# Patient Record
Sex: MALE | Race: WHITE | NOT HISPANIC OR LATINO | Employment: FULL TIME | URBAN - METROPOLITAN AREA
[De-identification: names, ages, dates, MRNs, and addresses within clinical notes are randomized per-mention and may not be internally consistent; named-entity substitution may affect disease eponyms.]

---

## 2017-05-10 DIAGNOSIS — I25.10 CAD (CORONARY ARTERY DISEASE): ICD-10-CM

## 2017-05-10 RX ORDER — ROSUVASTATIN CALCIUM 40 MG/1
40 TABLET, COATED ORAL DAILY
Qty: 90 TABLET | Refills: 0 | Status: SHIPPED | OUTPATIENT
Start: 2017-05-10 | End: 2017-12-14

## 2017-06-16 DIAGNOSIS — Z95.820 S/P ANGIOPLASTY WITH STENT: ICD-10-CM

## 2017-06-16 DIAGNOSIS — I25.10 CAD (CORONARY ARTERY DISEASE): ICD-10-CM

## 2017-06-16 RX ORDER — LISINOPRIL 10 MG/1
10 TABLET ORAL DAILY
Qty: 90 TABLET | Refills: 1 | Status: SHIPPED | OUTPATIENT
Start: 2017-06-16 | End: 2017-12-26

## 2017-06-16 NOTE — TELEPHONE ENCOUNTER
Lisinopril 10 mg tab     Take one tab by mouth daily    [facility] [address]

## 2017-10-31 DIAGNOSIS — I25.10 CAD (CORONARY ARTERY DISEASE): ICD-10-CM

## 2017-10-31 DIAGNOSIS — Z95.820 S/P ANGIOPLASTY WITH STENT: ICD-10-CM

## 2017-11-01 RX ORDER — METOPROLOL TARTRATE 25 MG/1
12.5 TABLET, FILM COATED ORAL 2 TIMES DAILY
Qty: 90 TABLET | Refills: 0 | Status: SHIPPED | OUTPATIENT
Start: 2017-11-01 | End: 2018-02-15

## 2017-12-14 DIAGNOSIS — I25.10 CAD (CORONARY ARTERY DISEASE): ICD-10-CM

## 2017-12-15 RX ORDER — ROSUVASTATIN CALCIUM 40 MG/1
40 TABLET, COATED ORAL DAILY
Qty: 90 TABLET | Refills: 0 | Status: SHIPPED | OUTPATIENT
Start: 2017-12-15 | End: 2018-02-07

## 2017-12-15 NOTE — TELEPHONE ENCOUNTER
Travon was called, and reminded that he needs to schedule a follow up appointment in the cardiology clinic to be able to get more refills.  Refill for 3 months was given, and the telephone number for the scheduling line in the cards clinic.

## 2017-12-26 DIAGNOSIS — Z95.820 S/P ANGIOPLASTY WITH STENT: ICD-10-CM

## 2017-12-26 DIAGNOSIS — I25.10 CAD (CORONARY ARTERY DISEASE): ICD-10-CM

## 2017-12-28 RX ORDER — LISINOPRIL 10 MG/1
10 TABLET ORAL DAILY
Qty: 90 TABLET | Refills: 1 | Status: SHIPPED | OUTPATIENT
Start: 2017-12-28 | End: 2018-02-15

## 2018-02-07 ENCOUNTER — MYC REFILL (OUTPATIENT)
Dept: CARDIOLOGY | Facility: CLINIC | Age: 43
End: 2018-02-07

## 2018-02-07 DIAGNOSIS — I25.10 CAD (CORONARY ARTERY DISEASE): ICD-10-CM

## 2018-02-07 RX ORDER — ROSUVASTATIN CALCIUM 40 MG/1
40 TABLET, COATED ORAL DAILY
Qty: 30 TABLET | Refills: 0 | Status: SHIPPED | OUTPATIENT
Start: 2018-02-07 | End: 2018-02-15

## 2018-02-15 ENCOUNTER — OFFICE VISIT (OUTPATIENT)
Dept: FAMILY MEDICINE | Facility: CLINIC | Age: 43
End: 2018-02-15
Payer: COMMERCIAL

## 2018-02-15 VITALS
HEIGHT: 75 IN | BODY MASS INDEX: 30.46 KG/M2 | WEIGHT: 245 LBS | DIASTOLIC BLOOD PRESSURE: 72 MMHG | SYSTOLIC BLOOD PRESSURE: 118 MMHG | HEART RATE: 68 BPM | TEMPERATURE: 98.3 F

## 2018-02-15 DIAGNOSIS — Z95.820 S/P ANGIOPLASTY WITH STENT: ICD-10-CM

## 2018-02-15 DIAGNOSIS — Z00.00 ROUTINE GENERAL MEDICAL EXAMINATION AT A HEALTH CARE FACILITY: Primary | ICD-10-CM

## 2018-02-15 DIAGNOSIS — I25.10 CORONARY ARTERY DISEASE WITHOUT ANGINA PECTORIS, UNSPECIFIED VESSEL OR LESION TYPE, UNSPECIFIED WHETHER NATIVE OR TRANSPLANTED HEART: ICD-10-CM

## 2018-02-15 PROCEDURE — 36415 COLL VENOUS BLD VENIPUNCTURE: CPT | Performed by: PHYSICIAN ASSISTANT

## 2018-02-15 PROCEDURE — 80061 LIPID PANEL: CPT | Performed by: PHYSICIAN ASSISTANT

## 2018-02-15 PROCEDURE — 99396 PREV VISIT EST AGE 40-64: CPT | Performed by: PHYSICIAN ASSISTANT

## 2018-02-15 PROCEDURE — 80048 BASIC METABOLIC PNL TOTAL CA: CPT | Performed by: PHYSICIAN ASSISTANT

## 2018-02-15 RX ORDER — NITROGLYCERIN 0.4 MG/1
0.4 TABLET SUBLINGUAL EVERY 5 MIN PRN
Qty: 25 TABLET | Refills: 3 | Status: SHIPPED | OUTPATIENT
Start: 2018-02-15 | End: 2019-05-07

## 2018-02-15 RX ORDER — ROSUVASTATIN CALCIUM 40 MG/1
40 TABLET, COATED ORAL DAILY
Qty: 90 TABLET | Refills: 3 | Status: SHIPPED | OUTPATIENT
Start: 2018-02-15 | End: 2019-04-10

## 2018-02-15 RX ORDER — METOPROLOL TARTRATE 25 MG/1
12.5 TABLET, FILM COATED ORAL 2 TIMES DAILY
Qty: 135 TABLET | Refills: 3 | Status: SHIPPED | OUTPATIENT
Start: 2018-02-15 | End: 2019-05-07

## 2018-02-15 RX ORDER — LISINOPRIL 10 MG/1
10 TABLET ORAL DAILY
Qty: 90 TABLET | Refills: 3 | Status: SHIPPED | OUTPATIENT
Start: 2018-02-15 | End: 2019-02-17

## 2018-02-15 NOTE — MR AVS SNAPSHOT
After Visit Summary   2/15/2018    Travon Livingston    MRN: [id number]           Patient Information     Date Of Birth          1975        Visit Information        Provider Department      2/15/2018 12:40 PM Huseyin Noland PA-C [facility]        Today's Diagnoses     Routine general medical examination at a health care facility    -  1    S/P angioplasty with stent        Coronary artery disease without angina pectoris, unspecified vessel or lesion type, unspecified whether native or transplanted heart           Follow-ups after your visit        Your next 10 appointments already scheduled     Feb 21, 2018  7:45 AM CST   (Arrive by 7:30 AM)   Return Visit with MIRI Reyna [facility] ([facility])    [address] [place] [address]  [address] [place] [id number] [phone]              Who to contact     If you have questions or need follow up information about today's clinic visit or your schedule please contact [facility] directly at [phone].  Normal or non-critical lab and imaging results will be communicated to you by op5hart, letter or phone within 4 business days after the clinic has received the results. If you do not hear from us within 7 days, please contact the clinic through [facility] or phone. If you have a critical or abnormal lab result, we will notify you by phone as soon as possible.  Submit refill requests through [facility] or call your pharmacy and they will forward the refill request to us. Please allow 3 business days for your refill to be completed.          Additional Information About Your Visit        op5hart Information     [facility] gives you secure access to your electronic health record. If you see a primary care provider, you can also send messages to your care team and make appointments. If you have questions, please call your primary care clinic.  If you do not have a primary care  "provider, please call [phone] and they will assist you.        Care EveryWhere ID     This is your Care EveryWhere ID. This could be used by other organizations to access your [place] medical records  [id number]        Your Vitals Were     Pulse Temperature Height BMI (Body Mass Index)          68 98.3  F (36.8  C) (Oral) 6' 3.25\" (1.911 m) 30.42 kg/m2         Blood Pressure from Last 3 Encounters:   02/15/18 118/72   07/20/15 116/68   02/13/15 108/71    Weight from Last 3 Encounters:   02/15/18 245 lb (111.1 kg)   07/20/15 261 lb 6 oz (118.6 kg)   02/13/15 258 lb 4.8 oz (117.2 kg)              We Performed the Following     Basic metabolic panel  (Ca, Cl, CO2, Creat, Gluc, K, Na, BUN)     Lipid panel reflex to direct LDL Fasting          Today's Medication Changes          These changes are accurate as of 2/15/18  1:09 PM.  If you have any questions, ask your nurse or doctor.               These medicines have changed or have updated prescriptions.        Dose/Directions    lisinopril 10 MG tablet   Commonly known as:  PRINIVIL/ZESTRIL   This may have changed:  additional instructions   Used for:  Coronary artery disease without angina pectoris, unspecified vessel or lesion type, unspecified whether native or transplanted heart, S/P angioplasty with stent   Changed by:  Huseyin Noland PA-C        Dose:  10 mg   Take 1 tablet (10 mg) by mouth daily   Quantity:  90 tablet   Refills:  3       metoprolol tartrate 25 MG tablet   Commonly known as:  LOPRESSOR   This may have changed:  additional instructions   Used for:  S/P angioplasty with stent, Coronary artery disease without angina pectoris, unspecified vessel or lesion type, unspecified whether native or transplanted heart   Changed by:  Huseyin Noland PA-C        Dose:  12.5 mg   Take 0.5 tablets (12.5 mg) by mouth 2 times daily   Quantity:  135 tablet   Refills:  3            Where to get your medicines      These medications were sent to [facility] [id number] [facility] [place] - [address]  [address], [place] [address]     Phone:  [phone]     lisinopril 10 MG tablet    metoprolol tartrate 25 MG tablet    nitroGLYcerin 0.4 MG sublingual tablet    rosuvastatin 40 MG tablet                Primary Care Provider Office Phone # Fax #    Huseyin Diallo Noland PA-C [phone]       [address] [place] [address]        Equal Access to Services     [facility]: Hadii aad ku hadasho Soomaali, waaxda luqadaha, qaybta kaalmada adeegyada, waxay idiin hayaan adeeg kharash beatriz . So [facility] [phone].    ATENCIÓN: Si habla español, tiene a krishnamurthy disposición servicios gratuitos de asistencia lingüística. [facility] [phone].    We comply with applicable federal civil rights laws and [place] laws. We do not discriminate on the basis of race, color, national origin, age, disability, sex, sexual orientation, or gender identity.            Thank you!     Thank you for choosing [facility]  for your care. Our goal is always to provide you with excellent care. Hearing back from our patients is one way we can continue to improve our services. Please take a few minutes to complete the written survey that you may receive in the mail after your visit with us. Thank you!             Your Updated Medication List - Protect others around you: Learn how to safely use, store and throw away your medicines at www.disposemymeds.org.          This list is accurate as of 2/15/18  1:09 PM.  Always use your most recent med list.                   Brand Name Dispense Instructions for use Diagnosis    aspirin 81 MG EC tablet      Take 1 tablet by mouth daily.    CAD (coronary artery disease)       lisinopril 10 MG tablet    PRINIVIL/ZESTRIL    90 tablet    Take 1 tablet (10 mg) by mouth daily    Coronary artery disease without angina pectoris, unspecified vessel or lesion type, unspecified whether native or transplanted heart, S/P  angioplasty with stent       metoprolol tartrate 25 MG tablet    LOPRESSOR    135 tablet    Take 0.5 tablets (12.5 mg) by mouth 2 times daily    S/P angioplasty with stent, Coronary artery disease without angina pectoris, unspecified vessel or lesion type, unspecified whether native or transplanted heart       nitroGLYcerin 0.4 MG sublingual tablet    NITROSTAT    25 tablet    Place 1 tablet (0.4 mg) under the tongue every 5 minutes as needed for chest pain Can repeat x3, then call EMS    Coronary artery disease without angina pectoris, unspecified vessel or lesion type, unspecified whether native or transplanted heart, S/P angioplasty with stent       rosuvastatin 40 MG tablet    CRESTOR    90 tablet    Take 1 tablet (40 mg) by mouth daily    S/P angioplasty with stent, Coronary artery disease without angina pectoris, unspecified vessel or lesion type, unspecified whether native or transplanted heart

## 2018-02-15 NOTE — PROGRESS NOTES
SUBJECTIVE:   CC: Travon Livingston is an 42 year old male who presents for preventative health visit.     Physical   Annual:     Getting at least 3 servings of Calcium per day::  Yes    Bi-annual eye exam::  Yes    Dental care twice a year::  NO    Sleep apnea or symptoms of sleep apnea::  Excessive snoring    Diet::  Low fat/cholesterol    Frequency of exercise::  2-3 days/week    Duration of exercise::  30-45 minutes    Taking medications regularly::  No    Barriers to taking medications::  Problems remembering to take them    Additional concerns today::  No                Today's PHQ-2 Score:   PHQ-2 ( 1999 [employer]) 2/14/2018   Q1: Little interest or pleasure in doing things 0   Q2: Feeling down, depressed or hopeless 1   PHQ-2 Score 1   Q1: Little interest or pleasure in doing things Not at all   Q2: Feeling down, depressed or hopeless Several days   PHQ-2 Score 1       Abuse: Current or Past(Physical, Sexual or Emotional)- No  Do you feel safe in your environment - Yes    Social History   Substance Use Topics     Smoking status: Former Smoker     Packs/day: 1.00     Years: 15.00     Types: Cigarettes     Quit date: 8/23/2011     Smokeless tobacco: Never Used     Alcohol use Yes      Comment: daily, 1 to 2 beers      Alcohol Use 2/14/2018   If you drink alcohol, do you typically have greater than 3 drinks per day OR greater than 7 drinks per week?   No   No flowsheet data found.    Last PSA: No results found for: PSA    Reviewed orders with patient. Reviewed health maintenance and updated orders accordingly - Yes  Labs reviewed in EPIC    Reviewed and updated as needed this visit by clinical staff         Reviewed and updated as needed this visit by Provider            Review of Systems  C: NEGATIVE for fever, chills, change in weight  I: NEGATIVE for worrisome rashes, moles or lesions  E: NEGATIVE for vision changes or irritation  ENT: NEGATIVE for ear, mouth and throat problems  R: NEGATIVE for significant cough  or SOB  CV: NEGATIVE for chest pain, palpitations or peripheral edema  GI: NEGATIVE for nausea, abdominal pain, heartburn, or change in bowel habits   male: negative for dysuria, hematuria, decreased urinary stream, erectile dysfunction, urethral discharge  M: NEGATIVE for significant arthralgias or myalgia  N: NEGATIVE for weakness, dizziness or paresthesias  P: NEGATIVE for changes in mood or affect    OBJECTIVE:   There were no vitals taken for this visit.    Physical Exam  GENERAL: healthy, alert and no distress  EYES: Eyes grossly normal to inspection, PERRL and conjunctivae and sclerae normal  HENT: ear canals and TM's normal, nose and mouth without ulcers or lesions  NECK: no adenopathy, no asymmetry, masses, or scars and thyroid normal to palpation  RESP: lungs clear to auscultation - no rales, rhonchi or wheezes  CV: regular rate and rhythm, normal S1 S2, no S3 or S4, no murmur, click or rub, no peripheral edema and peripheral pulses strong  ABDOMEN: soft, nontender, no hepatosplenomegaly, no masses and bowel sounds normal  MS: no gross musculoskeletal defects noted, no edema  SKIN: no suspicious lesions or rashes  NEURO: Normal strength and tone, mentation intact and speech normal  PSYCH: mentation appears normal, affect normal/bright  LYMPH: no cervical, supraclavicular, axillary, or inguinal adenopathy    ASSESSMENT/PLAN:   (Z00.00) Routine general medical examination at a health care facility  (primary encounter diagnosis)  Comment: Well person   Plan: Basic metabolic panel  (Ca, Cl, CO2, Creat,         Gluc, K, Na, BUN), Lipid panel reflex to direct        LDL Fasting        Diet, exercise, wellness and other preventive recommendations related to health maintenance were discussed.  Follow up as needed for acute issues.  Physical exam in 1 year.     (Z95.9) S/P angioplasty with stent  Comment:   Plan: rosuvastatin (CRESTOR) 40 MG tablet, metoprolol        tartrate (LOPRESSOR) 25 MG tablet, lisinopril  "        (PRINIVIL/ZESTRIL) 10 MG tablet, nitroGLYcerin         (NITROSTAT) 0.4 MG sublingual tablet        Asymptomatic. No concerns. Continue meds.    (I25.10) Coronary artery disease without angina pectoris, unspecified vessel or lesion type, unspecified whether native or transplanted heart  Comment:   Plan: rosuvastatin (CRESTOR) 40 MG tablet, metoprolol        tartrate (LOPRESSOR) 25 MG tablet, lisinopril         (PRINIVIL/ZESTRIL) 10 MG tablet, nitroGLYcerin         (NITROSTAT) 0.4 MG sublingual tablet        Asymptomatic. No concerns. Continue meds, exercise, general diet/lifestyle modifications      COUNSELING:   Reviewed preventive health counseling, as reflected in patient instructions         reports that he quit smoking about 6 years ago. His smoking use included Cigarettes. He has a 15.00 pack-year smoking history. He has never used smokeless tobacco.    Estimated body mass index is 32.24 kg/(m^2) as calculated from the following:    Height as of 7/20/15: 6' 3.5\" (1.918 m).    Weight as of 7/20/15: 261 lb 6 oz (118.6 kg).       Counseling Resources:  ATP IV Guidelines  Pooled Cohorts Equation Calculator  FRAX Risk Assessment  ICSI Preventive Guidelines  Dietary Guidelines for Americans, 2010  USDA's MyPlate  ASA Prophylaxis  Lung CA Screening    ONOFRE MORALES PA-C  [facility]  Answers for HPI/ROS submitted by the patient on 2/14/2018   PHQ-2 Score: 1    "

## 2018-02-16 LAB
ANION GAP SERPL CALCULATED.3IONS-SCNC: 9 MMOL/L (ref 3–14)
BUN SERPL-MCNC: 12 MG/DL (ref 7–30)
CALCIUM SERPL-MCNC: 9.4 MG/DL (ref 8.5–10.1)
CHLORIDE SERPL-SCNC: 105 MMOL/L (ref 94–109)
CHOLEST SERPL-MCNC: 211 MG/DL
CO2 SERPL-SCNC: 24 MMOL/L (ref 20–32)
CREAT SERPL-MCNC: 0.85 MG/DL (ref 0.66–1.25)
GFR SERPL CREATININE-BSD FRML MDRD: >90 ML/MIN/1.7M2
GLUCOSE SERPL-MCNC: 86 MG/DL (ref 70–99)
HDLC SERPL-MCNC: 51 MG/DL
LDLC SERPL CALC-MCNC: 134 MG/DL
NONHDLC SERPL-MCNC: 160 MG/DL
POTASSIUM SERPL-SCNC: 4.4 MMOL/L (ref 3.4–5.3)
SODIUM SERPL-SCNC: 138 MMOL/L (ref 133–144)
TRIGL SERPL-MCNC: 130 MG/DL

## 2018-07-16 ENCOUNTER — APPOINTMENT (OUTPATIENT)
Dept: CARDIOLOGY | Facility: CLINIC | Age: 43
End: 2018-07-16
Attending: PHYSICIAN ASSISTANT
Payer: COMMERCIAL

## 2018-07-16 ENCOUNTER — APPOINTMENT (OUTPATIENT)
Dept: GENERAL RADIOLOGY | Facility: CLINIC | Age: 43
End: 2018-07-16
Attending: EMERGENCY MEDICINE
Payer: COMMERCIAL

## 2018-07-16 ENCOUNTER — HOSPITAL ENCOUNTER (OUTPATIENT)
Facility: CLINIC | Age: 43
Setting detail: OBSERVATION
Discharge: HOME | End: 2018-07-17
Attending: EMERGENCY MEDICINE | Admitting: INTERNAL MEDICINE
Payer: COMMERCIAL

## 2018-07-16 DIAGNOSIS — I25.10 CAD S/P PERCUTANEOUS CORONARY ANGIOPLASTY: ICD-10-CM

## 2018-07-16 DIAGNOSIS — R07.9 CHEST PAIN: ICD-10-CM

## 2018-07-16 DIAGNOSIS — Z95.820 S/P ANGIOPLASTY WITH STENT: Primary | ICD-10-CM

## 2018-07-16 DIAGNOSIS — G47.33 OSA (OBSTRUCTIVE SLEEP APNEA): ICD-10-CM

## 2018-07-16 DIAGNOSIS — Z98.61 CAD S/P PERCUTANEOUS CORONARY ANGIOPLASTY: ICD-10-CM

## 2018-07-16 LAB
ALBUMIN SERPL-MCNC: 4.7 G/DL (ref 3.4–5)
ALP SERPL-CCNC: 86 U/L (ref 40–150)
ALT SERPL W P-5'-P-CCNC: 36 U/L (ref 0–70)
ANION GAP SERPL CALCULATED.3IONS-SCNC: 7 MMOL/L (ref 3–14)
AST SERPL W P-5'-P-CCNC: 19 U/L (ref 0–45)
BASOPHILS # BLD AUTO: 0 10E9/L (ref 0–0.2)
BASOPHILS NFR BLD AUTO: 0.6 %
BILIRUB SERPL-MCNC: 0.6 MG/DL (ref 0.2–1.3)
BUN SERPL-MCNC: 10 MG/DL (ref 7–30)
CALCIUM SERPL-MCNC: 8.9 MG/DL (ref 8.5–10.1)
CHLORIDE SERPL-SCNC: 105 MMOL/L (ref 94–109)
CO2 SERPL-SCNC: 26 MMOL/L (ref 20–32)
CREAT SERPL-MCNC: 0.87 MG/DL (ref 0.66–1.25)
DIFFERENTIAL METHOD BLD: NORMAL
EOSINOPHIL # BLD AUTO: 0.1 10E9/L (ref 0–0.7)
EOSINOPHIL NFR BLD AUTO: 1.6 %
ERYTHROCYTE [DISTWIDTH] IN BLOOD BY AUTOMATED COUNT: 12.3 % (ref 10–15)
GFR SERPL CREATININE-BSD FRML MDRD: >90 ML/MIN/1.7M2
GLUCOSE SERPL-MCNC: 96 MG/DL (ref 70–99)
HCT VFR BLD AUTO: 46.6 % (ref 40–53)
HGB BLD-MCNC: 16.2 G/DL (ref 13.3–17.7)
IMM GRANULOCYTES # BLD: 0 10E9/L (ref 0–0.4)
IMM GRANULOCYTES NFR BLD: 0.1 %
INTERPRETATION ECG - MUSE: NORMAL
KCT BLD-ACNC: 205 SEC (ref 75–150)
LYMPHOCYTES # BLD AUTO: 2 10E9/L (ref 0.8–5.3)
LYMPHOCYTES NFR BLD AUTO: 29 %
MCH RBC QN AUTO: 31.8 PG (ref 26.5–33)
MCHC RBC AUTO-ENTMCNC: 34.8 G/DL (ref 31.5–36.5)
MCV RBC AUTO: 92 FL (ref 78–100)
MONOCYTES # BLD AUTO: 0.5 10E9/L (ref 0–1.3)
MONOCYTES NFR BLD AUTO: 6.6 %
NEUTROPHILS # BLD AUTO: 4.3 10E9/L (ref 1.6–8.3)
NEUTROPHILS NFR BLD AUTO: 62.1 %
NRBC # BLD AUTO: 0 10*3/UL
NRBC BLD AUTO-RTO: 0 /100
PLATELET # BLD AUTO: 261 10E9/L (ref 150–450)
POTASSIUM SERPL-SCNC: 4.1 MMOL/L (ref 3.4–5.3)
PROT SERPL-MCNC: 8 G/DL (ref 6.8–8.8)
RBC # BLD AUTO: 5.09 10E12/L (ref 4.4–5.9)
SODIUM SERPL-SCNC: 138 MMOL/L (ref 133–144)
TROPONIN I BLD-MCNC: 0 UG/L (ref 0–0.1)
TROPONIN I SERPL-MCNC: <0.015 UG/L (ref 0–0.04)
TROPONIN I SERPL-MCNC: <0.015 UG/L (ref 0–0.04)
WBC # BLD AUTO: 7 10E9/L (ref 4–11)

## 2018-07-16 PROCEDURE — 80061 LIPID PANEL: CPT | Performed by: PHYSICIAN ASSISTANT

## 2018-07-16 PROCEDURE — 25000132 ZZH RX MED GY IP 250 OP 250 PS 637: Performed by: INTERNAL MEDICINE

## 2018-07-16 PROCEDURE — C1894 INTRO/SHEATH, NON-LASER: HCPCS

## 2018-07-16 PROCEDURE — 27210742 ZZH CATH CR1

## 2018-07-16 PROCEDURE — 93454 CORONARY ARTERY ANGIO S&I: CPT

## 2018-07-16 PROCEDURE — C1760 CLOSURE DEV, VASC: HCPCS

## 2018-07-16 PROCEDURE — 85025 COMPLETE CBC W/AUTO DIFF WBC: CPT | Performed by: EMERGENCY MEDICINE

## 2018-07-16 PROCEDURE — C1874 STENT, COATED/COV W/DEL SYS: HCPCS

## 2018-07-16 PROCEDURE — C1887 CATHETER, GUIDING: HCPCS

## 2018-07-16 PROCEDURE — 71045 X-RAY EXAM CHEST 1 VIEW: CPT

## 2018-07-16 PROCEDURE — 93005 ELECTROCARDIOGRAM TRACING: CPT | Mod: 59 | Performed by: EMERGENCY MEDICINE

## 2018-07-16 PROCEDURE — 99284 EMERGENCY DEPT VISIT MOD MDM: CPT | Mod: 25 | Performed by: EMERGENCY MEDICINE

## 2018-07-16 PROCEDURE — C1725 CATH, TRANSLUMIN NON-LASER: HCPCS

## 2018-07-16 PROCEDURE — C9600 PERC DRUG-EL COR STENT SING: HCPCS

## 2018-07-16 PROCEDURE — 99152 MOD SED SAME PHYS/QHP 5/>YRS: CPT | Performed by: INTERNAL MEDICINE

## 2018-07-16 PROCEDURE — 27210787 ZZH MANIFOLD CR2

## 2018-07-16 PROCEDURE — 25000128 H RX IP 250 OP 636: Performed by: EMERGENCY MEDICINE

## 2018-07-16 PROCEDURE — 25000132 ZZH RX MED GY IP 250 OP 250 PS 637: Performed by: EMERGENCY MEDICINE

## 2018-07-16 PROCEDURE — 96374 THER/PROPH/DIAG INJ IV PUSH: CPT | Performed by: EMERGENCY MEDICINE

## 2018-07-16 PROCEDURE — 80053 COMPREHEN METABOLIC PANEL: CPT | Performed by: EMERGENCY MEDICINE

## 2018-07-16 PROCEDURE — 99204 OFFICE O/P NEW MOD 45 MIN: CPT | Mod: 25 | Performed by: PHYSICIAN ASSISTANT

## 2018-07-16 PROCEDURE — 25000128 H RX IP 250 OP 636: Performed by: STUDENT IN AN ORGANIZED HEALTH CARE EDUCATION/TRAINING PROGRAM

## 2018-07-16 PROCEDURE — 85347 COAGULATION TIME ACTIVATED: CPT

## 2018-07-16 PROCEDURE — 92928 PRQ TCAT PLMT NTRAC ST 1 LES: CPT | Mod: RC | Performed by: INTERNAL MEDICINE

## 2018-07-16 PROCEDURE — 93005 ELECTROCARDIOGRAM TRACING: CPT

## 2018-07-16 PROCEDURE — 25000125 ZZHC RX 250: Performed by: HOSPITALIST

## 2018-07-16 PROCEDURE — G0378 HOSPITAL OBSERVATION PER HR: HCPCS

## 2018-07-16 PROCEDURE — 27211089 ZZH KIT ACIST INJECTOR CR3

## 2018-07-16 PROCEDURE — 40000065 ZZH STATISTIC EKG NON-CHARGEABLE

## 2018-07-16 PROCEDURE — 93010 ELECTROCARDIOGRAM REPORT: CPT | Performed by: INTERNAL MEDICINE

## 2018-07-16 PROCEDURE — 36415 COLL VENOUS BLD VENIPUNCTURE: CPT | Performed by: PHYSICIAN ASSISTANT

## 2018-07-16 PROCEDURE — 93010 ELECTROCARDIOGRAM REPORT: CPT | Mod: Z6 | Performed by: EMERGENCY MEDICINE

## 2018-07-16 PROCEDURE — 99219 ZZC INITIAL OBSERVATION CARE,LEVL II: CPT | Mod: 25 | Performed by: INTERNAL MEDICINE

## 2018-07-16 PROCEDURE — 99153 MOD SED SAME PHYS/QHP EA: CPT

## 2018-07-16 PROCEDURE — 27210998 ZZH ACCESS HEART CATH CR6

## 2018-07-16 PROCEDURE — C1769 GUIDE WIRE: HCPCS

## 2018-07-16 PROCEDURE — 84484 ASSAY OF TROPONIN QUANT: CPT | Mod: 91

## 2018-07-16 PROCEDURE — 99285 EMERGENCY DEPT VISIT HI MDM: CPT | Mod: 25 | Performed by: EMERGENCY MEDICINE

## 2018-07-16 PROCEDURE — 99152 MOD SED SAME PHYS/QHP 5/>YRS: CPT

## 2018-07-16 PROCEDURE — 84484 ASSAY OF TROPONIN QUANT: CPT | Mod: 91 | Performed by: PHYSICIAN ASSISTANT

## 2018-07-16 PROCEDURE — 93454 CORONARY ARTERY ANGIO S&I: CPT | Mod: 26 | Performed by: INTERNAL MEDICINE

## 2018-07-16 PROCEDURE — 84484 ASSAY OF TROPONIN QUANT: CPT | Performed by: EMERGENCY MEDICINE

## 2018-07-16 PROCEDURE — 27210760 ZZH DEVICE INFLATION CR7

## 2018-07-16 PROCEDURE — 27210946 ZZH KIT HC TOTES DISP CR8

## 2018-07-16 PROCEDURE — 25000128 H RX IP 250 OP 636: Performed by: HOSPITALIST

## 2018-07-16 RX ORDER — ONDANSETRON 2 MG/ML
4 INJECTION INTRAMUSCULAR; INTRAVENOUS EVERY 6 HOURS PRN
Status: DISCONTINUED | OUTPATIENT
Start: 2018-07-16 | End: 2018-07-16

## 2018-07-16 RX ORDER — ARGATROBAN 1 MG/ML
350 INJECTION, SOLUTION INTRAVENOUS
Status: DISCONTINUED | OUTPATIENT
Start: 2018-07-16 | End: 2018-07-16 | Stop reason: HOSPADM

## 2018-07-16 RX ORDER — FLUMAZENIL 0.1 MG/ML
0.2 INJECTION, SOLUTION INTRAVENOUS
Status: ACTIVE | OUTPATIENT
Start: 2018-07-16 | End: 2018-07-17

## 2018-07-16 RX ORDER — ONDANSETRON 2 MG/ML
4 INJECTION INTRAMUSCULAR; INTRAVENOUS EVERY 6 HOURS PRN
Status: DISCONTINUED | OUTPATIENT
Start: 2018-07-16 | End: 2018-07-17 | Stop reason: HOSPADM

## 2018-07-16 RX ORDER — PROTAMINE SULFATE 10 MG/ML
25-100 INJECTION, SOLUTION INTRAVENOUS EVERY 5 MIN PRN
Status: DISCONTINUED | OUTPATIENT
Start: 2018-07-16 | End: 2018-07-16 | Stop reason: HOSPADM

## 2018-07-16 RX ORDER — DEXTROSE MONOHYDRATE 25 G/50ML
12.5-5 INJECTION, SOLUTION INTRAVENOUS EVERY 30 MIN PRN
Status: DISCONTINUED | OUTPATIENT
Start: 2018-07-16 | End: 2018-07-16 | Stop reason: HOSPADM

## 2018-07-16 RX ORDER — NITROGLYCERIN 0.4 MG/1
0.4 TABLET SUBLINGUAL EVERY 5 MIN PRN
Status: DISCONTINUED | OUTPATIENT
Start: 2018-07-16 | End: 2018-07-16

## 2018-07-16 RX ORDER — ASPIRIN 81 MG/1
324 TABLET, CHEWABLE ORAL ONCE
Status: COMPLETED | OUTPATIENT
Start: 2018-07-16 | End: 2018-07-16

## 2018-07-16 RX ORDER — ROSUVASTATIN CALCIUM 40 MG/1
40 TABLET, COATED ORAL DAILY
Status: DISCONTINUED | OUTPATIENT
Start: 2018-07-17 | End: 2018-07-17 | Stop reason: HOSPADM

## 2018-07-16 RX ORDER — NITROGLYCERIN 5 MG/ML
100-200 VIAL (ML) INTRAVENOUS
Status: DISCONTINUED | OUTPATIENT
Start: 2018-07-16 | End: 2018-07-16 | Stop reason: HOSPADM

## 2018-07-16 RX ORDER — ATROPINE SULFATE 0.1 MG/ML
0.5 INJECTION INTRAVENOUS EVERY 5 MIN PRN
Status: ACTIVE | OUTPATIENT
Start: 2018-07-16 | End: 2018-07-17

## 2018-07-16 RX ORDER — DIPHENHYDRAMINE HYDROCHLORIDE 50 MG/ML
25-50 INJECTION INTRAMUSCULAR; INTRAVENOUS
Status: DISCONTINUED | OUTPATIENT
Start: 2018-07-16 | End: 2018-07-16 | Stop reason: HOSPADM

## 2018-07-16 RX ORDER — IOPAMIDOL 755 MG/ML
60 INJECTION, SOLUTION INTRAVASCULAR ONCE
Status: COMPLETED | OUTPATIENT
Start: 2018-07-16 | End: 2018-07-16

## 2018-07-16 RX ORDER — CLOPIDOGREL BISULFATE 75 MG/1
300-600 TABLET ORAL
Status: DISCONTINUED | OUTPATIENT
Start: 2018-07-16 | End: 2018-07-16 | Stop reason: HOSPADM

## 2018-07-16 RX ORDER — POTASSIUM CHLORIDE 7.45 MG/ML
10 INJECTION INTRAVENOUS
Status: DISCONTINUED | OUTPATIENT
Start: 2018-07-16 | End: 2018-07-16 | Stop reason: HOSPADM

## 2018-07-16 RX ORDER — NICARDIPINE HYDROCHLORIDE 2.5 MG/ML
100 INJECTION INTRAVENOUS
Status: DISCONTINUED | OUTPATIENT
Start: 2018-07-16 | End: 2018-07-16 | Stop reason: HOSPADM

## 2018-07-16 RX ORDER — ALUMINA, MAGNESIA, AND SIMETHICONE 2400; 2400; 240 MG/30ML; MG/30ML; MG/30ML
30 SUSPENSION ORAL EVERY 4 HOURS PRN
Status: DISCONTINUED | OUTPATIENT
Start: 2018-07-16 | End: 2018-07-17 | Stop reason: HOSPADM

## 2018-07-16 RX ORDER — ACETAMINOPHEN 325 MG/1
650 TABLET ORAL EVERY 4 HOURS PRN
Status: DISCONTINUED | OUTPATIENT
Start: 2018-07-16 | End: 2018-07-17 | Stop reason: HOSPADM

## 2018-07-16 RX ORDER — HYDRALAZINE HYDROCHLORIDE 20 MG/ML
10 INJECTION INTRAMUSCULAR; INTRAVENOUS
Status: DISCONTINUED | OUTPATIENT
Start: 2018-07-16 | End: 2018-07-17 | Stop reason: HOSPADM

## 2018-07-16 RX ORDER — NALOXONE HYDROCHLORIDE 0.4 MG/ML
.2-.4 INJECTION, SOLUTION INTRAMUSCULAR; INTRAVENOUS; SUBCUTANEOUS
Status: ACTIVE | OUTPATIENT
Start: 2018-07-16 | End: 2018-07-17

## 2018-07-16 RX ORDER — NALOXONE HYDROCHLORIDE 0.4 MG/ML
.1-.4 INJECTION, SOLUTION INTRAMUSCULAR; INTRAVENOUS; SUBCUTANEOUS
Status: DISCONTINUED | OUTPATIENT
Start: 2018-07-16 | End: 2018-07-17 | Stop reason: HOSPADM

## 2018-07-16 RX ORDER — NALOXONE HYDROCHLORIDE 0.4 MG/ML
0.4 INJECTION, SOLUTION INTRAMUSCULAR; INTRAVENOUS; SUBCUTANEOUS EVERY 5 MIN PRN
Status: DISCONTINUED | OUTPATIENT
Start: 2018-07-16 | End: 2018-07-16 | Stop reason: HOSPADM

## 2018-07-16 RX ORDER — SODIUM CHLORIDE 9 MG/ML
INJECTION, SOLUTION INTRAVENOUS CONTINUOUS
Status: ACTIVE | OUTPATIENT
Start: 2018-07-16 | End: 2018-07-16

## 2018-07-16 RX ORDER — METOPROLOL TARTRATE 25 MG/1
25 TABLET, FILM COATED ORAL 2 TIMES DAILY
Status: DISCONTINUED | OUTPATIENT
Start: 2018-07-16 | End: 2018-07-16

## 2018-07-16 RX ORDER — PROTAMINE SULFATE 10 MG/ML
1-5 INJECTION, SOLUTION INTRAVENOUS
Status: DISCONTINUED | OUTPATIENT
Start: 2018-07-16 | End: 2018-07-16 | Stop reason: HOSPADM

## 2018-07-16 RX ORDER — ONDANSETRON 2 MG/ML
4 INJECTION INTRAMUSCULAR; INTRAVENOUS EVERY 4 HOURS PRN
Status: DISCONTINUED | OUTPATIENT
Start: 2018-07-16 | End: 2018-07-16 | Stop reason: HOSPADM

## 2018-07-16 RX ORDER — FUROSEMIDE 10 MG/ML
20-100 INJECTION INTRAMUSCULAR; INTRAVENOUS
Status: DISCONTINUED | OUTPATIENT
Start: 2018-07-16 | End: 2018-07-16 | Stop reason: HOSPADM

## 2018-07-16 RX ORDER — ARGATROBAN 1 MG/ML
150 INJECTION, SOLUTION INTRAVENOUS
Status: DISCONTINUED | OUTPATIENT
Start: 2018-07-16 | End: 2018-07-16 | Stop reason: HOSPADM

## 2018-07-16 RX ORDER — LORAZEPAM 2 MG/ML
.5-2 INJECTION INTRAMUSCULAR EVERY 4 HOURS PRN
Status: DISCONTINUED | OUTPATIENT
Start: 2018-07-16 | End: 2018-07-16 | Stop reason: HOSPADM

## 2018-07-16 RX ORDER — ONDANSETRON 4 MG/1
4 TABLET, ORALLY DISINTEGRATING ORAL EVERY 6 HOURS PRN
Status: DISCONTINUED | OUTPATIENT
Start: 2018-07-16 | End: 2018-07-17 | Stop reason: HOSPADM

## 2018-07-16 RX ORDER — PRASUGREL 10 MG/1
10-60 TABLET, FILM COATED ORAL
Status: DISCONTINUED | OUTPATIENT
Start: 2018-07-16 | End: 2018-07-16 | Stop reason: HOSPADM

## 2018-07-16 RX ORDER — FLUMAZENIL 0.1 MG/ML
0.2 INJECTION, SOLUTION INTRAVENOUS
Status: DISCONTINUED | OUTPATIENT
Start: 2018-07-16 | End: 2018-07-16 | Stop reason: HOSPADM

## 2018-07-16 RX ORDER — ASPIRIN 81 MG/1
81-324 TABLET, CHEWABLE ORAL
Status: DISCONTINUED | OUTPATIENT
Start: 2018-07-16 | End: 2018-07-16 | Stop reason: HOSPADM

## 2018-07-16 RX ORDER — ONDANSETRON 2 MG/ML
4 INJECTION INTRAMUSCULAR; INTRAVENOUS EVERY 30 MIN PRN
Status: DISCONTINUED | OUTPATIENT
Start: 2018-07-16 | End: 2018-07-16

## 2018-07-16 RX ORDER — NITROGLYCERIN 0.4 MG/1
0.4 TABLET SUBLINGUAL EVERY 5 MIN PRN
Status: DISCONTINUED | OUTPATIENT
Start: 2018-07-16 | End: 2018-07-16 | Stop reason: HOSPADM

## 2018-07-16 RX ORDER — LIDOCAINE HYDROCHLORIDE 10 MG/ML
30 INJECTION, SOLUTION EPIDURAL; INFILTRATION; INTRACAUDAL; PERINEURAL
Status: DISCONTINUED | OUTPATIENT
Start: 2018-07-16 | End: 2018-07-16 | Stop reason: HOSPADM

## 2018-07-16 RX ORDER — HYDRALAZINE HYDROCHLORIDE 20 MG/ML
10-20 INJECTION INTRAMUSCULAR; INTRAVENOUS
Status: DISCONTINUED | OUTPATIENT
Start: 2018-07-16 | End: 2018-07-16 | Stop reason: HOSPADM

## 2018-07-16 RX ORDER — ACETAMINOPHEN 650 MG/1
650 SUPPOSITORY RECTAL EVERY 4 HOURS PRN
Status: DISCONTINUED | OUTPATIENT
Start: 2018-07-16 | End: 2018-07-17 | Stop reason: HOSPADM

## 2018-07-16 RX ORDER — METHYLPREDNISOLONE SODIUM SUCCINATE 125 MG/2ML
125 INJECTION, POWDER, LYOPHILIZED, FOR SOLUTION INTRAMUSCULAR; INTRAVENOUS
Status: DISCONTINUED | OUTPATIENT
Start: 2018-07-16 | End: 2018-07-16 | Stop reason: HOSPADM

## 2018-07-16 RX ORDER — NITROGLYCERIN 5 MG/ML
100-500 VIAL (ML) INTRAVENOUS
Status: DISCONTINUED | OUTPATIENT
Start: 2018-07-16 | End: 2018-07-16 | Stop reason: HOSPADM

## 2018-07-16 RX ORDER — POTASSIUM CHLORIDE 29.8 MG/ML
20 INJECTION INTRAVENOUS
Status: DISCONTINUED | OUTPATIENT
Start: 2018-07-16 | End: 2018-07-16 | Stop reason: HOSPADM

## 2018-07-16 RX ORDER — HEPARIN SODIUM 1000 [USP'U]/ML
1000-10000 INJECTION, SOLUTION INTRAVENOUS; SUBCUTANEOUS EVERY 5 MIN PRN
Status: DISCONTINUED | OUTPATIENT
Start: 2018-07-16 | End: 2018-07-16 | Stop reason: HOSPADM

## 2018-07-16 RX ORDER — CLOPIDOGREL BISULFATE 75 MG/1
75 TABLET ORAL
Status: DISCONTINUED | OUTPATIENT
Start: 2018-07-16 | End: 2018-07-16 | Stop reason: HOSPADM

## 2018-07-16 RX ORDER — NITROGLYCERIN 20 MG/100ML
.07-1.74 INJECTION INTRAVENOUS CONTINUOUS PRN
Status: DISCONTINUED | OUTPATIENT
Start: 2018-07-16 | End: 2018-07-16 | Stop reason: HOSPADM

## 2018-07-16 RX ORDER — ONDANSETRON 4 MG/1
4 TABLET, ORALLY DISINTEGRATING ORAL EVERY 6 HOURS PRN
Status: DISCONTINUED | OUTPATIENT
Start: 2018-07-16 | End: 2018-07-16

## 2018-07-16 RX ORDER — EPTIFIBATIDE 2 MG/ML
180 INJECTION, SOLUTION INTRAVENOUS EVERY 10 MIN PRN
Status: DISCONTINUED | OUTPATIENT
Start: 2018-07-16 | End: 2018-07-16 | Stop reason: HOSPADM

## 2018-07-16 RX ORDER — ACETAMINOPHEN 325 MG/1
650 TABLET ORAL EVERY 4 HOURS PRN
Status: DISCONTINUED | OUTPATIENT
Start: 2018-07-16 | End: 2018-07-16

## 2018-07-16 RX ORDER — LISINOPRIL 10 MG/1
10 TABLET ORAL DAILY
Status: DISCONTINUED | OUTPATIENT
Start: 2018-07-17 | End: 2018-07-17 | Stop reason: HOSPADM

## 2018-07-16 RX ORDER — PHENYLEPHRINE HCL IN 0.9% NACL 1 MG/10 ML
20-100 SYRINGE (ML) INTRAVENOUS
Status: DISCONTINUED | OUTPATIENT
Start: 2018-07-16 | End: 2018-07-16 | Stop reason: HOSPADM

## 2018-07-16 RX ORDER — ASPIRIN 81 MG/1
81 TABLET ORAL DAILY
Status: DISCONTINUED | OUTPATIENT
Start: 2018-07-17 | End: 2018-07-17 | Stop reason: HOSPADM

## 2018-07-16 RX ORDER — ADENOSINE 3 MG/ML
12-12000 INJECTION, SOLUTION INTRAVENOUS
Status: DISCONTINUED | OUTPATIENT
Start: 2018-07-16 | End: 2018-07-16 | Stop reason: HOSPADM

## 2018-07-16 RX ORDER — ASPIRIN 325 MG
325 TABLET ORAL
Status: DISCONTINUED | OUTPATIENT
Start: 2018-07-16 | End: 2018-07-16 | Stop reason: HOSPADM

## 2018-07-16 RX ORDER — VERAPAMIL HYDROCHLORIDE 2.5 MG/ML
1-5 INJECTION, SOLUTION INTRAVENOUS
Status: DISCONTINUED | OUTPATIENT
Start: 2018-07-16 | End: 2018-07-16 | Stop reason: HOSPADM

## 2018-07-16 RX ORDER — NIFEDIPINE 10 MG/1
10 CAPSULE ORAL
Status: DISCONTINUED | OUTPATIENT
Start: 2018-07-16 | End: 2018-07-16 | Stop reason: HOSPADM

## 2018-07-16 RX ORDER — FENTANYL CITRATE 50 UG/ML
25-50 INJECTION, SOLUTION INTRAMUSCULAR; INTRAVENOUS
Status: DISCONTINUED | OUTPATIENT
Start: 2018-07-16 | End: 2018-07-16 | Stop reason: HOSPADM

## 2018-07-16 RX ORDER — MAGNESIUM HYDROXIDE 1200 MG/15ML
1000 LIQUID ORAL CONTINUOUS PRN
Status: DISCONTINUED | OUTPATIENT
Start: 2018-07-16 | End: 2018-07-16 | Stop reason: HOSPADM

## 2018-07-16 RX ORDER — DOPAMINE HYDROCHLORIDE 160 MG/100ML
2-20 INJECTION, SOLUTION INTRAVENOUS CONTINUOUS PRN
Status: DISCONTINUED | OUTPATIENT
Start: 2018-07-16 | End: 2018-07-16 | Stop reason: HOSPADM

## 2018-07-16 RX ORDER — EPTIFIBATIDE 2 MG/ML
2 INJECTION, SOLUTION INTRAVENOUS CONTINUOUS PRN
Status: DISCONTINUED | OUTPATIENT
Start: 2018-07-16 | End: 2018-07-16 | Stop reason: HOSPADM

## 2018-07-16 RX ORDER — DOBUTAMINE HYDROCHLORIDE 200 MG/100ML
2-20 INJECTION INTRAVENOUS CONTINUOUS PRN
Status: DISCONTINUED | OUTPATIENT
Start: 2018-07-16 | End: 2018-07-16 | Stop reason: HOSPADM

## 2018-07-16 RX ORDER — EPINEPHRINE 1 MG/ML
0.3 INJECTION, SOLUTION, CONCENTRATE INTRAVENOUS
Status: DISCONTINUED | OUTPATIENT
Start: 2018-07-16 | End: 2018-07-16 | Stop reason: HOSPADM

## 2018-07-16 RX ORDER — NITROGLYCERIN 0.4 MG/1
0.4 TABLET SUBLINGUAL EVERY 5 MIN PRN
Status: DISCONTINUED | OUTPATIENT
Start: 2018-07-16 | End: 2018-07-17 | Stop reason: HOSPADM

## 2018-07-16 RX ORDER — METOPROLOL TARTRATE 1 MG/ML
5 INJECTION, SOLUTION INTRAVENOUS EVERY 5 MIN PRN
Status: DISCONTINUED | OUTPATIENT
Start: 2018-07-16 | End: 2018-07-16 | Stop reason: HOSPADM

## 2018-07-16 RX ORDER — LIDOCAINE 40 MG/G
CREAM TOPICAL
Status: DISCONTINUED | OUTPATIENT
Start: 2018-07-16 | End: 2018-07-17 | Stop reason: HOSPADM

## 2018-07-16 RX ORDER — ENALAPRILAT 1.25 MG/ML
1.25-2.5 INJECTION INTRAVENOUS
Status: DISCONTINUED | OUTPATIENT
Start: 2018-07-16 | End: 2018-07-16 | Stop reason: HOSPADM

## 2018-07-16 RX ORDER — SODIUM NITROPRUSSIDE 25 MG/ML
100-200 INJECTION INTRAVENOUS
Status: DISCONTINUED | OUTPATIENT
Start: 2018-07-16 | End: 2018-07-16 | Stop reason: HOSPADM

## 2018-07-16 RX ORDER — ATROPINE SULFATE 0.1 MG/ML
.5-1 INJECTION INTRAVENOUS
Status: DISCONTINUED | OUTPATIENT
Start: 2018-07-16 | End: 2018-07-16 | Stop reason: HOSPADM

## 2018-07-16 RX ADMIN — FENTANYL CITRATE 25 MCG: 50 INJECTION, SOLUTION INTRAMUSCULAR; INTRAVENOUS at 16:27

## 2018-07-16 RX ADMIN — FENTANYL CITRATE 25 MCG: 50 INJECTION, SOLUTION INTRAMUSCULAR; INTRAVENOUS at 16:38

## 2018-07-16 RX ADMIN — HEPARIN SODIUM 11000 UNITS: 1000 INJECTION, SOLUTION INTRAVENOUS; SUBCUTANEOUS at 16:37

## 2018-07-16 RX ADMIN — NITROGLYCERIN 0.4 MG: 0.4 TABLET SUBLINGUAL at 12:59

## 2018-07-16 RX ADMIN — MIDAZOLAM 1 MG: 1 INJECTION INTRAMUSCULAR; INTRAVENOUS at 16:20

## 2018-07-16 RX ADMIN — HEPARIN SODIUM 500 UNITS: 1000 INJECTION, SOLUTION INTRAVENOUS; SUBCUTANEOUS at 16:18

## 2018-07-16 RX ADMIN — FENTANYL CITRATE 50 MCG: 50 INJECTION, SOLUTION INTRAMUSCULAR; INTRAVENOUS at 16:17

## 2018-07-16 RX ADMIN — MIDAZOLAM 1 MG: 1 INJECTION INTRAMUSCULAR; INTRAVENOUS at 16:38

## 2018-07-16 RX ADMIN — TICAGRELOR 90 MG: 90 TABLET ORAL at 21:30

## 2018-07-16 RX ADMIN — MIDAZOLAM 0.5 MG: 1 INJECTION INTRAMUSCULAR; INTRAVENOUS at 16:29

## 2018-07-16 RX ADMIN — MIDAZOLAM 0.5 MG: 1 INJECTION INTRAMUSCULAR; INTRAVENOUS at 16:37

## 2018-07-16 RX ADMIN — FENTANYL CITRATE 50 MCG: 50 INJECTION, SOLUTION INTRAMUSCULAR; INTRAVENOUS at 16:52

## 2018-07-16 RX ADMIN — FENTANYL CITRATE 50 MCG: 50 INJECTION, SOLUTION INTRAMUSCULAR; INTRAVENOUS at 16:20

## 2018-07-16 RX ADMIN — SODIUM CHLORIDE 500 ML: 9 INJECTION, SOLUTION INTRAVENOUS at 18:51

## 2018-07-16 RX ADMIN — MIDAZOLAM 1 MG: 1 INJECTION INTRAMUSCULAR; INTRAVENOUS at 16:18

## 2018-07-16 RX ADMIN — ASPIRIN 81 MG CHEWABLE TABLET 324 MG: 81 TABLET CHEWABLE at 12:19

## 2018-07-16 RX ADMIN — ONDANSETRON 4 MG: 2 INJECTION INTRAMUSCULAR; INTRAVENOUS at 12:19

## 2018-07-16 RX ADMIN — IOPAMIDOL 60 ML: 755 INJECTION, SOLUTION INTRAVASCULAR at 17:00

## 2018-07-16 ASSESSMENT — ENCOUNTER SYMPTOMS
NAUSEA: 1
SHORTNESS OF BREATH: 1
LIGHT-HEADEDNESS: 1

## 2018-07-16 NOTE — DISCHARGE SUMMARY
[address] [address]  p: [phone]    Discharge Summary: Cardiology Service    Travon Livingston MRN# [id number]   YOB: 1975 Age: 43 year old       Admission Date: 7/16/2018  Discharge Date: 07/17/18      Discharge Diagnoses:  1. Unstable angina  2. CAD   3. HLD       Pertinent Procedures:  1. Coronary angiogram    Consults:  NA    Imaging with results:  Coronary Angiogram 7/16/2018:  1. Both coronary arteries arise from their respective cusps.  2. Dominance: Left  3. The LM is short without angiographic evidence of disease.   4. LAD: Type 3 [LAD supplies the entire apex]. The LAD is notable for two stents in the proximal and mid vessel.  There is mild diffuse irregularities throughout the vessel and minimal in-stent restenosis throughout the stents.  Proximal to the first LAD stent is a stenosis of 25%.  There are two smaller diagonals before a third medium sized diagonal branch that has mild diffuse disease and 20% ostial stenosis.  5.  The LCx is a large vessel with large OM1 and OM2.  The LCx and OM branches are angiographically without disease.  6.  The RCA demonstrates previously placed proximal stent.  There is 20% stenosis at the ostium.  The stent appears patent.  The mid-RCA has a 95% area of focal stenosis with less than 10% stenosis in the distal vessel.    TTE 7/16/2018  Interpretation Summary  Global and regional left ventricular function is normal with an EF of 55-60%.  Right ventricular function, chamber size, wall motion, and thickness are normal.  Both atria appear normal.  All valves are grossly normal.  Dilation of the inferior vena cava is present with normal respiratory variation in diameter.  No pericardial effusion is present.  Compared to echocardiogram 5/24/18, there are no significant changes.    Other imaging studies:  CXR - NAD    Brief HPI:  Travon Livingston is a 43 year old male with a history of  CAD s/p  "JORGE (Promus Element) to mLAD and dLAD as well as POBA to D2 in January 2013 followed by staged JORGE to pRCA; HLD; former tobacco use (quit 2011), and strong family history of early CAD with father MI at age 40 years. He presents for evaluation of chest pain and shortness of breath that he states is similar in nature to his prior ACS episode.    Hospital Course by Diagnosis:    Unstable Angina   CAD, history of PCI to LAD and RCA, POBA to D2 in 2013  Patient with symptoms of progressively worsening angina that crescendoed in the last 1-2 days where he feels restricted walking 1 city block 2/2 symptoms of chest pain, \"icy,\" \"pressure,\" that is relieved with rest/nitro as well as SOB. Initial EKG and labs were unremarkable, no ST segment elevation/depression or TWI. Taken to the cath lab given his history and the similarities between this episode and in 2013 when he last had PCI.  He was found to have culprit lesion to the RCA now status post PCI. Patient became nauseous, bradycardic and hypotensive following procedure, was given a fluid bolus, and monitored overnight without further issue.     -DAPT uninterrupted for 1 year: ASA 81mg qday + Ticagrelor 90mg bid   -Crestor at max dose, LDL: 77  -Continue Metoprolol 12.5 mg BID  -Cardiac rehab   -Follow up in 3-4 weeks or as arranged with cardiology    HLD   Last LDL was 134 (goal less than 70) on Crestor 40 mg. Per review, patient was intolerant to other statins. Recent recheck demonstrates LDL of 77.   -Continue max dose Crestor 40mg qday   -Follow up with labs/cardiology  -Encourage cardiac diet      Condition on discharge  Temp:  [97.8  F (36.6  C)-98.8  F (37.1  C)] 98.6  F (37  C)  Pulse:  [60-65] 60  Heart Rate:  [44-79] 66  Resp:  [9-16] 12  BP: ()/(41-82) 107/66  SpO2:  [78 %-100 %] 97 %  General: Alert, interactive, NAD   Eyes: sclera anicteric, EOMI  Neck: No JVD, carotid 2+ bilaterally  Cardiovascular: regular rate and rhythm, normal S1 and S2, no " murmurs, gallops, or rubs  Resp: clear to auscultation bilaterally, no rales, wheezes, or rhonchi  GI: Soft, nontender, nondistended. +BS.  No HSM or masses, no rebound or guarding.  Extremities: No edema, no cyanosis or clubbing, dorsalis pedis and posterior tibialis pulses 2+ bilaterally  Skin: Warm and dry, no jaundice or rash, groin incision clean/dry/intact without obvious hematoma  Neuro: CN 2-12 intact, moves all extremities equally  Psych: Alert & oriented x 3      Medication Changes:  START taking Brilinta (Ticagrelor) 90 mg twice daily       Discharge medications:   Current Discharge Medication List      CONTINUE these medications which have NOT CHANGED    Details   aspirin EC 81 MG EC tablet Take 1 tablet by mouth daily.    Associated Diagnoses: CAD (coronary artery disease)      lisinopril (PRINIVIL/ZESTRIL) 10 MG tablet Take 1 tablet (10 mg) by mouth daily  Qty: 90 tablet, Refills: 3    Associated Diagnoses: Coronary artery disease without angina pectoris, unspecified vessel or lesion type, unspecified whether native or transplanted heart; S/P angioplasty with stent      metoprolol tartrate (LOPRESSOR) 25 MG tablet Take 0.5 tablets (12.5 mg) by mouth 2 times daily  Qty: 135 tablet, Refills: 3    Associated Diagnoses: S/P angioplasty with stent; Coronary artery disease without angina pectoris, unspecified vessel or lesion type, unspecified whether native or transplanted heart      nitroGLYcerin (NITROSTAT) 0.4 MG sublingual tablet Place 1 tablet (0.4 mg) under the tongue every 5 minutes as needed for chest pain Can repeat x3, then call EMS  Qty: 25 tablet, Refills: 3    Associated Diagnoses: Coronary artery disease without angina pectoris, unspecified vessel or lesion type, unspecified whether native or transplanted heart; S/P angioplasty with stent      rosuvastatin (CRESTOR) 40 MG tablet Take 1 tablet (40 mg) by mouth daily  Qty: 90 tablet, Refills: 3    Associated Diagnoses: S/P angioplasty with  stent; Coronary artery disease without angina pectoris, unspecified vessel or lesion type, unspecified whether native or transplanted heart             Labs or imaging requiring follow-up after discharge:        Follow-up:  3-4 weeks with cardiology or as arranged   Complete a sleep study. They will call to schedule.    Code status:  Full    Pt was seen by, and discharge plan discussed with Dr. Kelley Fagan PA-C  [facility] Cardiology Team  [phone]      Patient Care Team:  Huseyin Noland PA-C as PCP - General (Physician Assistant)  Mary Ware as Clinic Care Coordinator (Cardiology)  Mehdi Gonzales MD as MD (Cardiology)  Irene Mckeon APRN CNP as Nurse Practitioner (Nurse Practitioner - [facility])        I have seen and examined the patient with the [facility] team. I agree with the assessment and plan of the discharge summary note above.I have reviewed pertinent labs. More than 30 minutes were spent organizing the patient's discharge.    Sammy Benson MD  Interventional Cardiology  Pager: [phone]

## 2018-07-16 NOTE — IP AVS SNAPSHOT
MRN:[id number]                      After Visit Summary   7/16/2018    Travon Livingston    MRN: [id number]           Thank you!     Thank you for choosing [facility] for your care. Our goal is always to provide you with excellent care. Hearing back from our patients is one way we can continue to improve our services. Please take a few minutes to complete the written survey that you may receive in the mail after you visit with us. Thank you!        Patient Information     Date Of Birth          1975        Designated Caregiver       Most Recent Value    Caregiver    Will someone help with your care after discharge? no      About your hospital stay     You were admitted on:  July 16, 2018 You last received care in the:  Unit 6D Observation [facility]    You were discharged on:  July 17, 2018        Reason for your hospital stay       You were admitted to the hospital following your coronary angiogram and stent placement.                  Who to Call     For medical emergencies, please call 911.  For non-urgent questions about your medical care, please call your primary care provider or clinic, [phone]          Attending Provider     Provider Specialty    Dez De Paz DO Emergency Medicine    Mehdi Gonzales MD Cardiology       Primary Care Provider Office Phone # Fax #    Huseyin Noland PA-C [phone]      After Care Instructions     Activity       Your activity upon discharge: ambulate as tolerated and no driving for today. Do not lift more than 10 pounds for the next 7 days.            Diet       Follow this diet upon discharge: Orders Placed This Encounter      Low Fat Diet                  Follow-up Appointments     Follow Up and recommended labs and tests       Please see a provider in the cardiology clinic within one month of discharge from the hospital.   Please schedule a sleep study.  Please complete cardiac rehab.                  Additional  "Services     CARDIAC REHAB REFERRAL       Patient may choose their preference of the site for Cardiac Rehab.            Cardiac Rehab Referral       *This therapy referral will be filtered to a centralized scheduling office at [facility] and the patient will receive a call to schedule an appointment at a [place] location most convenient for them.*     If you have not heard from the scheduling office within 2 business days, please call [phone] for all locations, with the exception of [place], please call [phone].    Please be aware that coverage of these services is subject to the terms and limitations of your health insurance plan.  Call member services at your health plan with any benefit or coverage questions.      **Note to Provider:  If you are referring outside of [place] for the therapy appointment, please list the name of the location in the \"special instructions\" above, print the referral and give to the patient to schedule the appointment.                   SLEEP EVALUATION & MANAGEMENT REFERRAL - ADULT -Other (Respond in commments) (Lives near [facility])       Please be aware that coverage of these services is subject to the terms and limitations of your health insurance plan.  Call member services at your health plan with any benefit or coverage questions.      Please bring the following to your appointment:    >>   List of current medications   >>   This referral request   >>   Any documents/labs given to you for this referral                      Further instructions from your care team       Care of groin site:         Remove the Band-Aid after 24 hours. If there is minor oozing, apply another Band-aid and remove it after 12 hours.          Do NOT take a bath, or use a hot tub or pool for at least 3 days. You may shower.          It is normal to have a small bruise or lump at the site.         Do not scrub the site.         Do not use lotion or powder near the " "puncture site for 3 days.         For the first 2 days: Do not stoop or squat. When you cough, sneeze or move your bowels, hold your hand over the puncture site and press gently.         Do not lift more than 10 pounds for at least 3 to 5 days.         For 2 days, do NOT have sex or do any heavy exercise.     If you start bleeding from the site in your groin:  Lie down flat and press firmly on the site.  Call your physician immediately, or, come to the emergency room.      Pending Results     Date and Time Order Name Status Description    7/16/2018 2300 EKG 12-lead, tracing only  Preliminary     7/16/2018 1858 Echocardiogram Complete In process     7/16/2018 1835 EKG 12-lead, tracing only Preliminary     7/16/2018 1714 EKG 12-lead, tracing only  Preliminary             Statement of Approval     Ordered          07/17/18 1112  I have reviewed and agree with all the recommendations and orders detailed in this document.  EFFECTIVE NOW     Approved and electronically signed by:  Eren Fagan, MARIA GUADALUPE             Admission Information     Date & Time Provider Department Dept. Phone    7/16/2018 Mehdi Gonzales MD Unit 6D Observation [facility] [phone]      Your Vitals Were     Blood Pressure Pulse Temperature Respirations Height Weight    125/81 60 98.6  F (37  C) (Oral) 12 1.905 m (6' 3\") 115 kg (253 lb 9.6 oz)    Pulse Oximetry BMI (Body Mass Index)                97% 31.7 kg/m2          MyChart Information     [employer] gives you secure access to your electronic health record. If you see a primary care provider, you can also send messages to your care team and make appointments. If you have questions, please call your primary care clinic.  If you do not have a primary care provider, please call [phone] and they will assist you.        Care EveryWhere ID     This is your Care EveryWhere ID. This could be used by other organizations to access your [facility] medical records  [id number]        Equal " Access to Services     [facility]: Hadii aad ku hadmoniqueallen Soruchi, waaxda luqadaha, qaybta kaalmales davis, alejandro mcbride. So [facility] [phone].    ATENCIÓN: Si habla español, tiene a krishnamurthy disposición servicios gratuitos de asistencia lingüística. Llame al [phone].    We comply with applicable federal civil rights laws and [place] laws. We do not discriminate on the basis of race, color, national origin, age, disability, sex, sexual orientation, or gender identity.               Review of your medicines      START taking        Dose / Directions    ticagrelor 90 MG tablet   Commonly known as:  BRILINTA   Used for:  CAD S/P percutaneous coronary angioplasty        Dose:  90 mg   Take 1 tablet (90 mg) by mouth 2 times daily   Quantity:  60 tablet   Refills:  1         CONTINUE these medicines which have NOT CHANGED        Dose / Directions    aspirin 81 MG EC tablet   Used for:  CAD (coronary artery disease)        Dose:  81 mg   Take 1 tablet by mouth daily.   Refills:  0       lisinopril 10 MG tablet   Commonly known as:  PRINIVIL/ZESTRIL   Used for:  Coronary artery disease without angina pectoris, unspecified vessel or lesion type, unspecified whether native or transplanted heart, S/P angioplasty with stent        Dose:  10 mg   Take 1 tablet (10 mg) by mouth daily   Quantity:  90 tablet   Refills:  3       metoprolol tartrate 25 MG tablet   Commonly known as:  LOPRESSOR   Used for:  S/P angioplasty with stent, Coronary artery disease without angina pectoris, unspecified vessel or lesion type, unspecified whether native or transplanted heart        Dose:  12.5 mg   Take 0.5 tablets (12.5 mg) by mouth 2 times daily   Quantity:  135 tablet   Refills:  3       nitroGLYcerin 0.4 MG sublingual tablet   Commonly known as:  NITROSTAT   Used for:  Coronary artery disease without angina pectoris, unspecified vessel or lesion type, unspecified whether native or transplanted heart, S/P  angioplasty with stent        Dose:  0.4 mg   Place 1 tablet (0.4 mg) under the tongue every 5 minutes as needed for chest pain Can repeat x3, then call EMS   Quantity:  25 tablet   Refills:  3       rosuvastatin 40 MG tablet   Commonly known as:  CRESTOR   Used for:  S/P angioplasty with stent, Coronary artery disease without angina pectoris, unspecified vessel or lesion type, unspecified whether native or transplanted heart        Dose:  40 mg   Take 1 tablet (40 mg) by mouth daily   Quantity:  90 tablet   Refills:  3            Where to get your medicines      These medications were sent to [place] [address] [place] [facility] [place] - [address]  [address] [place] [address]     Phone:  [phone]     ticagrelor 90 MG tablet                Protect others around you: Learn how to safely use, store and throw away your medicines at www.disposemymeds.org.             Medication List: This is a list of all your medications and when to take them. Check marks below indicate your daily home schedule. Keep this list as a reference.      Medications           Morning Afternoon Evening Bedtime As Needed    aspirin 81 MG EC tablet   Take 1 tablet by mouth daily.   Last time this was given:  81 mg on 7/17/2018  8:08 AM                                lisinopril 10 MG tablet   Commonly known as:  PRINIVIL/ZESTRIL   Take 1 tablet (10 mg) by mouth daily   Last time this was given:  10 mg on 7/17/2018  9:06 AM                                metoprolol tartrate 25 MG tablet   Commonly known as:  LOPRESSOR   Take 0.5 tablets (12.5 mg) by mouth 2 times daily   Last time this was given:  12.5 mg on 7/17/2018  9:06 AM                                nitroGLYcerin 0.4 MG sublingual tablet   Commonly known as:  NITROSTAT   Place 1 tablet (0.4 mg) under the tongue every 5 minutes as needed for chest pain Can repeat x3, then call EMS   Last time this was given:  0.4 mg on 7/16/2018 12:59 PM                                rosuvastatin 40  MG tablet   Commonly known as:  CRESTOR   Take 1 tablet (40 mg) by mouth daily   Last time this was given:  40 mg on 7/17/2018  8:08 AM                                ticagrelor 90 MG tablet   Commonly known as:  BRILINTA   Take 1 tablet (90 mg) by mouth 2 times daily   Last time this was given:  90 mg on 7/17/2018  8:08 AM                                          More Information      Cardiac Rehabilitation  Living your best life  If you have had a heart attack, valve or bypass surgery, stent placement or other heart problem, cardiac rehabilitation can help you return safely and more quickly to your normal life.  Our team includes doctors, exercise specialists, dietitians, pharmacists, chaplains/psychologists and social workers. We will help you create your plan for a heart-healthy lifestyle. It may include:    Quitting smoking    Regular exercise    Losing weight    Eating a healthy diet    Other lifestyle changes to improve health.  Team members will work with you to help you reach your goals.  Three phases of rehab  There are three phases: in hospital, therapy after the hospital and an exercise program (Wellness and Exercise for Life).  In the hospital  We will work with you to make you stronger and slowly increase your activity. You'll learn about risk factors for your heart, and we will monitor your heart to be sure you are ready to leave the hospital.  Exercise therapy  This phase starts soon after you leave the hospital. Your heart rhythm, blood pressure and heart rate are closely tracked in this exercise program. This will make sure you are safe while you are active.  The program is tailored to meet your personal goals. We will help you improve your heart and lung function, strengthen your muscles, and succeed in making lifestyle changes.  Exercise sessions will also help you return safely to your daily activities and work. You will have a chance to meet one-on-one with an exercise specialist to review the  changes you are making and to measure your progress. You and your family may also attend classes on exercise, nutrition, medicines, blood pressure, artery disease and stress management.  WEL (Wellness and Exercise for Life)  This ongoing exercise program provides a fun, inspiring setting for you to exercise with others who have similar goals. There is a fee. Our specialists help you increase your exercise workloads safely. They can help you learn how to have an active lifestyle and gain confidence to exercise on your own. This program includes supervised aerobic activity and exercises to make you stronger and more limber.  Getting started  You will need a referral from your doctor. We will work with you and your doctor to design a program that is right for you. Most health insurance plans will cover rehab programs, but it is best to confirm coverage with your insurer.  Locations  To make your first appointment, call [phone] or [phone]. In [place] and [place], call [phone].  [facility]  [address], [address]  [place] [address]  [facility]  [address]  [place] [address]  [facility]  [address]  [place] [address]  [address] [address]  [facility]  [address] [place] [address]  [facility]  [address] [place] [address]  Visit [email or web address] for more information.  For informational purposes only. Not to replace the advice of your health care provider.  Copyright   2015 [facility]. All rights reserved. [employer]  398595 - REV 02/16.            Eating Heart-Healthy Foods  Eating has a big impact on your heart health. In fact, eating healthier can improve several of your heart risks at once. For instance, it helps you manage weight, cholesterol, and blood pressure. Here are ideas to help you make heart-healthy changes without giving up all the foods and flavors you love.  Getting started    Talk with your healthcare provider about eating plans, such as the DASH or Mediterranean diet. You may also be referred to a dietitian.    Change a few things at a time. Give yourself time to get used to a few eating changes before adding more.    Work to create a tasty, healthy eating plan that you can stick to for the rest of your life.    Goals for healthy eating  Below are some tips to improve your eating habits:    Limit saturated fats and trans fats. Saturated fats raise your levels of cholesterol, so keep these fats to a minimum. They are found in foods such as fatty meats, whole milk, cheese, and palm and coconut oils. Avoid trans fats because they lower good cholesterol as well as raise bad cholesterol. Trans fats are most often found in processed foods.    Reduce sodium (salt) intake. Eating too much salt may increase your blood pressure. Limit your sodium intake to 2,300 milligrams (mg) per day (the amount in 1 teaspoon of salt), or less if your healthcare provider recommends it. Dining out less often and eating fewer processed foods are two great ways to decrease the amount of salt you consume.    Managing calories. A calorie is a unit of energy. Your body burns calories for fuel, but if you eat more calories than your body burns, the extras are stored as fat. Your healthcare provider can help you create a diet plan to manage your calories. This will likely include eating healthier foods as well as exercising regularly. To help you track your progress, keep a diary to record what you eat and how often you exercise.  Choose the  right foods  Aim to make these foods staples of your diet. If you have diabetes, you may have different recommendations than what is listed here:    Fruits and vegetables provide plenty of nutrients without a lot of calories. At meals, fill half your plate with these foods. Split the other half of your plate between whole grains and lean protein.    Whole grains are high in fiber and rich in vitamins and nutrients. Good choices include whole-wheat bread, pasta, and brown rice.    Lean proteins give you nutrition with less fat. Good choices include fish, skinless chicken, and beans.    Low-fat or nonfat dairy provides nutrients without a lot of fat. Try low-fat or nonfat milk, cheese, or yogurt.    Healthy fats can be good for you in small amounts. These are unsaturated fats, such as olive oil, nuts, and fish. Try to have at least 2 servings per week of fatty fish, such as salmon, sardines, mackerel, rainbow trout, and albacore tuna. These contain omega-3 fatty acids, which are good for your heart. Flaxseed is another source of a heart-healthy fat.  More on heart-healthy eating  Read food labels  Healthy eating starts at the grocery store. Be sure to pay attention to food labels on packaged foods. Look for products that are high in fiber and protein, and low in saturated fat, cholesterol, and sodium. Avoid products that contain trans fat. And pay close attention to serving size. For instance, if you plan to eat two servings, double all the numbers on the label.  Prepare food right  A key part of healthy cooking is cutting down on added fat and salt. Look on the internet for lower-fat, lower-sodium recipes. Also, try these tips:    Remove fat from meat and skin from poultry before cooking.    Skim fat from the surface of soups and sauces.    Broil, boil, bake, steam, grill, and microwave food without added fats.    Choose ingredients that spice up your food without adding calories, fat, or sodium. Try these items:  horseradish, hot sauce, lemon, mustard, nonfat salad dressings, and vinegar. For salt-free herbs and spices, try basil, cilantro, cinnamon, pepper, and rosemary.  Date Last Reviewed: 10/1/2017    0293-7133 [employer]. [address] [place] [address]. All rights reserved. This information is not intended as a substitute for professional medical care. Always follow your healthcare professional's instructions.                Discharge Instructions for Cardiac Catheterization  Cardiac catheterization is a procedure to look for blocked areas in the blood vessels that send blood to the heart. A thin, flexible tube (catheter) is put in a blood vessel in your groin or arm. The healthcare provider injects contrast fluid into your blood, which then flows to your heart. X-rays pictures are taken of your heart. Your provider will review the results with you. Be sure to ask any questions you have before you leave. This sheet will help you take care of yourself at home.  Home care    Don't drive or make any important decisions for at least 24 hours after receiving any type of sedation or anesthesia.     Arrange to have a responsible adult drive you home after your procedure.    Only do light and easy activities for the next 2 to 3 days. Ask for help with chores and errands while you recover. Have someone drive you to your appointments.    Don't lift anything heavy for a while. Your healthcare team will tell you when it's safe to lift again.    Ask your healthcare team when you can expect to return to work. Unless your job involves lifting, you may be able to return to your normal activities within a couple of days.    Take your medicines as directed. Don't skip doses.    Drink 6 to 8 glasses of water a day. This is to help flush the contrast dye out of your body. Call your healthcare team if your urine has any change in color.    Take your temperature each day for 7 days. If you feel cold and clammy or  start sweating, take your temperature right away and call your healthcare team.    Check your incisions every day for signs of infection. These include redness, swelling, and drainage. It is normal to have a small bruise or bump where the catheter was inserted. A bruise that is getting larger is not normal and should be reported to your healthcare team. If you see blood forming in the incision, call your healthcare team. Go to the emergency department if you have uncontrolled bleeding from the artery site. This is especially true if you take medicines that make it hard for your blood to clot. Examples are aspirin, clopidogrel, and warfarin.    Eat a healthy diet. Make sure it is low in fat, salt, and cholesterol. Ask your healthcare team for diet information.    Stop smoking. Enroll in a stop-smoking program or ask your healthcare team for help. Stop-smoking programs can be life saving.    Exercise as your healthcare team tells you to. Your healthcare team may recommend you start a cardiac rehabilitation program. Cardiac rehab is an exercise program in which trained healthcare staff watch your progress and stress on your heart while you exercise. Ask your team how to enroll.    Don't swim or take baths until your healthcare team says it s OK. You can shower the day after the procedure. Keep the site clean and dry. This keeps the incision from getting wet and infected until the skin and artery can heal.    Be sure to follow all after-care instructions.   Follow-up care    Make a follow-up appointment as advised by our staff. It's common to have a follow-up appointment 2 to 4 weeks after an angioplasty or coronary stent procedure.    Make a yearly appointment, too. This is to make sure you are still doing well and not having any new symptoms.    Don't wait for a follow-up appointment if your medicines aren't working or you are having heart-related symptoms.  When to seek medical care  Call your healthcare provider  right away if you have any of the following:    Chest pain    Constant or increasing pain or numbness in your leg    Fever of 100.4 F (38.0 C) or higher, or as directed by your healthcare provider    Symptoms of infection. These include redness, swelling, drainage, or warmth at the incision site.    Shortness of breath    A leg that feels cold or appears blue    Bleeding, bruising, or a lot of swelling where the catheter was inserted    Blood in your urine    Black or tarry stools    Any unusual bleeding   Date Last Reviewed: 10/1/2016    3822-3836 [employer] [address] [place] [address]. All rights reserved. This information is not intended as a substitute for professional medical care. Always follow your healthcare professional's instructions.

## 2018-07-16 NOTE — ED PROVIDER NOTES
"  History     Chief Complaint   Patient presents with     Chest Pain     Shortness of Breath     HPI  Travon Livingston is a 43 year old male with a history of high cholesterol, MI s/p stent placement (x3) who presents for evaluation of chest pain and shortness of breath. Patient complains over the past two weeks he has had chest pain and shortness of breath worse when exercising. Currently, he describes his chest pain as  like \"I've got an ice cube on my chest\" with associated sensation of chest heaviness. He also complains of nausea and lightheadedness. His current symptoms do feel similar to symptoms he had with his prior MI. He did not take any medications for his symptoms prior to arrival. He not anticoagulated.     I have reviewed the Medications, Allergies, Past Medical and Surgical History, and Social History in the [employer] system.  Past Medical History:   Diagnosis Date     High cholesterol      Myocardial infarction        Past Surgical History:   Procedure Laterality Date     coronary stints[  1/24/2013       Family History   Problem Relation Age of Onset     C.A.D. Father        Social History   Substance Use Topics     Smoking status: Former Smoker     Packs/day: 1.00     Years: 15.00     Types: Cigarettes     Quit date: 8/23/2011     Smokeless tobacco: Never Used     Alcohol use Yes      Comment: daily, 1 to 2 beers        Current Facility-Administered Medications   Medication     nitroGLYcerin (NITROSTAT) sublingual tablet 0.4 mg     ondansetron (ZOFRAN) injection 4 mg     Current Outpatient Prescriptions   Medication     aspirin EC 81 MG EC tablet     lisinopril (PRINIVIL/ZESTRIL) 10 MG tablet     metoprolol tartrate (LOPRESSOR) 25 MG tablet     nitroGLYcerin (NITROSTAT) 0.4 MG sublingual tablet     rosuvastatin (CRESTOR) 40 MG tablet      No Known Allergies    Review of Systems   Respiratory: Positive for shortness of breath.    Cardiovascular: Positive for chest pain.   Gastrointestinal: Positive for " "nausea.   Neurological: Positive for light-headedness.       Physical Exam   BP: 137/81  Pulse: 65  Heart Rate: 65  Temp: 98.2  F (36.8  C)  Resp: 16  Height: 190.5 cm (6' 3\")  Weight: 115 kg (253 lb 9.6 oz)  SpO2: 98 %    Results for orders placed or performed during the hospital encounter of 07/16/18   Chest  XR, 1 view portable    Narrative    XR CHEST PORT 1 VW 7/16/2018 1:12 PM    History: chest pain     Comparison: Chest x-ray 12/22/2014    Findings: Heart and thoracic vasculature within normal limits. No  pneumothorax or large pleural effusion. No focal lung consolidation.       Impression    Impression: No acute airspace disease.     DAHLIA YEUNG MD   CBC with platelets differential   Result Value Ref Range    WBC 7.0 4.0 - 11.0 10e9/L    RBC Count 5.09 4.4 - 5.9 10e12/L    Hemoglobin 16.2 13.3 - 17.7 g/dL    Hematocrit 46.6 40.0 - 53.0 %    MCV 92 78 - 100 fl    MCH 31.8 26.5 - 33.0 pg    MCHC 34.8 31.5 - 36.5 g/dL    RDW 12.3 10.0 - 15.0 %    Platelet Count 261 150 - 450 10e9/L    Diff Method Automated Method     % Neutrophils 62.1 %    % Lymphocytes 29.0 %    % Monocytes 6.6 %    % Eosinophils 1.6 %    % Basophils 0.6 %    % Immature Granulocytes 0.1 %    Nucleated RBCs 0 0 /100    Absolute Neutrophil 4.3 1.6 - 8.3 10e9/L    Absolute Lymphocytes 2.0 0.8 - 5.3 10e9/L    Absolute Monocytes 0.5 0.0 - 1.3 10e9/L    Absolute Eosinophils 0.1 0.0 - 0.7 10e9/L    Absolute Basophils 0.0 0.0 - 0.2 10e9/L    Abs Immature Granulocytes 0.0 0 - 0.4 10e9/L    Absolute Nucleated RBC 0.0    Comprehensive metabolic panel   Result Value Ref Range    Sodium 138 133 - 144 mmol/L    Potassium 4.1 3.4 - 5.3 mmol/L    Chloride 105 94 - 109 mmol/L    Carbon Dioxide 26 20 - 32 mmol/L    Anion Gap 7 3 - 14 mmol/L    Glucose 96 70 - 99 mg/dL    Urea Nitrogen 10 7 - 30 mg/dL    Creatinine 0.87 0.66 - 1.25 mg/dL    GFR Estimate >90 >60 mL/min/1.7m2    GFR Estimate If Black >90 >60 mL/min/1.7m2    Calcium 8.9 8.5 - 10.1 mg/dL    " Bilirubin Total 0.6 0.2 - 1.3 mg/dL    Albumin 4.7 3.4 - 5.0 g/dL    Protein Total 8.0 6.8 - 8.8 g/dL    Alkaline Phosphatase 86 40 - 150 U/L    ALT 36 0 - 70 U/L    AST 19 0 - 45 U/L   Troponin I   Result Value Ref Range    Troponin I ES <0.015 0.000 - 0.045 ug/L   Troponin POCT   Result Value Ref Range    Troponin I 0.00 0.00 - 0.10 ug/L       Physical Exam   Constitutional: He is oriented to person, place, and time. He appears well-developed and well-nourished.   HENT:   Head: Normocephalic and atraumatic.   Eyes: EOM are normal. Pupils are equal, round, and reactive to light.   Neck: Normal range of motion. Neck supple.   Cardiovascular: Normal rate, regular rhythm, normal heart sounds and intact distal pulses.    No murmur heard.  Pulmonary/Chest: Effort normal and breath sounds normal. No respiratory distress. He has no wheezes. He has no rales.   Abdominal: He exhibits no distension. There is no tenderness.   Musculoskeletal: Normal range of motion.   Neurological: He is alert and oriented to person, place, and time.   Skin: No rash noted. No erythema. No pallor.          ED Course having a little bit of chest pain in the ED. EKG is normal.  Troponin is normal.  Pain in the ED by cardiology where she going to take him to the cath lab from the ER.     ED Course     Procedures       11:58 AM  The patient was seen and examined by Dr. Nicholas in Room 17.          EKG Interpretation:      Interpreted by Dez Nicholas DO  Time reviewed: 12:03  Symptoms at time of EKG: chest pain   Rhythm: normal sinus   Rate: 73 bpm  Axis: normal  Ectopy: none  Conduction: normal  ST Segments/ T Waves: No ST-T wave changes  Q Waves: none  Comparison to prior: Unchanged from 12/22/14    Clinical Impression: normal EKG             Labs Ordered and Resulted from Time of ED Arrival Up to the Time of Departure from the ED   CBC WITH PLATELETS DIFFERENTIAL   COMPREHENSIVE METABOLIC PANEL   TROPONIN I   PERIPHERAL IV CATHETER   ISTAT  TROPONIN NURSING POCT   TROPONIN POCT            Assessments & Plan (with Medical Decision Making): Chest pain.  Patient is going to the cath lab for catheterization and then he will be admitted under cardiology.       I have reviewed the nursing notes.    I have reviewed the findings, diagnosis, plan and need for follow up with the patient.    New Prescriptions    No medications on file       Final diagnoses:   Chest pain   IGlo, am serving as a trained medical scribe to document services personally performed by Dez De Paz DO, based on the provider's statements to me.      I, Dez De Paz DO, was physically present and have reviewed and verified the accuracy of this note documented by [employer].      7/16/2018   [facility], [place], EMERGENCY DEPARTMENT     Dez De Paz DO  07/16/18 [id number]

## 2018-07-16 NOTE — IP AVS SNAPSHOT
Unit 6D Observation [address] [address]    Phone:  [phone]    Fax:  [phone]                                       After Visit Summary   7/16/2018    Travon Livingston    MRN: [id number]           After Visit Summary Signature Page     I have received my discharge instructions, and my questions have been answered. I have discussed any challenges I see with this plan with the nurse or doctor.    ..........................................................................................................................................  Patient/Patient Representative Signature      ..........................................................................................................................................  Patient Representative Print Name and Relationship to Patient    ..................................................               ................................................  Date                                            Time    ..........................................................................................................................................  Reviewed by Signature/Title    ...................................................              ..............................................  Date                                                            Time

## 2018-07-16 NOTE — H&P
"  [facility]  Travon Livingston MRN: [id number]  1975  Date of Admission:7/16/2018  Primary care provider: Huseyin Noland         Chief Complaint:   Exertional chest pain, shortness of breath         History of Present Illness:   Travon Livingston is a 43 year old male with a history of  CAD s/p JORGE (Promus Element) to mLAD and dLAD as well as POBA to D2 in January 2013 followed by staged JORGE to pRCA; HLD; former tobacco use (quit 2011), and strong family history of early CAD with father MI at age 40 years. He presents for evaluation of chest pain and shortness of breath that he states is similar in nature to his prior ACS episode. Patient complains over the past four weeks he has had chest pain and shortness of breath that is worse when exercising and walking the dog, rises to a crescendo, and improves with rest. Prior to 4 weeks ago, he relates that he was active and without chest discomfort. Discomfort is described as \"having an ice cube\" on his chest with associated pressure localized on the left chest. He endorses intermittent radiation to back and jaw seemingly associated with activity, nausea, lightheadedness, and orthopnea. He does endorse heartburn symptoms, but states it does not occur with this chest pain. Denies leg pain, recent illness, fever, or cough. He did not take any medications for his symptoms prior to arrival. He is not anticoagulated.            Review of Systems:    10 point review of systems negative except for stated above in HPI.          Past Medical History:   Medical History reviewed.   Past Medical History:   Diagnosis Date     High cholesterol      Myocardial infarction              Past Surgical History:   Surgical History reviewed.   Past Surgical History:   Procedure Laterality Date     coronary stints[  1/24/2013             Social History:   Social History reviewed.  Social History   Substance Use Topics     Smoking status: Former " "Smoker     Packs/day: 1.00     Years: 15.00     Types: Cigarettes     Quit date: 8/23/2011     Smokeless tobacco: Never Used     Alcohol use Yes      Comment: daily, 1 to 2 beers              Family History:   Family History reviewed.   Family History   Problem Relation Age of Onset     C.A.D. Father              Allergies:   No Known Allergies          Medications:   Medications Reviewed.   Current Facility-Administered Medications   Medication     nitroGLYcerin (NITROSTAT) sublingual tablet 0.4 mg     ondansetron (ZOFRAN) injection 4 mg     Current Outpatient Prescriptions   Medication Sig     aspirin EC 81 MG EC tablet Take 1 tablet by mouth daily.     lisinopril (PRINIVIL/ZESTRIL) 10 MG tablet Take 1 tablet (10 mg) by mouth daily     metoprolol tartrate (LOPRESSOR) 25 MG tablet Take 0.5 tablets (12.5 mg) by mouth 2 times daily     nitroGLYcerin (NITROSTAT) 0.4 MG sublingual tablet Place 1 tablet (0.4 mg) under the tongue every 5 minutes as needed for chest pain Can repeat x3, then call EMS     rosuvastatin (CRESTOR) 40 MG tablet Take 1 tablet (40 mg) by mouth daily             Physical Exam:   Vitals were reviewed.  Blood pressure 112/69, pulse 65, temperature 98.2  F (36.8  C), temperature source Oral, resp. rate 10, height 1.905 m (6' 3\"), weight 115 kg (253 lb 9.6 oz), SpO2 98 %.    General: AAOx3, NAD  Skin: Not jaundiced, no rash, no ecchymoses  Neck: No JVD visible, trachea midline  HEENT: MMM, EOM intact  CV: RRR, normal S1S2, no murmur, clicks, rubs  Resp: Clear to auscultation bilaterally, no wheezes, rhonchi  Abd: Soft, non-tender, BS+, no masses appreciated  Extremities: warm and well perfused, palpable pulses, no edema  Neuro: No lateralizing symptoms or focal neurologic deficits        Labs:   Routine Labs:  Lab Results   Component Value Date    TROPI <0.015 07/16/2018     CMP    Recent Labs  Lab 07/16/18  1213      POTASSIUM 4.1   CHLORIDE 105   CO2 26   ANIONGAP 7   GLC 96   BUN 10   CR 0.87 "   GFRESTIMATED >90   GFRESTBLACK >90   TIERA 8.9   PROTTOTAL 8.0   ALBUMIN 4.7   BILITOTAL 0.6   ALKPHOS 86   AST 19   ALT 36     CBC  Recent Labs  Lab 07/16/18  1213   WBC 7.0   RBC 5.09   HGB 16.2   HCT 46.6   MCV 92   MCH 31.8   MCHC 34.8   RDW 12.3        INRNo lab results found in last 7 days.        Diagnostics:      Interpreted by Eren Fagan  Time reviewed:1400  Sinus rhythm,  Similar to previous      Imaging:   CXR 7/16/2018:  Findings: Heart and thoracic vasculature within normal limits. No  pneumothorax or large pleural effusion. No focal lung consolidation.          Impression: No acute airspace disease.     Assessment and Plan:     Travon Livingston is a 43 year old male with a history of  CAD s/p JORGE (Promus Element) to mLAD and dLAD as well as POBA to D2 in January 2013 followed by staged JORGE to pRCA; HLD; former tobacco use (quit 2011), and strong family history of early CAD with father MI at age 40 years. No acute findings on CXR. No ST-T changes, no change compared to previous ECG. Trop negative x1. VSS, no white count. His history is very typical for angina and similar to previous episodes of angina, has been progressively worsening in the last week. Given family history and personal history of early CAD, decision was made to send patient for coronary angiogram this afternoon. If the decision to stent is made, patient will require DAPT therapy x1 year.      1. Typical Chest pain - Patient with worsening angina that is exertional, usually with light exertion whereas he used to be able to perform exercise without significant limitation. Pain is substernal, occasionally radiates to neck, similar to pain in 2013 when patient had PCI to his LAD/RCA. No chest pain at rest. EKG without significant change. First troponin is negative. Risk factors: HLD (last LDL reportedly ~130 on Crestor 40 mg), history of tobacco use, strong family history (father with MI at age 40).  - Coronary angiogram this  afternoon, patient NPO since this AM, received 324 mg ASA   - Continue ASA 81 mg PO qd tomorrow, DAPT if PCI  - Continue Crestor 40 mg, per review intolerance to 80 mg qd   - Continue Lopressor 12.5 mg BID  - Patient admits diet is rich in salt/fat, encourage lifestyle modifications   - Monitor patient on tele, EKG stat if chest pain  - Patient should have cardiology follow up     2. Hypertension  - Continue PTA lisinopril 10mg qday    3. Hyperlipidemia  - Continue PTA crestor 40mg  - LDL on 2/15/2018: 134  - Consider increasing crestor - previously intolerant to higher doses (dyspepsia). Similar effects on pravastatin.          FEN: Cardiac diet  Prophylaxis:  DVT: Ambulation  Lines: Per cath lab  Disposition: Obs admission to [facility].   Code Status: FULL CODE      MARIA GUADALUPE Morfin  [facility] Cardiology Team  [phone]      I have seen and examined the patient and agree with the finding and plan.       Mehdi Gonzales MD  Cardiology-[facility]  [phone]

## 2018-07-16 NOTE — ED TRIAGE NOTES
Pt presents to ED with complaints of chest pain that started last night. Pt states it feels like a cold burning to the center of his chest. Pt has also felt SOB and like he was going to pass out at times. Pt has hx of cardiac stent placement in the past.

## 2018-07-16 NOTE — PHARMACY-ADMISSION MEDICATION HISTORY
Admission medication history interview status for the 7/16/2018 admission is complete. See Epic admission navigator for allergy information, pharmacy, prior to admission medications and immunization status.     Medication history interview sources:  Patient interview, .[phone]    Changes made to PTA medication list (reason)  Added: None  Deleted: None  Changed: None    Additional medication history information (including reliability of information, actions taken by pharmacist):  - Patient is a good historian  - lisinopril and metoprolol refilled for 90 days on 5/20  - rosuvastatin refilled last week  - has not taken any NTG tabs for his chest pain but has them at home  - all medications except for NTG tabs taken this AM PTA  - no allergies reported      Prior to Admission medications    Medication Sig Last Dose Taking? Auth Provider   aspirin EC 81 MG EC tablet Take 1 tablet by mouth daily. 7/16/2018 at Unknown time Yes Suzan Chaidez PA-C   lisinopril (PRINIVIL/ZESTRIL) 10 MG tablet Take 1 tablet (10 mg) by mouth daily 7/16/2018 at Unknown time Yes Huseyin Noland PA-C   metoprolol tartrate (LOPRESSOR) 25 MG tablet Take 0.5 tablets (12.5 mg) by mouth 2 times daily 7/16/2018 at Unknown time Yes Huseyin Noland PA-C   nitroGLYcerin (NITROSTAT) 0.4 MG sublingual tablet Place 1 tablet (0.4 mg) under the tongue every 5 minutes as needed for chest pain Can repeat x3, then call EMS  Yes Huseyin Noland PA-C   rosuvastatin (CRESTOR) 40 MG tablet Take 1 tablet (40 mg) by mouth daily 7/16/2018 at Unknown time Yes Huseyin Noland PA-C         Medication history completed by: Remington Vidal, PD4 Pharmacy Student

## 2018-07-16 NOTE — PROCEDURES
PRELIMINARY CARDIAC CATH REPORT:     PROCEDURES PERFORMED:   Coronary Angiography  Percutaneous Coronary Intervention    PHYSICIANS:  Attending Physician: Sammy Benson MD  Interventional Cardiology Fellow: Giovanni Sears MD  Cardiology Fellow: Arben Lizarraga MD    INDICATION:  Travon Livingston is a 43 year old male with progressive exertional chest pain, known coronary artery disease with prior stents to the RCA and LAD presenting for coronary angiogram.    DESCRIPTION:  1. Consent obtained with discussion of risks.  All questions were answered.  2. Sterile prep and procedure.  3. Location with Sheaths:   Rt Femoral Arterial  4 and 6 Fr sheaths  4. Access: Local anesthetic with lidocaine.  A standard 18 guage needle with ultrasound guidance was used to establish vascular access using a modified Seldinger technique.  5. Diagnostic Catheters:   4 Fr  JL5 and 3DRC  6 Fr JR4  6. Estimated blood loss: < 25 ml    MEDICATIONS:  The procedure was performed under conscious sedation for 32 minutes from 1617 to 1649.  The patient was assessed immediately before the first sedation medication was administered.  Midazolam 4 mg and Fentanyl 200 mcg were administered.  Heart rate, BP, respiration, oxygen saturation and patient responses were monitored throughout the procedure with the assistance of the RN under my supervision.  >> IV UFH [id number] U was administered to achieve anticoagulation.  >> Antiplatelet Therapy:  mg, Brilinta 180 mg given in cath lab    Procedures:    CORONARY ANGIOGRAM:   1. Both coronary arteries arise from their respective cusps.  2. Dominance: Left  3. The LM is short without angiographic evidence of disease.   4. LAD: Type 3 [LAD supplies the entire apex]. The LAD is notable for two stents in the proximal and mid vessel.  There is mild diffuse irregularities throughout the vessel and minimal in-stent restenosis throughout the stents.  Proximal to the first LAD stent is a stenosis of 25%.  There  are two smaller diagonals before a third medium sized diagonal branch that has mild diffuse disease and 20% ostial stenosis.  5.  The LCx is a large vessel with large OM1 and OM2.  The LCx and OM branches are angiographically without disease.  6.  The RCA demonstrates previously placed proximal stent.  There is 20% stenosis at the ostium.  The stent appears patent.  The mid-RCA has a 95% area of focal stenosis with less than 10% stenosis in the distal vessel.    PERCUTANEOUS CORONARY INTERVENTION:  PERCUTANEOUS CORONARY INTERVENTION:   Lesion #1: mid RCA    A 6 Fr JR 4 was positioned in the RCA. A  50 wire was advanced across the lesion and positioned in the distal RCA. A 2.0x12mm Emerge was used to pre dilate the mid RCA lesion. A 2.5x16mm Synergy stent was delivered and deployed. Final angiography showed no evidence of perforation or dissection with residual stenosis of 0% and RONNI 3 flow.  No complications.    Sheath Removal:  The right femoral artery sheath was manually removed in the cardiac catheterization laboratory.  Angioseal was placed.    Contrast: Isovue, 60 ml     Fluoroscopy Time: 9.1 min    COMPLICATIONS:  1. None    SUMMARY:   Successful deployment of a drug eluting stent to the mid RCA.                                                        PLAN:   >> ASA 81 mg qd and Ticagrelor 90 mg BID  >> Bedrest per protocol.  >> Continued medical management and lifestyle modification for cardiovascular risk factor optimization.   >> Admit for observation    The attending interventional cardiologist was present and supervised all critical aspects the procedure.    Findings discussed with patient/family at end of case.    See CVIS report for final draft.    Arben Lizarraga MD & Giovanni Sears MD  Cardiology [place]    Kwaku Benson MD   Cardiology Staff

## 2018-07-17 ENCOUNTER — APPOINTMENT (OUTPATIENT)
Dept: CARDIOLOGY | Facility: CLINIC | Age: 43
End: 2018-07-17
Attending: STUDENT IN AN ORGANIZED HEALTH CARE EDUCATION/TRAINING PROGRAM
Payer: COMMERCIAL

## 2018-07-17 VITALS
HEIGHT: 75 IN | RESPIRATION RATE: 12 BRPM | HEART RATE: 60 BPM | OXYGEN SATURATION: 97 % | DIASTOLIC BLOOD PRESSURE: 81 MMHG | SYSTOLIC BLOOD PRESSURE: 125 MMHG | BODY MASS INDEX: 31.53 KG/M2 | TEMPERATURE: 98.6 F | WEIGHT: 253.6 LBS

## 2018-07-17 LAB
ANION GAP SERPL CALCULATED.3IONS-SCNC: 10 MMOL/L (ref 3–14)
BUN SERPL-MCNC: 11 MG/DL (ref 7–30)
CALCIUM SERPL-MCNC: 8.6 MG/DL (ref 8.5–10.1)
CHLORIDE SERPL-SCNC: 109 MMOL/L (ref 94–109)
CHOLEST SERPL-MCNC: 145 MG/DL
CO2 SERPL-SCNC: 22 MMOL/L (ref 20–32)
CREAT SERPL-MCNC: 0.91 MG/DL (ref 0.66–1.25)
ERYTHROCYTE [DISTWIDTH] IN BLOOD BY AUTOMATED COUNT: 12.3 % (ref 10–15)
GFR SERPL CREATININE-BSD FRML MDRD: >90 ML/MIN/1.7M2
GLUCOSE SERPL-MCNC: 89 MG/DL (ref 70–99)
HCT VFR BLD AUTO: 42.5 % (ref 40–53)
HDLC SERPL-MCNC: 42 MG/DL
HGB BLD-MCNC: 14.7 G/DL (ref 13.3–17.7)
INTERPRETATION ECG - MUSE: NORMAL
LDLC SERPL CALC-MCNC: 77 MG/DL
MCH RBC QN AUTO: 31.7 PG (ref 26.5–33)
MCHC RBC AUTO-ENTMCNC: 34.6 G/DL (ref 31.5–36.5)
MCV RBC AUTO: 92 FL (ref 78–100)
NONHDLC SERPL-MCNC: 103 MG/DL
PLATELET # BLD AUTO: 240 10E9/L (ref 150–450)
POTASSIUM SERPL-SCNC: 4 MMOL/L (ref 3.4–5.3)
RBC # BLD AUTO: 4.63 10E12/L (ref 4.4–5.9)
SODIUM SERPL-SCNC: 142 MMOL/L (ref 133–144)
TRIGL SERPL-MCNC: 127 MG/DL
WBC # BLD AUTO: 8.6 10E9/L (ref 4–11)

## 2018-07-17 PROCEDURE — 25000132 ZZH RX MED GY IP 250 OP 250 PS 637: Performed by: PHYSICIAN ASSISTANT

## 2018-07-17 PROCEDURE — 25000132 ZZH RX MED GY IP 250 OP 250 PS 637: Performed by: INTERNAL MEDICINE

## 2018-07-17 PROCEDURE — 93306 TTE W/DOPPLER COMPLETE: CPT

## 2018-07-17 PROCEDURE — 25000125 ZZHC RX 250: Performed by: PHYSICIAN ASSISTANT

## 2018-07-17 PROCEDURE — 36415 COLL VENOUS BLD VENIPUNCTURE: CPT | Performed by: INTERNAL MEDICINE

## 2018-07-17 PROCEDURE — 80048 BASIC METABOLIC PNL TOTAL CA: CPT | Performed by: INTERNAL MEDICINE

## 2018-07-17 PROCEDURE — G0378 HOSPITAL OBSERVATION PER HR: HCPCS

## 2018-07-17 PROCEDURE — 85027 COMPLETE CBC AUTOMATED: CPT | Performed by: INTERNAL MEDICINE

## 2018-07-17 PROCEDURE — 25000132 ZZH RX MED GY IP 250 OP 250 PS 637: Performed by: STUDENT IN AN ORGANIZED HEALTH CARE EDUCATION/TRAINING PROGRAM

## 2018-07-17 PROCEDURE — 93306 TTE W/DOPPLER COMPLETE: CPT | Mod: 26 | Performed by: INTERNAL MEDICINE

## 2018-07-17 RX ADMIN — ROSUVASTATIN CALCIUM 40 MG: 40 TABLET, FILM COATED ORAL at 08:08

## 2018-07-17 RX ADMIN — TICAGRELOR 90 MG: 90 TABLET ORAL at 08:08

## 2018-07-17 RX ADMIN — ASPIRIN 81 MG: 81 TABLET, COATED ORAL at 08:08

## 2018-07-17 RX ADMIN — LISINOPRIL 10 MG: 10 TABLET ORAL at 09:06

## 2018-07-17 RX ADMIN — METOPROLOL TARTRATE 12.5 MG: 25 TABLET ORAL at 09:06

## 2018-07-17 NOTE — PROGRESS NOTES
Pt c/o of nausea. Pt diaphoretic. Pt HR in low 40's. BP 78/41. EKG ordered. Provider notified.  NS @ 125 ml/hr . HR increased to 60. BP 89/55. Will continue to monitor pt.

## 2018-07-17 NOTE — PLAN OF CARE
Problem: Patient Care Overview  Goal: Plan of Care/Patient Progress Review  Outpatient/Observation goals to be met before discharge home:    PRIMARY DIAGNOSIS: s/p PCI  OUTPATIENT/OBSERVATION GOALS TO BE MET BEFORE DISCHARGE:  Hemodynamically stable: YES-bolus completed and continuous fluid of 75ml/hr of the rest of the liter infusing; BP's WNL at this time and HR is WNL as well; will continue to monitor.  No chest pain: NO-pt not having any CP at this time.  *Nurse to notify MD when observation goals have been met and patient is ready for discharge.

## 2018-07-17 NOTE — PROGRESS NOTES
"/81  Pulse 60  Temp 98.6  F (37  C) (Oral)  Resp 12  Ht 1.905 m (6' 3\")  Wt 115 kg (253 lb 9.6 oz)  SpO2 97%  BMI 31.7 kg/m2  Condition is stable. Vital Signs are stable/WNL. Right groin site clean, dry and intact, cms intact.  Discharge instructions reviewed with patient and questions answered. Patient verbalizes understanding. IV removed. Pain under control. Patient is ambulating and voiding spontaneously. Patient is tolerating low fat/low sodium diet and denies any N/V. Patient to be discharged to home via wife. Patient has all belongings.    "

## 2018-07-17 NOTE — PROGRESS NOTES
CLINICAL NUTRITION SERVICES    Reason for Assessment:  Heart-healthy nutrition education, received consult.    Diet History:  Pt reports having received heart healthy diet education in the past (2013) per records.  He notes that he knows what to do, but he just has to do it.  His wife indicates that she tries, but patient doesn't listen.     Nutrition Diagnosis:  Limited adherence to diet recommendations r/t lifestyle habits, food preferences AEB pt report of not following heart healthy diet.     Nutrition Prescription/Recs:  Begin heart-healthy diet.      Interventions:  Nutrition Education  1.  Provided verbal instruction on meal preparation to avoid situations where he has to eat out or grab food on the go.   2.  Provided handouts:  Heart Health Guidelines (includes Heart Healthy Food Choices), Your Heart-Healthy Diet (Words You Will Hear), 10 Tips for Heart-Healthy Cooking, Dining Out With Your Heart In Mind, Recipe Changes for Heart-Healthy Cooking,  How to Read Nutrition Labels, Low-Sodium Foods and Drinks, Tips for a Low-Sodium Diet, and Heart Healthy Recipes.      Goals:    1.  Pt will verbalize at least four foods high in saturated or trans-fats and five foods high in sodium.    2.  Pt will list at least two interventions to make current meal plan more heart-healthy.     Follow-up:   Patient to ask any further nutrition-related questions before discharge.  In addition, pt may request outpatient RD appointment.    Lucila Thrasher MS, RD, LD  Pager [phone]

## 2018-07-17 NOTE — PLAN OF CARE
Problem: Patient Care Overview  Goal: Plan of Care/Patient Progress Review  Outpatient/Observation goals to be met before discharge home:    PRIMARY DIAGNOSIS: s/p PCI  OUTPATIENT/OBSERVATION GOALS TO BE MET BEFORE DISCHARGE:  Hemodynamically stable: NO-pt still having low BPs; pt has bolus infusing at this time; HR is WNL.  No chest pain: NO-pt not having any CP at this time.  *Nurse to notify MD when observation goals have been met and patient is ready for discharge.

## 2018-07-17 NOTE — PLAN OF CARE
"Problem: Patient Care Overview  Goal: Plan of Care/Patient Progress Review  Outcome: Improving  Outpatient/Observation goals to be met before discharge home:  /81  Pulse 60  Temp 98.6  F (37  C) (Oral)  Resp 12  Ht 1.905 m (6' 3\")  Wt 115 kg (253 lb 9.6 oz)  SpO2 97%  BMI 31.7 kg/m2    -Hemodynamically stable-YES   -No chest pain-YES, denies any chest pain    L groin site, CDI-CMS intact. Echo this am and d/c later today        "

## 2018-07-20 ENCOUNTER — TELEPHONE (OUTPATIENT)
Dept: FAMILY MEDICINE | Facility: CLINIC | Age: 43
End: 2018-07-20

## 2018-07-20 NOTE — TELEPHONE ENCOUNTER
This patient was discharged [facility] from on 7-17/18.    Discharge Diagnosis: S/P Angioplasty With Stent, Chest Pain     Follow-up instructions: Please see a provider in the cardiology clinic within one month of discharge from the hospital.   Please schedule a sleep study.  Please complete cardiac rehab.      A follow-up visit has not been scheduled.      Number of ED/ER visits in the last 12 months:  1     Please follow-up with patient.    Janice Waller,

## 2018-07-24 NOTE — TELEPHONE ENCOUNTER
Patient/family was instructed to return call to [facility] RN directly on the RN call back line at [phone]     Scar Issa RN

## 2018-07-25 NOTE — TELEPHONE ENCOUNTER
"Hospital/TCU/ED for chronic condition Discharge Protocol    \"Hi, my name is Scar Issa, a registered nurse, and I am calling from [facility].  I am calling to follow up and see how things are going for you after your recent emergency visit/hospital/TCU stay.\"    Tell me how you are doing now that you are home?\" great      Discharge Instructions    \"Let's review your discharge instructions.  What is/are the follow-up recommendations?  Pt. Response: Meet with cardiologist in 30 days, cardiac rehab    \"Has an appointment with your primary care provider been scheduled?\"   No (schedule appointment)-to follow up cardiology    \"When you see the provider, I would recommend that you bring your medications with you.\"    Medications    \"Tell me what changed about your medicines when you discharged?\"    Changes to chronic meds?    0-1    \"What questions do you have about your medications?\"    None     New diagnoses of heart failure, COPD, diabetes, or MI?    No              Medication reconciliation completed? Yes  Was MTM referral placed (*Make sure to put transitions as reason for referral)?   No    Call Summary    \"What questions or concerns do you have about your recent visit and your follow-up care?\"     none    \"If you have questions or things don't continue to improve, we encourage you contact us through the main clinic number (give number).  Even if the clinic is not open, triage nurses are available 24/7 to help you.     We would like you to know that our clinic has extended hours (provide information).  We also have urgent care (provide details on closest location and hours/contact info)\"      \"Thank you for your time and take care!\"      Scar Issa RN               "

## 2018-07-31 ENCOUNTER — HOSPITAL ENCOUNTER (OUTPATIENT)
Dept: CARDIAC REHAB | Facility: CLINIC | Age: 43
End: 2018-07-31
Attending: INTERNAL MEDICINE
Payer: COMMERCIAL

## 2018-07-31 VITALS — HEIGHT: 75 IN | WEIGHT: 251 LBS | BODY MASS INDEX: 31.21 KG/M2

## 2018-07-31 DIAGNOSIS — Z95.820 S/P ANGIOPLASTY WITH STENT: ICD-10-CM

## 2018-07-31 PROCEDURE — 40000575 ZZH STATISTIC OP CARDIAC VISIT #2

## 2018-07-31 PROCEDURE — 40000116 ZZH STATISTIC OP CR VISIT

## 2018-07-31 PROCEDURE — 93798 PHYS/QHP OP CAR RHAB W/ECG: CPT

## 2018-07-31 PROCEDURE — 93797 PHYS/QHP OP CAR RHAB WO ECG: CPT

## 2018-07-31 ASSESSMENT — 6 MINUTE WALK TEST (6MWT)
FEMALE CALC: 1871.92
MALE CALC: 2382.81
GENDER SELECTION: MALE
TOTAL DISTANCE WALKED (FT): 1932
PREDICTED: 2397.33

## 2018-07-31 NOTE — PROGRESS NOTES
Physician co signature/electronic signature indicates approval of this ITP document. I have established, reviewed and made necessary changes to the individualized treatment plan and exercise prescription for this patient.     07/31/18 1100   Session   Session Initial Evaluation and Exercise Prescription   Certified through this date 08/29/18   Cardiac Rehab Assessment   Cardiac Rehab Assessment Pt is 43-year-old male with a history of CAD s/p JORGE to mLAD and dLAD as well as POBA to D2 in January 2013 followed by stages JORGE to pRCA, HLD, former tobacco use, and family history of early CAD with father MI at 40-years-old. Pt would benefit greatly from attended cardiac rehab sessions and educations classes; all about your heart, exercise principles, blood pressure, nutrition 1 and 2, medication, relaxation response, and healing process. The patient's history and clinical status including hemodynamics and ECG were evaluated. The patient was assessed to be stable and appropriate to begin exercise.   The patient's functional capacity and exercise prescription were determined by the completion of the 6 minute walk test. See results below. The patient was oriented to the program.  Risk factor profile was completed. Goals and objectives were discussed. CV response was WNL. No symptoms, complaints or pain were reported. Good prognosis for reaching goals below. Skilled therapy is necessary in order to monitor CV response to exercise, to provide education on risk factors, and assist pt in establishing home exercise routine per cardiac rehab intensity.   Plan to progress to 30-40 minutes of exercise prior to discharge from cardiac rehab.  Initial THR of 20-30 beats above RHR; Effort rating of 4-6. Initiate muscle conditioning as appropriate. Provide risk factor education and behavior change counseling.    General Information   Treatment Diagnosis Stent   Date of Treatment Diagnosis 07/16/18   Secondary Treatment Diagnosis Stable  Angina   Significant Past CV History Previous PCI   Co morbidities None   Other Medical History Hyperlipidemia   Lead up symptoms SOB, chest pain tightness, radiate to jaw and neck; occurred while walking his dog    Hospital Location [facility]   Hospital Discharge Date 07/17/18   Signs and Symptoms Post Hospital Discharge None   Outpatient Cardiac Rehab Start Date 07/31/18   Primary Physician Huseyin Noland   Primary Physician Follow Up Completed   Cardiologist Dr. CHRISTY Pan   Cardiologist Follow Up Advised to schedule appointment   Ejection Fraction 55-60%   Risk Stratification Low   Summary of Cath Report   Summary of Cath Report Available   Date Performed 07/16/18   LAD Two stents in the proximal and mid vessel, there is mild irregularities throughout the vessel and minimal in-stent restenosis throughout the stents. Proximal to the first LAD stent is a stenosis of 25%. two smaller diagonals before a third medium sized diagonal branch that has mild diffuse disease and 20% ostial stenosis   RCA Previously placed proximal stent. 20% stenosis at the ostium. The mid-RCA 95% area of focal stenosis with less than 10% stenosis in the distal vessel   Living and Work Status    Living Arrangements and Social Status house   Support System Live with an adult   Return to Employment Yes   Occupation Supervisor/ Engineering   Preventative Medications   CMS recommended medications Antiplatelets;Ace inhibitors;Beta Blocker;Lipid Lowering   Fall Risk Screen   Fall screen completed by Cardiac Rehab   Have you fallen 2 or more times in the past year? No   Have you fallen and had an injury in the past year? No   Is patient a fall risk? No   Pain   Patient Currently in Pain Unable to assess   Physical Assessments   Incisions WNL   Edema Not assessed   Right Lung Sounds not assessed   Left Lung Sounds not assessed   Limitations No limitations   Individualized Treatment Plan   Monitored Sessions Scheduled 18   Monitored Sessions Attended 1  "  Oxygen   Supplemental Oxygen needed No   Nutrition Management - Weight Management   Assessment Initial Assessment   Age 43   Weight 113.9 kg (251 lb)   Height 1.905 m (6' 3\")   BMI (Calculated) 31.44   Goal Weight 90.7 kg (200 lb)   Initial Rate Your Plate Score. Dietary tool to assess eating patterns. Scores range from 24 to 72. The higher the score the healthier the eating pattern. 62   Weight Management Comments Pts goal weight is a long term goal. Goal weight for cardiac rehab is to lose 1-2 lbs per week.   Nutrition Management - Lipids   Lipids Labs Available   Date 07/16/18   Total Cholesterol 145   Triglycerides 127   HDL 42   LDL 77   Prescribed Lipid Medication Yes   Statin Intensity High Intensity   Nutrition Management - Diabetes   Diabetes No   Nutrition Management Summary   Dietary Recommendations Low Sodium;Low Fat;Low Cholesterol   Stages of Change for Diet Compliance Action   Interventions Planned Attend Nutrition Education Class(es);Instruct on Label Reading;Educate on Weight Management Principles;Educate on Benefits of Exercise   Patient Goals Goal #1   Goal #1 Description Attend both nutrition classes; 1 and 2   Goal #1 Target Date 10/11/18   Psychosocial Management   Psychosocial Assessment Initial   Is there history of clinical depression or increased risk of depression? No previous history   Current Level of Stress per Patient Report Moderate    Current Coping Skills Has Positive Support System;Patient Unable to Identify Personal Coping Skills   Initial Patient Health Questionnaire -9 Score (PHQ-9) for depression. 5-9 Minimal symptoms, 10-14 Minor depression, 15-19 Major depression, moderately severe, > 20 Major depression, severe  1   Initial [facility] Survey score.  Quality of Life:   If total score > 25 review individual areas where patient rated a 4 or 5.  Consider patients current medical condition and what role that plays on the score.   Adjust treatment protocol to improve areas " of concern.  Consider the following:  PHQ9 score, DASI, and re-assessment within the next 30 days to assist with developing treatments.  14   Stages of Change Maintenance   Interventions Planned Patient to verbalize understanding of behavioral assessment results;Patient to verbalize understanding of negative impact of stress to personal health;Patient will recognize signs and symptoms of depression;Patient to attend stress management class(es);Provide resources for stress relaxation   Other Core Components - Hypertension   History of or Diagnosis of Hypertension No   Currently taking Anti-Hypertensives Yes;Beta blocker;Ace Inhibitor   Other Core Components - Tobacco   History of Tobacco Use Yes   Quit Date or Planned Quit Date 02/07/11   Tobacco Use Status Former (Quit > 6 mo ago)   Tobacco Habit Cigarettes   Tobacco Use per Day (average) 15 yrs 1 PPD   Years of Tobacco Use 15   Stages of Change Maintenance   Other Core Components Summary   Interventions Planned Provide information on home blood pressure monitoring;List benefits of weight management;Educate patient regarding action steps for risk factor modification (lifestyle change/medications);Attend education class(es) on Nutrition   Patient Goals No   Activity/Exercise History   Activity/Exercise Assessment Initial   Activity/Exercise Status prior to event? Was Physically Active   Number of Days Currently participating in Moderate Physical Activity? 0   Number of Days Currently performing  Aerobic Exercise (including rehab)? 0   Number of Minutes per Session Currently of Aerobic Exercise (average)? 0   Current Stage of Change (Physical Activity) Preparation   Current Stage of Change (Aerobic Exercise) Preparation   Patient Goals Goal #1;Goal #2   Goal #1 Description Tolerate 45 minutes of jogging with his dog, continuous, 3-5 days per week, 4-6 on effort scale.   Goal #1 Target Date 10/11/18   Goal #2 Description Establish home exercise prior to discharge    Goal #2 Target Date 10/11/18   Activity/Exercise Target Outcome An Accumulation of 150  Minutes of Aerobic Activity per Week   Exercise Assessment   6 Minute Walk Predicted - Gender Selection Male   6 Minute Walk Predicted (Male) 2382.81   6 Minute Walk Predicted (Female) 1871.92   Initial 6 Minute Walk Distance (Feet) 1932 ft   Resting HR 66 bpm   Exercise  bpm   Post Exercise HR 80 bpm   Resting /64   Exercise /62   Post Exercise BP 92/64   Effort Rating 5   Current MET Level 3.8   MET Level Goal 6   ECG Rhythm Sinus rhythm   Ectopy None   Current Symptoms Denies symptoms   Limitations/Restrictions None   Exercise Prescription   Mode Treadmill;Nustep;Recumbant bike;Weights;LE strengthening;Elliptical   Duration/Time 30-45 min   Frequency 3 daysweek   THR (85% of age predicted max HR) 152.15   OMNI Effort Rating (0-10 Scale) 4-6/10   Progression Continuous bouts;Intermittent bouts;Total exercise time of 30-45 minutes;Aerobic exercise to OMNI rating of 5-7, and heart rate at or below target;Progress peak intensity by 1/4 MET per week;Progress peak intensity by 1/2 MET per week;Progress per high intensity interval training (HIIT) program   Recommended Home Exercise   Type of Exercise Walking   Frequency (days per week) 2-3   Duration (minutes per session) 30-45 min;Intermittent   Effort Rating Recommended 4-6/10   Current Home Exercise   Type of Exercise None   Frequency (days per week) 0   Duration (minutes per session) 0   Follow-up/On-going Support   Provider follow-up needed on the following No follow-up needed   Learning Assessment   Learner Patient   Primary Language English   Preferred Learning Style Listening;Reading;Demonstration;Pictures/Video   Barriers to Learning No barriers noted   Patient Education   Education recommended Anatomy and Physiology of the Heart;Blood Pressure;Exercise Principles;Muscle Conditioning;Risk Factors;Stress Management;Weight Loss;Nutrition   Pain Screening    Patient Currently in Pain Denies

## 2018-08-02 ENCOUNTER — HOSPITAL ENCOUNTER (OUTPATIENT)
Dept: CARDIAC REHAB | Facility: CLINIC | Age: 43
End: 2018-08-02
Attending: INTERNAL MEDICINE
Payer: COMMERCIAL

## 2018-08-02 PROCEDURE — 93798 PHYS/QHP OP CAR RHAB W/ECG: CPT | Performed by: REHABILITATION PRACTITIONER

## 2018-08-02 PROCEDURE — 40000116 ZZH STATISTIC OP CR VISIT: Performed by: REHABILITATION PRACTITIONER

## 2018-08-07 ENCOUNTER — HOSPITAL ENCOUNTER (OUTPATIENT)
Dept: CARDIAC REHAB | Facility: CLINIC | Age: 43
End: 2018-08-07
Attending: INTERNAL MEDICINE
Payer: COMMERCIAL

## 2018-08-07 PROCEDURE — 40000116 ZZH STATISTIC OP CR VISIT: Performed by: OCCUPATIONAL THERAPIST

## 2018-08-07 PROCEDURE — 93798 PHYS/QHP OP CAR RHAB W/ECG: CPT | Performed by: OCCUPATIONAL THERAPIST

## 2018-08-07 ASSESSMENT — 6 MINUTE WALK TEST (6MWT)
MALE CALC: 2388.05
TOTAL DISTANCE WALKED (FT): 1932
FEMALE CALC: 1878.75
GENDER SELECTION: MALE
PREDICTED: 2402.61

## 2018-08-15 VITALS — WEIGHT: 249 LBS | BODY MASS INDEX: 30.96 KG/M2 | HEIGHT: 75 IN

## 2018-08-15 NOTE — PROGRESS NOTES
Physician cosignature/electronic signature indicates approval of this ITP document. I have established, reviewed and made necessary changes to the individualized treatment plan and exercise prescription for this patient.     08/07/18 1700   Session   Session 30 Day Individualized Treatment Plan   Certified through this date 09/19/18   Cardiac Rehab Assessment   Cardiac Rehab Assessment Pt is 43-year-old male with a history of CAD s/p JORGE to mLAD and dLAD as well as stent to D2 in January 2013 followed by stages JORGE to pRCA, HLD, former tobacco use, and family history of early CAD with father MI at 40-years-old. Pt would benefit greatly from attended cardiac rehab sessions and educations classes; all about your heart, exercise principles, blood pressure, nutrition 1 and 2, medication, relaxation response, and healing process. 8/7 Pt has attended 3 sessions. Denies any pain or discomfort. continues to progess exercise as tolerated. Skilled therapy beneficial to monitor cv response to exercise, progress home exercise per cardiac rehab intensity, and educate on risk factor management.    General Information   Treatment Diagnosis Stent   Date of Treatment Diagnosis 07/16/18   Secondary Treatment Diagnosis Stable Angina   Significant Past CV History Previous PCI   Comorbidities None   Other Medical History Hyperlipidemia   Lead up symptoms SOB, chest pain tightness, radiate to jaw and neck; occured while walking his dog    Hospital [facility]   Hospital Discharge Date 07/17/18   Signs and Symptoms Post Hospital Discharge None   Outpatient Cardiac Rehab Start Date 07/31/18   Primary Physician Huseyin Noland   Primary Physician Follow Up Completed   Cardiologist Dr. CHRISTY Pan   Cardiologist Follow Up Advised to schedule appointment   Ejection Fraction 55-60%   Risk Stratification Low   Summary of Cath Report   Summary of Cath Report Available   Date Performed 07/16/18   LAD Two stents in the proximal and mid vessel,  "there is miild irregularities throughout the vessel and minimal in-stent restenosis throughout the stents. Proximal to the first LAD stent is a stenosis of 25%. two smaller diagonals before a thrid medium sized diagonal branch that has mild diffuse disease and 20% ostial stenosis   RCA Previously placed proximal stent. 20% stenosis at the ostium. The mid-RCA 95% area of focal stenosis with less than 10% stenosis in the distal vessel   Living and Work Status    Living Arrangements and Social Status house   Support System Live with an adult   Return to Employment Yes   Occupation Supervisor/ Engineering   Preventative Medications   CMS recommended medications Antiplatelets;Ace inhibitors;Beta Blocker;Lipid Lowering   Fall Risk Screen   Fall screen completed by Cardiac Rehab   Have you fallen 2 or more times in the past year? No   Have you fallen and had an injury in the past year? No   Is patient a fall risk? No   Pain   Patient Currently in Pain Unable to assess   Physical Assessments   Incisions WNL   Edema Not assessed   Right Lung Sounds not assessed   Left Lung Sounds not assessed   Limitations No limitations   Individualized Treatment Plan   Monitored Sessions Scheduled 18   Monitored Sessions Attended 3   Oxygen   Supplemental Oxygen needed No   Nutrition Management - Weight Management   Assessment Re-assessment   Age 43   Weight 112.9 kg (249 lb)   Height 1.905 m (6' 3\")   BMI (Calculated) 31.19   Goal Weight 90.7 kg (200 lb)   Initial Rate Your Plate Score. Dietary tool to assess eating patterns. Scores range from 24 to 72. The higher the score the healthier the eating pattern. 62   Weight Management Comments Pts goal weight is a long term goal. Goal weight for cardiac rehab is to lose 1-2 lbs per week.   Nutrition Management - Lipids   Lipids Labs Available   Date 07/16/18   Total Cholesterol 145   Triglycerides 127   HDL 42   LDL 77   Prescribed Lipid Medication Yes   Statin Intensity High Intensity "   Nutrition Management - Diabetes   Diabetes No   Nutrition Management Summary   Dietary Recommendations Low Sodium;Low Fat;Low Cholesterol   Stages of Change for Diet Compliance Action   Interventions Planned Attend Nutrition Education Class(es);Instruct on Label Reading;Educate on Weight Management Principles;Educate on Benefits of Exercise   Patient Goals Goal #1   Goal #1 Description Attend both nutrition classes; 1 and 2   Goal #1 Target Date 10/11/18   Goal #1 Progress Towards Goal 8/7/18 Pt planning to attend nutrition class   Psychosocial Management   Psychosocial Assessment Re-assessment   Is there history of clinical depression or increased risk of depression? No previous history   Current Level of Stress per Patient Report Moderate    Current Coping Skills Has Positive Support System;Patient Unable to Identify Personal Coping Skills   Initial Patient Health Questionnaire -9 Score (PHQ-9) for depression. 5-9 Minimal symptoms, 10-14 Minor depression, 15-19 Major depression, moderately severe, > 20 Major depression, severe  1   Initial [facility] Survey score.  Quality of Life:   If total score > 25 review individual areas where patient rated a 4 or 5.  Consider patients current medical condition and what role that plays on the score.   Adjust treatment protocol to improve areas of concern.  Consider the following:  PHQ9 score, DASI, and re-assessment within the next 30 days to assist with developing treatments.  14   Stages of Change Maintenance   Interventions Planned Patient to verbalize understanding of behavioral assessment results;Patient to verbalize understanding of negative impact of stress to personal health;Patient will recognize signs and symptoms of depression;Patient to attend stress management class(es);Provide resources for stress relaxation   Other Core Components - Hypertension   History of or Diagnosis of Hypertension No   Currently taking Anti-Hypertensives Yes;Beta blocker;Ace  Inhibitor   Other Core Components - Tobacco   History of Tobacco Use Yes   Quit Date or Planned Quit Date 02/07/11   Tobacco Use Status Former (Quit > 6 mo ago)   Tobacco Habit Cigarettes   Tobacco Use per Day (average) 15 yrs 1 PPD   Years of Tobacco Use 15   Stages of Change Maintenance   Other Core Components Summary   Interventions Planned Provide information on home blood pressure monitoring;List benefits of weight management;Educate patient regarding action steps for risk factor modification (lifestyle change/medications);Attend education class(es) on Nutrition   Patient Goals No   Activity/Exercise History   Activity/Exercise Assessment Re-assessment   Activity/Exercise Status prior to event? Was Physically Active   Number of Days Currently participating in Moderate Physical Activity? 0   Number of Days Currently performing  Aerobic Exercise (including rehab)? 2   Number of Minutes per Session Currently of Aerobic Exercise (average)? 30   Current Stage of Change (Physical Activity) Preparation   Current Stage of Change (Aerobic Exercise) Action   Patient Goals Goal #1;Goal #2   Goal #1 Description Tolerate 45 minutes of jogging with his dog, continous, 3-5 days per week, 4-6 on effort scale.   Goal #1 Target Date 10/11/18   Goal #1 Progress Towards Goal 8/7 Pt has attended 3 rehab sessions and is progessing MET level as tolerated   Goal #2 Description Establish home exercise prior to discharge   Goal #2 Target Date 10/11/18   Goal #2 Progress Towards Goal 8/7 Pt has not started regular home exercise routine   Activity/Exercise Target Outcome An Accumulation of 150  Minutes of Aerobic Activity per Week   Exercise Assessment   6 Minute Walk Predicted - Gender Selection Male   6 Minute Walk Predicted (Male) 2388.05   6 Minute Walk Predicted (Female) 1878.75   Initial 6 Minute Walk Distance (Feet) 1932 ft   Resting HR 85 bpm   Exercise  bpm   Post Exercise HR 80 bpm   Resting /60   Exercise /60    Post Exercise BP 92/64   Effort Rating 4   Current MET Level 3.9   MET Level Goal 6   ECG Rhythm Sinus rhythm   Ectopy None   Current Symptoms Denies symptoms   Limitations/Restrictions None   Exercise Prescription   Mode Treadmill;Nustep;Recumbant bike;Weights;LE strengthening;Elliptical   Duration/Time 30-45 min   Frequency 3 daysweek   THR (85% of age predicted max HR) 152.15   OMNI Effort Rating (0-10 Scale) 4-6/10   Progression Continuous bouts;Intermittent bouts;Total exercise time of 30-45 minutes;Aerobic exercise to OMNI rating of 5-7, and heart rate at or below target;Progress peak intensity by 1/4 MET per week;Progress peak intensity by 1/2 MET per week;Progress per high intensity interval training (HIIT) program   Recommended Home Exercise   Type of Exercise Walking   Frequency (days per week) 2-3   Duration (minutes per session) 30-45 min;Intermittent   Effort Rating Recommended 4-6/10   Current Home Exercise   Type of Exercise None   Frequency (days per week) 0   Duration (minutes per session) 0   Follow-up/On-going Support   Provider follow-up needed on the following No follow-up needed   Learning Assessment   Learner Patient   Primary Language English   Preferred Learning Style Listening;Reading;Demonstration;Pictures/Video   Barriers to Learning No barriers noted   Patient Education   Education recommended Anatomy and Physiology of the Heart;Blood Pressure;Exercise Principles;Muscle Conditioning;Risk Factors;Stress Management;Weight Loss;Nutrition   Pain Screening   Patient Currently in Pain Denies

## 2018-08-15 NOTE — ADDENDUM NOTE
Encounter addended by: Walt Cotton on: 8/15/2018  9:34 AM<BR>     Actions taken: Flowsheet data copied forward, Sign clinical note, Flowsheet accepted

## 2018-08-21 ENCOUNTER — HOSPITAL ENCOUNTER (OUTPATIENT)
Dept: CARDIAC REHAB | Facility: CLINIC | Age: 43
End: 2018-08-21
Attending: INTERNAL MEDICINE
Payer: COMMERCIAL

## 2018-08-21 PROCEDURE — 40000116 ZZH STATISTIC OP CR VISIT: Performed by: REHABILITATION PRACTITIONER

## 2018-08-21 PROCEDURE — 93798 PHYS/QHP OP CAR RHAB W/ECG: CPT | Performed by: REHABILITATION PRACTITIONER

## 2018-08-22 ENCOUNTER — HOSPITAL ENCOUNTER (OUTPATIENT)
Dept: CARDIAC REHAB | Facility: CLINIC | Age: 43
End: 2018-08-22
Attending: INTERNAL MEDICINE
Payer: COMMERCIAL

## 2018-08-22 PROCEDURE — 93798 PHYS/QHP OP CAR RHAB W/ECG: CPT

## 2018-08-22 PROCEDURE — 40000116 ZZH STATISTIC OP CR VISIT

## 2018-08-23 ENCOUNTER — HOSPITAL ENCOUNTER (OUTPATIENT)
Dept: CARDIAC REHAB | Facility: CLINIC | Age: 43
End: 2018-08-23
Attending: INTERNAL MEDICINE
Payer: COMMERCIAL

## 2018-08-23 PROCEDURE — 40000116 ZZH STATISTIC OP CR VISIT

## 2018-08-23 PROCEDURE — 93798 PHYS/QHP OP CAR RHAB W/ECG: CPT

## 2018-08-29 ENCOUNTER — HOSPITAL ENCOUNTER (OUTPATIENT)
Dept: CARDIAC REHAB | Facility: CLINIC | Age: 43
End: 2018-08-29
Attending: INTERNAL MEDICINE
Payer: COMMERCIAL

## 2018-08-29 PROCEDURE — 93797 PHYS/QHP OP CAR RHAB WO ECG: CPT | Performed by: REHABILITATION PRACTITIONER

## 2018-08-29 PROCEDURE — 40000575 ZZH STATISTIC OP CARDIAC VISIT #2: Performed by: REHABILITATION PRACTITIONER

## 2018-08-29 PROCEDURE — 40000116 ZZH STATISTIC OP CR VISIT: Performed by: REHABILITATION PRACTITIONER

## 2018-08-29 PROCEDURE — 93798 PHYS/QHP OP CAR RHAB W/ECG: CPT | Performed by: REHABILITATION PRACTITIONER

## 2018-09-04 ENCOUNTER — HOSPITAL ENCOUNTER (OUTPATIENT)
Dept: CARDIAC REHAB | Facility: CLINIC | Age: 43
End: 2018-09-04
Attending: INTERNAL MEDICINE
Payer: COMMERCIAL

## 2018-09-04 PROCEDURE — 40000116 ZZH STATISTIC OP CR VISIT

## 2018-09-04 PROCEDURE — 93798 PHYS/QHP OP CAR RHAB W/ECG: CPT

## 2018-09-05 ENCOUNTER — HOSPITAL ENCOUNTER (OUTPATIENT)
Dept: CARDIAC REHAB | Facility: CLINIC | Age: 43
End: 2018-09-05
Attending: INTERNAL MEDICINE
Payer: COMMERCIAL

## 2018-09-05 PROCEDURE — 93798 PHYS/QHP OP CAR RHAB W/ECG: CPT | Performed by: REHABILITATION PRACTITIONER

## 2018-09-05 PROCEDURE — 40000116 ZZH STATISTIC OP CR VISIT: Performed by: REHABILITATION PRACTITIONER

## 2018-09-06 ENCOUNTER — HOSPITAL ENCOUNTER (OUTPATIENT)
Dept: CARDIAC REHAB | Facility: CLINIC | Age: 43
End: 2018-09-06
Attending: INTERNAL MEDICINE
Payer: COMMERCIAL

## 2018-09-06 PROCEDURE — 40000116 ZZH STATISTIC OP CR VISIT

## 2018-09-06 PROCEDURE — 40000575 ZZH STATISTIC OP CARDIAC VISIT #2

## 2018-09-06 PROCEDURE — 93797 PHYS/QHP OP CAR RHAB WO ECG: CPT

## 2018-09-06 PROCEDURE — 93798 PHYS/QHP OP CAR RHAB W/ECG: CPT

## 2018-09-06 ASSESSMENT — 6 MINUTE WALK TEST (6MWT)
GENDER SELECTION: MALE
TOTAL DISTANCE WALKED (FT): 1932

## 2018-09-11 DIAGNOSIS — Z98.61 CAD S/P PERCUTANEOUS CORONARY ANGIOPLASTY: ICD-10-CM

## 2018-09-11 DIAGNOSIS — I25.10 CAD S/P PERCUTANEOUS CORONARY ANGIOPLASTY: ICD-10-CM

## 2018-09-13 ENCOUNTER — HOSPITAL ENCOUNTER (OUTPATIENT)
Dept: CARDIAC REHAB | Facility: CLINIC | Age: 43
End: 2018-09-13
Attending: INTERNAL MEDICINE
Payer: COMMERCIAL

## 2018-09-13 PROCEDURE — 93798 PHYS/QHP OP CAR RHAB W/ECG: CPT

## 2018-09-13 PROCEDURE — 40000116 ZZH STATISTIC OP CR VISIT

## 2018-09-14 VITALS — WEIGHT: 247 LBS | HEIGHT: 75 IN | BODY MASS INDEX: 30.71 KG/M2

## 2018-09-14 ASSESSMENT — 6 MINUTE WALK TEST (6MWT)
FEMALE CALC: 1885.58
TOTAL DISTANCE WALKED (FT): 1932
PREDICTED: 2407.89
GENDER SELECTION: MALE
MALE CALC: 2393.3

## 2018-09-14 NOTE — PROGRESS NOTES
Physician cosignature/electronic signature indicates approval of this ITP document. I have established, reviewed and made necessary changes to the individualized treatment plan and exercise prescription for this patient.   09/14/18 1400   Session   Session 60 Day Individualized Treatment Plan   Certified through this date 10/17/18   Cardiac Rehab Assessment   Cardiac Rehab Assessment Pt is 43-year-old male with a history of CAD s/p JORGE to mLAD and dLAD as well as stent to D2 in January 2013 followed by stages JORGE to pRCA, HLD, former tobacco use, and family history of early CAD with father MI at 40-years-old. Pt would benefit greatly from attended cardiac rehab sessions and educations classes; all about your heart, exercise principles, blood pressure, nutrition 1 and 2, medication, relaxation response, and healing process. 8/7 Pt has attended 3 sessions. Denies any pain or discomfort. continues to progess exercise as tolerated. 9/13 Pt has made excellent progress toward ex goals, he has lost some wt. but it fluctuates. He appreciates the ECG monitoring while he resumes higher intensity workload. Skilled services necessary to cont to monitor as he incr. duration of HIIT.    General Information   Treatment Diagnosis Stent   Date of Treatment Diagnosis 07/16/18   Secondary Treatment Diagnosis Stable Angina   Significant Past CV History Previous PCI   Comorbidities None   Other Medical History Hyperlipidemia   Lead up symptoms SOB, chest pain tightness, radiate to jaw and neck; occured while walking his dog    Hospital Location [facility]   Hospital Discharge Date 07/17/18   Signs and Symptoms Post Hospital Discharge None   Outpatient Cardiac Rehab Start Date 07/31/18   Primary Physician Huseyin Noland   Primary Physician Follow Up Completed   Cardiologist Dr. CHRISTY Pan   Cardiologist Follow Up Advised to schedule appointment   Ejection Fraction 55-60%   Risk Stratification Low   Summary of Cath Report   Summary of Cath  "Report Available   Date Performed 07/16/18   LAD Two stents in the proximal and mid vessel, there is miild irregularities throughout the vessel and minimal in-stent restenosis throughout the stents. Proximal to the first LAD stent is a stenosis of 25%. two smaller diagonals before a thrid medium sized diagonal branch that has mild diffuse disease and 20% ostial stenosis   RCA Previously placed proximal stent. 20% stenosis at the ostium. The mid-RCA 95% area of focal stenosis with less than 10% stenosis in the distal vessel   Living and Work Status    Living Arrangements and Social Status house   Support System Live with an adult   Return to Employment Yes   Occupation Supervisor/ Engineering   Preventative Medications   CMS recommended medications Antiplatelets;Ace inhibitors;Beta Blocker;Lipid Lowering   Fall Risk Screen   Fall screen completed by Cardiac Rehab   Have you fallen 2 or more times in the past year? No   Have you fallen and had an injury in the past year? No   Is patient a fall risk? No   Pain   Patient Currently in Pain Unable to assess   Physical Assessments   Incisions WNL   Edema Not assessed   Right Lung Sounds not assessed   Left Lung Sounds not assessed   Limitations No limitations   Individualized Treatment Plan   Monitored Sessions Scheduled 18   Monitored Sessions Attended 10   Oxygen   Supplemental Oxygen needed No   Nutrition Management - Weight Management   Assessment Re-assessment   Age 43   Weight 112 kg (247 lb)   Height 1.905 m (6' 3\")   BMI (Calculated) 30.94   Goal Weight 90.7 kg (200 lb)   Initial Rate Your Plate Score. Dietary tool to assess eating patterns. Scores range from 24 to 72. The higher the score the healthier the eating pattern. 62   Weight Management Comments Pts goal weight is a long term goal. Goal weight for cardiac rehab is to lose 1-2 lbs per week.   Nutrition Management - Lipids   Lipids Labs Available   Date 07/16/18   Total Cholesterol 145   Triglycerides 127 "   HDL 42   LDL 77   Prescribed Lipid Medication Yes   Statin Intensity High Intensity   Nutrition Management - Diabetes   Diabetes No   Nutrition Management Summary   Dietary Recommendations Low Sodium;Low Fat;Low Cholesterol   Stages of Change for Diet Compliance Action   Interventions Planned Attend Nutrition Education Class(es);Instruct on Label Reading;Educate on Weight Management Principles;Educate on Benefits of Exercise   Interventions In Progress or Completed Attended Nutrition Class(es)   Patient Goals Goal #1;Goal #2   Goal #1 Description Attend both nutrition classes; 1 and 2   Goal #1 Target Date 10/11/18   Goal #1 Progress Towards Goal 8/7/18 Pt planning to attend nutrition class 9/6 pt attended nutrition 1 but was unable to take off work for Nutrtion 2   Goal #2 Description Pt would like to loose wt. .5-2# per week.   Goal #2 Target Date 10/11/18   Goal #2 Progress Towards Goal 9/13 Pt is down ~3lbs, though his wt does fluctuate signif.    Psychosocial Management   Psychosocial Assessment Re-assessment   Is there history of clinical depression or increased risk of depression? No previous history   Current Level of Stress per Patient Report Moderate    Current Coping Skills Has Positive Support System;Patient Unable to Identify Personal Coping Skills   Initial Patient Health Questionnaire -9 Score (PHQ-9) for depression. 5-9 Minimal symptoms, 10-14 Minor depression, 15-19 Major depression, moderately severe, > 20 Major depression, severe  1   Initial [facility] Survey score.  Quality of Life:   If total score > 25 review individual areas where patient rated a 4 or 5.  Consider patients current medical condition and what role that plays on the score.   Adjust treatment protocol to improve areas of concern.  Consider the following:  PHQ9 score, DASI, and re-assessment within the next 30 days to assist with developing treatments.  14   Stages of Change Maintenance   Interventions Planned Patient to  verbalize understanding of behavioral assessment results;Patient to verbalize understanding of negative impact of stress to personal health;Patient will recognize signs and symptoms of depression;Patient to attend stress management class(es);Provide resources for stress relaxation   Interventions In Progress or Completed Patient verbalizes understanding of behavioral assessment scores;Patient verbalizes understanding of negative impact of stress to personal health;Patient continues to practice/follow through with strategies to reduce stress and/or anxiety level   Other Core Components - Hypertension   History of or Diagnosis of Hypertension No   Currently taking Anti-Hypertensives Yes;Beta blocker;Ace Inhibitor   Other Core Components - Tobacco   History of Tobacco Use Yes   Quit Date or Planned Quit Date 02/07/11   Tobacco Use Status Former (Quit > 6 mo ago)   Tobacco Habit Cigarettes   Tobacco Use per Day (average) 15 yrs 1 PPD   Years of Tobacco Use 15   Stages of Change Maintenance   Other Core Components Summary   Interventions Planned Provide information on home blood pressure monitoring;List benefits of weight management;Educate patient regarding action steps for risk factor modification (lifestyle change/medications);Attend education class(es) on Nutrition   Interventions In Progress or Completed Provided information on home blood pressure monitoring   Patient Goals No   Activity/Exercise History   Activity/Exercise Assessment Re-assessment   Activity/Exercise Status prior to event? Was Physically Active   Number of Days Currently participating in Moderate Physical Activity? 5   Number of Days Currently performing  Aerobic Exercise (including rehab)? 2   Number of Minutes per Session Currently of Aerobic Exercise (average)? 45   Current Stage of Change (Physical Activity) Preparation   Current Stage of Change (Aerobic Exercise) Action   Patient Goals Goal #1;Goal #2   Goal #1 Description Tolerate 45 minutes  of jogging with his dog, continous, 3-5 days per week, 4-6 on effort scale.   Goal #1 Target Date 10/11/18   Goal #1 Progress Towards Goal 8/7 Pt has attended 3 rehab sessions and is progessing MET level as tolerated 9/6 Pt has started HIIT and is joggin intermittently9/13 continues to progress duration of HIIT.    Goal #2 Description Establish home exercise prior to discharge   Goal #2 Target Date 10/11/18   Goal #2 Progress Towards Goal 8/7 Pt has not started regular home exercise routine 9/6 Pt has plan established with wife and [employer] after rehab. 9/13 Pt does ex. indep walk and progressing to intermittent jogging on his own as well.    Activity/Exercise Target Outcome An Accumulation of 150  Minutes of Aerobic Activity per Week   Exercise Assessment   6 Minute Walk Predicted - Gender Selection Male   6 Minute Walk Predicted (Male) 2393.3   6 Minute Walk Predicted (Female) 1885.58   Initial 6 Minute Walk Distance (Feet) 1932 ft   Resting HR 59 bpm  (Vitals from 9/13)   Exercise  bpm   Post Exercise HR 71 bpm   Resting /64   Exercise /54   Post Exercise /56   Effort Rating 5   Current MET Level 10.9  (peak)   MET Level Goal 6   ECG Rhythm Sinus rhythm   Ectopy None   Current Symptoms Denies symptoms   Limitations/Restrictions None   Exercise Prescription   Mode Treadmill;Nustep;Recumbant bike;Weights;LE strengthening;Elliptical   Duration/Time 30-45 min   Frequency 3 daysweek   THR (85% of age predicted max HR) 152.15   OMNI Effort Rating (0-10 Scale) 4-6/10   Progression Continuous bouts;Intermittent bouts;Total exercise time of 30-45 minutes;Aerobic exercise to OMNI rating of 5-7, and heart rate at or below target;Progress peak intensity by 1/4 MET per week;Progress peak intensity by 1/2 MET per week;Progress per high intensity interval training (HIIT) program   Recommended Home Exercise   Type of Exercise Walking   Frequency (days per week) 2-3   Duration (minutes per session)  30-45 min;Intermittent   Effort Rating Recommended 4-6/10   Current Home Exercise   Type of Exercise Walking   Frequency (days per week) 30   Duration (minutes per session) 30   Follow-up/On-going Support   Provider follow-up needed on the following No follow-up needed   Learning Assessment   Learner Patient   Primary Language English   Preferred Learning Style Listening;Reading;Demonstration;Pictures/Video   Barriers to Learning No barriers noted   Patient Education   Education recommended Anatomy and Physiology of the Heart;Blood Pressure;Exercise Principles;Muscle Conditioning;Risk Factors;Stress Management;Weight Loss;Nutrition   Education classes attended Nutrition

## 2018-09-18 DIAGNOSIS — Z98.61 CAD S/P PERCUTANEOUS CORONARY ANGIOPLASTY: ICD-10-CM

## 2018-09-18 DIAGNOSIS — I25.10 CAD S/P PERCUTANEOUS CORONARY ANGIOPLASTY: ICD-10-CM

## 2018-09-24 ENCOUNTER — MYC MEDICAL ADVICE (OUTPATIENT)
Dept: FAMILY MEDICINE | Facility: CLINIC | Age: 43
End: 2018-09-24

## 2018-09-24 ENCOUNTER — HOSPITAL ENCOUNTER (OUTPATIENT)
Dept: CARDIAC REHAB | Facility: CLINIC | Age: 43
End: 2018-09-24
Attending: INTERNAL MEDICINE
Payer: COMMERCIAL

## 2018-09-24 VITALS — HEIGHT: 75 IN | WEIGHT: 247 LBS | BODY MASS INDEX: 30.71 KG/M2

## 2018-09-24 DIAGNOSIS — Z98.61 CAD S/P PERCUTANEOUS CORONARY ANGIOPLASTY: ICD-10-CM

## 2018-09-24 DIAGNOSIS — I25.10 CAD S/P PERCUTANEOUS CORONARY ANGIOPLASTY: ICD-10-CM

## 2018-09-24 PROCEDURE — 40000116 ZZH STATISTIC OP CR VISIT

## 2018-09-24 PROCEDURE — 40000575 ZZH STATISTIC OP CARDIAC VISIT #2

## 2018-09-24 PROCEDURE — 93797 PHYS/QHP OP CAR RHAB WO ECG: CPT

## 2018-09-24 PROCEDURE — 93798 PHYS/QHP OP CAR RHAB W/ECG: CPT

## 2018-09-24 ASSESSMENT — 6 MINUTE WALK TEST (6MWT)
MALE CALC: 2393.3
TOTAL DISTANCE WALKED (FT): 2108
TOTAL DISTANCE WALKED (FT): 1932
PREDICTED: 2407.89
GENDER SELECTION: MALE
FEMALE CALC: 1885.58

## 2018-09-24 NOTE — PROGRESS NOTES
" 09/24/18 1000   Session   Session Discharge Note   Certified through this date 10/17/18   Cardiac Rehab Assessment   Cardiac Rehab Assessment Pt is 43-year-old male with a history of CAD s/p JORGE to mLAD and dLAD as well as stent to D2 in January 2013 followed by stages JORGE to pRCA, HLD, former tobacco use, and family history of early CAD with father MI at 40-years-old. Pt has attended 12 monitored exercise sessions and has progressed to 10.9 METS during peak workloads.  Pt has initiated home exercise program of walking/jogging most days, discussed using home elliptical during colder winter months.  Pt states that his wife works at a gym and has \"plans\" for him to workout with her.  Signs and symptoms of intolerance were discussed and home exercise guidelines were gone over.  Pt stated that he will follow up with Cards on a regular basis or if any symptoms develop.      Pt made significant gains in exercise tolerance. Initially patient tolerated 30 minutes at 3.9METs, now tolerating 30 minutes at peak of 10.9 METs. Patient also increased 6-minute walk test by 9% (an increase of 176 feet.) The PT was given instructions on frequency, intensity, and duration for continued exercise as well as muscle conditioning and stretching exercises.  Your PT plans to continue with home exercise program and consider working out at his wife's gym.   General Information   Treatment Diagnosis Stent   Date of Treatment Diagnosis 07/16/18   Secondary Treatment Diagnosis Stable Angina   Significant Past CV History Previous PCI   Comorbidities None   Other Medical History Hyperlipidemia   Lead up symptoms SOB, chest pain tightness, radiate to jaw and neck; occurred while walking his dog    Hospital [facility]   Hospital Discharge Date 07/17/18   Signs and Symptoms Post Hospital Discharge None   Outpatient Cardiac Rehab Start Date 07/31/18   Primary Physician Huseyin Noland   Primary Physician Follow Up Completed   Cardiologist Dr. HARRISON " "[facility]   Cardiologist Follow Up Advised to schedule appointment   Ejection Fraction 55-60%   Risk Stratification Low   Summary of Cath Report   Summary of Cath Report Available   Date Performed 07/16/18   LAD Two stents in the proximal and mid vessel, there is mild irregularities throughout the vessel and minimal in-stent restenosis throughout the stents. Proximal to the first LAD stent is a stenosis of 25%. two smaller diagonals before a third medium sized diagonal branch that has mild diffuse disease and 20% ostial stenosis   RCA Previously placed proximal stent. 20% stenosis at the ostium. The mid-RCA 95% area of focal stenosis with less than 10% stenosis in the distal vessel   Living and Work Status    Living Arrangements and Social Status house   Support System Live with an adult   Return to Employment Yes   Occupation Supervisor/ Engineering   Preventative Medications   CMS recommended medications Antiplatelets;Ace inhibitors;Beta Blocker;Lipid Lowering   Fall Risk Screen   Fall screen completed by Cardiac Rehab   Have you fallen 2 or more times in the past year? No   Have you fallen and had an injury in the past year? No   Is patient a fall risk? No   Pain   Patient Currently in Pain Unable to assess   Physical Assessments   Incisions WNL   Edema Not assessed   Right Lung Sounds not assessed   Left Lung Sounds not assessed   Limitations No limitations   Individualized Treatment Plan   Monitored Sessions Scheduled 18   Monitored Sessions Attended 12   Oxygen   Supplemental Oxygen needed No   Nutrition Management - Weight Management   Assessment Discharge   Age 43   Weight 112 kg (247 lb)   Height 1.905 m (6' 3\")   BMI (Calculated) 30.94   Initial Rate Your Plate Score. Dietary tool to assess eating patterns. Scores range from 24 to 72. The higher the score the healthier the eating pattern. 62   Discharge Rate Your Plate Score 59   Weight Management Comments 9/24: Pt has lost 4 lbs since his initial " evaluation.  Pt's goal weight is a long term goal. Goal weight for cardiac rehab is to lose 1-2 lbs per week.   Nutrition Management - Lipids   Lipids Labs Available   Date 07/16/18   Total Cholesterol 145   Triglycerides 127   HDL 42   LDL 77   Prescribed Lipid Medication Yes   Statin Intensity High Intensity   Nutrition Management - Diabetes   Diabetes No   Nutrition Management Summary   Dietary Recommendations Low Sodium;Low Fat;Low Cholesterol   Stages of Change for Diet Compliance Action   Interventions Planned Attend Nutrition Education Class(es);Instruct on Label Reading;Educate on Weight Management Principles;Educate on Benefits of Exercise   Interventions In Progress or Completed Attended Nutrition Class(es)   Patient Goals Goal #1;Goal #2   Goal #1 Description Attend both nutrition classes; 1 and 2   Goal #1 Target Date 10/11/18   Goal #1 Progress Towards Goal 9/24: Pt was only able to attend 1 nutrition education class.  8/7/18 Pt planning to attend nutrition class 9/6 pt attended nutrition 1 but was unable to take off work for Nitration 2   Goal #2 Description Pt would like to loose wt. .5-2# per week.   Goal #2 Target Date 10/11/18   Goal #2 Progress Towards Goal 9/24: Pt has lost a total of 4 lbs during CR.  9/13 Pt is down ~3lbs, though his wt does fluctuate significant.    Psychosocial Management   Psychosocial Assessment Discharge   Is there history of clinical depression or increased risk of depression? No previous history   Current Level of Stress per Patient Report Moderate    Current Coping Skills Has Positive Support System;Patient Unable to Identify Personal Coping Skills   Initial Patient Health Questionnaire -9 Score (PHQ-9) for depression. 5-9 Minimal symptoms, 10-14 Minor depression, 15-19 Major depression, moderately severe, > 20 Major depression, severe  1   Discharge PHQ-9 Score for Depression 2   Initial [facility] Survey score.  Quality of Life:   If total score > 25 review  individual areas where patient rated a 4 or 5.  Consider patients current medical condition and what role that plays on the score.   Adjust treatment protocol to improve areas of concern.  Consider the following:  PHQ9 score, DASI, and re-assessment within the next 30 days to assist with developing treatments.  14   Discharge [facility] Survey Score 10   Stages of Change Maintenance   Interventions Planned Patient to verbalize understanding of behavioral assessment results;Patient to verbalize understanding of negative impact of stress to personal health;Patient will recognize signs and symptoms of depression;Patient to attend stress management class(es);Provide resources for stress relaxation   Interventions In Progress or Completed Patient verbalizes understanding of behavioral assessment scores;Patient verbalizes understanding of negative impact of stress to personal health;Patient continues to practice/follow through with strategies to reduce stress and/or anxiety level   Other Core Components - Hypertension   History of or Diagnosis of Hypertension No   Currently taking Anti-Hypertensive's Yes;Beta blocker;Ace Inhibitor   Other Core Components - Tobacco   History of Tobacco Use Yes   Quit Date or Planned Quit Date 02/07/11   Tobacco Use Status Former (Quit > 6 mo ago)   Tobacco Habit Cigarettes   Tobacco Use per Day (average) 15 yrs 1 PPD   Years of Tobacco Use 15   Stages of Change Maintenance   Other Core Components Summary   Interventions Planned Provide information on home blood pressure monitoring;List benefits of weight management;Educate patient regarding action steps for risk factor modification (lifestyle change/medications);Attend education class(es) on Nutrition   Interventions In Progress or Completed Provided information on home blood pressure monitoring   Patient Goals No   Activity/Exercise History   Activity/Exercise Assessment Discharge   Activity/Exercise Status prior to event? Was  "Physically Active   Number of Days Currently participating in Moderate Physical Activity? 5   Number of Days Currently performing  Aerobic Exercise (including rehab)? 5-7   Number of Minutes per Session Currently of Aerobic Exercise (average)? 45   Current Stage of Change (Physical Activity) Action   Current Stage of Change (Aerobic Exercise) Action   Patient Goals Goal #1;Goal #2   Goal #1 Description Tolerate 45 minutes of jogging with his dog, continuous, 3-5 days per week, 4-6 on effort scale.   Goal #1 Target Date 10/11/18   Goal #1 Date Met 09/24/18   Goal #1 Progress Towards Goal 9/24: States he is jogging with dog almost every day for about 30-45 minutes.   Goal #2 Description Establish home exercise prior to discharge   Goal #2 Target Date 10/11/18   Goal #2 Date Met 09/24/18   Goal #2 Progress Towards Goal 9/24: Is walking and jogging with dog and has elliptical at home. His wife does some fitness training and \"has plans\" for him.   Activity/Exercise Target Outcome An Accumulation of 150  Minutes of Aerobic Activity per Week   Exercise Assessment   6 Minute Walk Predicted - Gender Selection Male   6 Minute Walk Predicted (Male) 2393.3   6 Minute Walk Predicted (Female) 1885.58   Initial 6 Minute Walk Distance (Feet) 1932 ft   Discharge 6 Minute Walk Distance (Feet) 2108   Resting HR 53 bpm  (Vitals from 9/13)   Exercise  bpm   Post Exercise HR 58 bpm   Resting /74   Exercise /68   Post Exercise /68   Effort Rating 6   Current MET Level 10.9  (peak)   MET Level Goal 6   ECG Rhythm Sinus rhythm   Ectopy None   Current Symptoms Denies symptoms   Limitations/Restrictions None   Exercise Prescription   Mode Treadmill;Nustep;Recumbent bike;Weights;LE strengthening;Elliptical   Duration/Time 30-45 min   Frequency 3 days week   THR (85% of age predicted max HR) 152.15   OMNI Effort Rating (0-10 Scale) 4-6/10   Progression Continuous bouts;Intermittent bouts;Total exercise time of 30-45 " minutes;Aerobic exercise to OMNI rating of 5-7, and heart rate at or below target;Progress peak intensity by 1/4 MET per week;Progress peak intensity by 1/2 MET per week;Progress per high intensity interval training (HIIT) program   Recommended Home Exercise   Type of Exercise Walking   Frequency (days per week) 2-3   Duration (minutes per session) 30-45 min;Intermittent   Effort Rating Recommended 4-6/10   Current Home Exercise   Type of Exercise Walking;Other (comments)  (jogging)   Frequency (days per week) 5-7   Duration (minutes per session) 30-45   Follow-up/On-going Support   Provider follow-up needed on the following No follow-up needed   Learning Assessment   Learner Patient   Primary Language English   Preferred Learning Style Listening;Reading;Demonstration;Pictures/Video   Barriers to Learning No barriers noted   Patient Education   Education recommended Anatomy and Physiology of the Heart;Blood Pressure;Exercise Principles;Muscle Conditioning;Risk Factors;Stress Management;Weight Loss;Nutrition   Education classes attended Nutrition   Physician co signature/electronic signature indicates approval of this ITP document. I have established, reviewed and made necessary changes to the individualized treatment plan and exercise prescription for this patient.

## 2018-10-16 ENCOUNTER — MYC MEDICAL ADVICE (OUTPATIENT)
Dept: FAMILY MEDICINE | Facility: CLINIC | Age: 43
End: 2018-10-16

## 2018-10-16 DIAGNOSIS — I25.10 CAD S/P PERCUTANEOUS CORONARY ANGIOPLASTY: ICD-10-CM

## 2018-10-16 DIAGNOSIS — Z98.61 CAD S/P PERCUTANEOUS CORONARY ANGIOPLASTY: ICD-10-CM

## 2018-10-30 NOTE — TELEPHONE ENCOUNTER
Patient has read MyChart sent by RN below, so will close encounter at this time.    Lisa Wang RN

## 2018-10-31 ENCOUNTER — TELEPHONE (OUTPATIENT)
Dept: CARDIOLOGY | Facility: CLINIC | Age: 43
End: 2018-10-31

## 2018-10-31 DIAGNOSIS — Z98.61 CAD S/P PERCUTANEOUS CORONARY ANGIOPLASTY: ICD-10-CM

## 2018-10-31 DIAGNOSIS — I25.10 CAD S/P PERCUTANEOUS CORONARY ANGIOPLASTY: ICD-10-CM

## 2018-10-31 NOTE — TELEPHONE ENCOUNTER
Reason for Call:  Appointment needed for refilling of medication    Detailed comments: Please call to schedule appointment for medication refills    Phone Number Patient can be reached at: Home number on file [phone] (home)    Best Time: any    Can we leave a detailed message on this number? Not Applicable    Call taken on 10/31/2018 at 8:37 AM by Traci Feldman

## 2018-10-31 NOTE — TELEPHONE ENCOUNTER
Writer reviewed chart.  Recent RCA PCI in July 2018.  Spoke with patient and discussed that he is due for his 1 month follow up status post angiogram.  Also reviewed the importance of continuing Brinlinta.  Offered patient an appt with Dr. Colbert on 11/15 at 8:00 am and patient accepted.  Rx refilled per refill protocol.    Alejandra Sinclair, RN  Care Coordinator  [facility]  [phone]    Signed Prescriptions:                        Disp   Refills    ticagrelor (BRILINTA) 90 MG tablet         180 ta*1        Sig: Take 1 tablet (90 mg) by mouth 2 times daily  Authorizing Provider: ARIANE SANDOVAL  Ordering User: ALEJANDRA SINCLAIR

## 2018-11-15 ENCOUNTER — OFFICE VISIT (OUTPATIENT)
Dept: CARDIOLOGY | Facility: CLINIC | Age: 43
End: 2018-11-15
Payer: COMMERCIAL

## 2018-11-15 VITALS
SYSTOLIC BLOOD PRESSURE: 105 MMHG | OXYGEN SATURATION: 97 % | HEART RATE: 62 BPM | DIASTOLIC BLOOD PRESSURE: 70 MMHG | WEIGHT: 247 LBS | BODY MASS INDEX: 30.87 KG/M2

## 2018-11-15 DIAGNOSIS — Z98.61 S/P PTCA (PERCUTANEOUS TRANSLUMINAL CORONARY ANGIOPLASTY): Primary | ICD-10-CM

## 2018-11-15 PROCEDURE — 99214 OFFICE O/P EST MOD 30 MIN: CPT | Performed by: INTERNAL MEDICINE

## 2018-11-15 NOTE — MR AVS SNAPSHOT
After Visit Summary   11/15/2018    Travon Livingston    MRN: [id number]           Patient Information     Date Of Birth          1975        Visit Information        Provider Department      11/15/2018 8:00 AM ARIANE Hanna MD [facility] HEART [facility]        Care Instructions    Thank you for coming to the [facility] Heart @ [facility]; please note the following instructions:    1. Dr. NESTOR Colbert has requested you to follow up in 1 year; the cardiology team will call you when the time gets closer.            If you have any questions regarding your visit please contact your care team:     Cardiology  Telephone Number   Ade KRISHNAMURTHY, RN  Gia PARHAM, RN   Jossie OBREGON, EDUARDO TREVIZO MA   [phone]    *After hours: [phone]   For scheduling appts:     [phone] or    [phone] (select option 1)    *After hours: [phone]     For the Device Clinic (Pacemakers and ICD's)  RN's :  Susie Kline   During business hours: [phone]    *After business hours:  [phone] (select option 4)      Normal test result notifications will be released via Thingieshart or mailed within 7 business days.  All other test results, will be communicated via telephone once reviewed by your cardiologist.    If you need a medication refill please contact your pharmacy.  Please allow 3 business days for your refill to be completed.    As always, thank you for trusting us with your health care needs!            Follow-ups after your visit        Who to contact     If you have questions or need follow up information about today's clinic visit or your schedule please contact [facility] directly at [phone].  Normal or non-critical lab and imaging results will be communicated to you by MyChart, letter or phone within 4 business days after the clinic has received the results. If you do not hear from us within 7  days, please contact the clinic through [facility] or phone. If you have a critical or abnormal lab result, we will notify you by phone as soon as possible.  Submit refill requests through [facility] or call your pharmacy and they will forward the refill request to us. Please allow 3 business days for your refill to be completed.          Additional Information About Your Visit        Exotelhart Information     [facility] gives you secure access to your electronic health record. If you see a primary care provider, you can also send messages to your care team and make appointments. If you have questions, please call your primary care clinic.  If you do not have a primary care provider, please call [phone] and they will assist you.        Care EveryWhere ID     This is your Care EveryWhere ID. This could be used by other organizations to access your [facility] medical records  [id number]        Your Vitals Were     Pulse Pulse Oximetry BMI (Body Mass Index)             62 97% 30.87 kg/m2          Blood Pressure from Last 3 Encounters:   11/15/18 105/70   07/17/18 125/81   02/15/18 118/72    Weight from Last 3 Encounters:   11/15/18 112 kg (247 lb)   09/24/18 112 kg (247 lb)   09/14/18 112 kg (247 lb)              Today, you had the following     No orders found for display       Primary Care Provider Office Phone # Fax #    Huseyin Noland PA-C [phone]       [address] [facility] [address]        Equal Access to Services     CONY DALLAS : Hadii preston ku hadasho Sopiaali, waaxda luqadaha, qaybta kaalmada adeegyada, alejandro mcbride. So [facility] [phone].    ATENCIÓN: Si habla español, tiene a krishnamurthy disposición servicios gratuitos de asistencia lingüística. Llnola al [phone].    We comply with applicable federal civil rights laws and [place] laws. We do not discriminate on the basis of race, color, national origin, age, disability, sex, sexual orientation, or gender  identity.            Thank you!     Thank you for choosing [facility] AT [facility]  for your care. Our goal is always to provide you with excellent care. Hearing back from our patients is one way we can continue to improve our services. Please take a few minutes to complete the written survey that you may receive in the mail after your visit with us. Thank you!             Your Updated Medication List - Protect others around you: Learn how to safely use, store and throw away your medicines at www.disposemymeds.org.          This list is accurate as of 11/15/18  8:21 AM.  Always use your most recent med list.                   Brand Name Dispense Instructions for use Diagnosis    aspirin 81 MG EC tablet      Take 1 tablet by mouth daily.    CAD (coronary artery disease)       lisinopril 10 MG tablet    PRINIVIL/ZESTRIL    90 tablet    Take 1 tablet (10 mg) by mouth daily    Coronary artery disease without angina pectoris, unspecified vessel or lesion type, unspecified whether native or transplanted heart, S/P angioplasty with stent       metoprolol tartrate 25 MG tablet    LOPRESSOR    135 tablet    Take 0.5 tablets (12.5 mg) by mouth 2 times daily    S/P angioplasty with stent, Coronary artery disease without angina pectoris, unspecified vessel or lesion type, unspecified whether native or transplanted heart       nitroGLYcerin 0.4 MG sublingual tablet    NITROSTAT    25 tablet    Place 1 tablet (0.4 mg) under the tongue every 5 minutes as needed for chest pain Can repeat x3, then call EMS    Coronary artery disease without angina pectoris, unspecified vessel or lesion type, unspecified whether native or transplanted heart, S/P angioplasty with stent       rosuvastatin 40 MG tablet    CRESTOR    90 tablet    Take 1 tablet (40 mg) by mouth daily    S/P angioplasty with stent, Coronary artery disease without angina pectoris, unspecified vessel or lesion type, unspecified whether  native or transplanted heart       ticagrelor 90 MG tablet    BRILINTA    180 tablet    Take 1 tablet (90 mg) by mouth 2 times daily    CAD S/P percutaneous coronary angioplasty

## 2018-11-15 NOTE — PROGRESS NOTES
SUBJECTIVE:  Travon Livingston is a 43 year old male who presents for follow up.     His medical history is notable for CAD s/p JORGE (Promus Element) to mLAD and dLAD as well as POBA to D2 in January 2013 followed by staged JORGE to pRCA; HLD; former tobacco use quitting in 2011; and strong family history of early CAD with father MI at age 40 years.   This July had mild chest discomfort  while walking the dog and had an angiogram which did not show flow limiting lesions and showed patent stents.  Currently asymptomatic.    Patient Active Problem List    Diagnosis Date Noted     Chest pain 07/16/2018     Priority: Medium     Epigastric pain 07/20/2015     Priority: Medium     S/P angioplasty with stent 01/24/2013     Priority: Medium     3 stents placed        Hyperlipidemia LDL goal <70 01/24/2013     Priority: Medium     CAD S/P percutaneous coronary angioplasty 01/24/2013     Priority: Medium    .  Current Outpatient Prescriptions   Medication Sig     aspirin EC 81 MG EC tablet Take 1 tablet by mouth daily.     lisinopril (PRINIVIL/ZESTRIL) 10 MG tablet Take 1 tablet (10 mg) by mouth daily     metoprolol tartrate (LOPRESSOR) 25 MG tablet Take 0.5 tablets (12.5 mg) by mouth 2 times daily     rosuvastatin (CRESTOR) 40 MG tablet Take 1 tablet (40 mg) by mouth daily     ticagrelor (BRILINTA) 90 MG tablet Take 1 tablet (90 mg) by mouth 2 times daily     nitroGLYcerin (NITROSTAT) 0.4 MG sublingual tablet Place 1 tablet (0.4 mg) under the tongue every 5 minutes as needed for chest pain Can repeat x3, then call EMS     No current facility-administered medications for this visit.      Past Medical History:   Diagnosis Date     High cholesterol      Myocardial infarction (H)      Past Surgical History:   Procedure Laterality Date     coronary stints[  1/24/2013     No Known Allergies  Social History     Social History     Marital status:      Spouse name: N/A     Number of children: N/A     Years of education: N/A      Occupational History     Not on file.     Social History Main Topics     Smoking status: Former Smoker     Packs/day: 1.00     Years: 15.00     Types: Cigarettes     Quit date: 8/23/2011     Smokeless tobacco: Never Used     Alcohol use Yes      Comment: daily, 1 to 2 beers      Drug use: No     Sexual activity: Yes     Partners: Female     Other Topics Concern     Parent/Sibling W/ Cabg, Mi Or Angioplasty Before 65f 55m? Yes     Social History Narrative     Family History   Problem Relation Age of Onset     C.A.D. Father           REVIEW OF SYSTEMS:  General: negative, fever, chills, night sweats  Skin: negative, acne, rash and scaling  Eyes: negative, double vision, eye pain and photophobia  Ears/Nose/Throat: negative, nasal congestion and purulent rhinorrhea  Respiratory: No dyspnea on exertion, No cough, No hemoptysis and negative  Cardiovascular: negative, palpitations, tachycardia, irregular heart beat, chest pain, exertional chest pain or pressure, paroxysmal nocturnal dyspnea, dyspnea on exertion and orthopnea         OBJECTIVE:  Blood pressure 105/70, pulse 62, weight 112 kg (247 lb), SpO2 97 %.  General Appearance: healthy, alert, active and no distress  Head: Normocephalic. No masses, lesions, tenderness or abnormalities  Eyes: conjuctiva clear, PERRL, EOM intact  Ears: External ears normal. Canals clear. TM's normal.  Nose: Nares normal  Mouth: normal  Neck: Supple, no cervical adenopathy, no thyromegaly  Lungs: clear to auscultation  Cardiac: regular rate and rhythm, normal S1 and S2, no murmur         ASSESSMENT/PLAN:  Known CAD as above.  Prior m,d LAD stent and pRCA stent.  Repeat angiogram this July-patent stents. No flow limiting lesions.  Currently active and asymptomatic.  Reviewed EKG. NSR. Normal.  Echo reviewed. Normal with no RWMA.  Patient on Brilinta since last stent.   As no problems with this drug, will continue.  Have the option of stopping it.  Per orders.   Return to Clinic 1yr.

## 2018-11-15 NOTE — PATIENT INSTRUCTIONS
Thank you for coming to the [facility] Heart @ [place]; please note the following instructions:    1. Dr. NESTOR Colbert has requested you to follow up in 1 year; the cardiology team will call you when the time gets closer.            If you have any questions regarding your visit please contact your care team:     Cardiology  Telephone Number   Ade KRISHNAMURTHY, SAMANTHA PARHAM, RN   Jossie OBREGON, EDUARDO TREVIZO MA   [phone]    *After hours: [phone]   For scheduling appts:     [phone] or    [phone] (select option 1)    *After hours: [phone]     For the Device Clinic (Pacemakers and ICD's)  RN's :  Susie Kline   During business hours: [phone]    *After business hours:  [phone] (select option 4)      Normal test result notifications will be released via Passlogix or mailed within 7 business days.  All other test results, will be communicated via telephone once reviewed by your cardiologist.    If you need a medication refill please contact your pharmacy.  Please allow 3 business days for your refill to be completed.    As always, thank you for trusting us with your health care needs!

## 2018-11-15 NOTE — LETTER
11/15/2018      RE: Travon Livingston  [address]  [place] [address]       Dear Colleague,    Thank you for the opportunity to participate in the care of your patient, Travon Livingston, at the [facility] HEART AT [facility]. Please see a copy of my visit note below.       SUBJECTIVE:  Travon Livingston is a 43 year old male who presents for follow up.     His medical history is notable for CAD s/p JORGE (Promus Element) to mLAD and dLAD as well as POBA to D2 in January 2013 followed by staged JORGE to pRCA; HLD; former tobacco use quitting in 2011; and strong family history of early CAD with father MI at age 40 years.   This July had mild chest discomfort  while walking the dog and had an angiogram which did not show flow limiting lesions and showed patent stents.  Currently asymptomatic.    Patient Active Problem List    Diagnosis Date Noted     Chest pain 07/16/2018     Priority: Medium     Epigastric pain 07/20/2015     Priority: Medium     S/P angioplasty with stent 01/24/2013     Priority: Medium     3 stents placed        Hyperlipidemia LDL goal <70 01/24/2013     Priority: Medium     CAD S/P percutaneous coronary angioplasty 01/24/2013     Priority: Medium    .  Current Outpatient Prescriptions   Medication Sig     aspirin EC 81 MG EC tablet Take 1 tablet by mouth daily.     lisinopril (PRINIVIL/ZESTRIL) 10 MG tablet Take 1 tablet (10 mg) by mouth daily     metoprolol tartrate (LOPRESSOR) 25 MG tablet Take 0.5 tablets (12.5 mg) by mouth 2 times daily     rosuvastatin (CRESTOR) 40 MG tablet Take 1 tablet (40 mg) by mouth daily     ticagrelor (BRILINTA) 90 MG tablet Take 1 tablet (90 mg) by mouth 2 times daily     nitroGLYcerin (NITROSTAT) 0.4 MG sublingual tablet Place 1 tablet (0.4 mg) under the tongue every 5 minutes as needed for chest pain Can repeat x3, then call EMS     No current facility-administered medications for this visit.       Past Medical History:   Diagnosis Date     High cholesterol      Myocardial infarction (H)      Past Surgical History:   Procedure Laterality Date     coronary stints[  1/24/2013     No Known Allergies  Social History     Social History     Marital status:      Spouse name: N/A     Number of children: N/A     Years of education: N/A     Occupational History     Not on file.     Social History Main Topics     Smoking status: Former Smoker     Packs/day: 1.00     Years: 15.00     Types: Cigarettes     Quit date: 8/23/2011     Smokeless tobacco: Never Used     Alcohol use Yes      Comment: daily, 1 to 2 beers      Drug use: No     Sexual activity: Yes     Partners: Female     Other Topics Concern     Parent/Sibling W/ Cabg, Mi Or Angioplasty Before 65f 55m? Yes     Social History Narrative     Family History   Problem Relation Age of Onset     C.A.D. Father           REVIEW OF SYSTEMS:  General: negative, fever, chills, night sweats  Skin: negative, acne, rash and scaling  Eyes: negative, double vision, eye pain and photophobia  Ears/Nose/Throat: negative, nasal congestion and purulent rhinorrhea  Respiratory: No dyspnea on exertion, No cough, No hemoptysis and negative  Cardiovascular: negative, palpitations, tachycardia, irregular heart beat, chest pain, exertional chest pain or pressure, paroxysmal nocturnal dyspnea, dyspnea on exertion and orthopnea         OBJECTIVE:  Blood pressure 105/70, pulse 62, weight 112 kg (247 lb), SpO2 97 %.  General Appearance: healthy, alert, active and no distress  Head: Normocephalic. No masses, lesions, tenderness or abnormalities  Eyes: conjuctiva clear, PERRL, EOM intact  Ears: External ears normal. Canals clear. TM's normal.  Nose: Nares normal  Mouth: normal  Neck: Supple, no cervical adenopathy, no thyromegaly  Lungs: clear to auscultation  Cardiac: regular rate and rhythm, normal S1 and S2, no murmur         ASSESSMENT/PLAN:  Known CAD as above.  Prior m,d LAD stent  and pRCA stent.  Repeat angiogram this July-patent stents. No flow limiting lesions.  Currently active and asymptomatic.  Reviewed EKG. NSR. Normal.  Echo reviewed. Normal with no RWMA.  Patient on Brilinta since last stent.   As no problems with this drug, will continue.  Have the option of stopping it.  Per orders.   Return to Clinic 1yr.    Please do not hesitate to contact me if you have any questions/concerns.     Sincerely,     ARIANE Hanna MD

## 2018-11-15 NOTE — NURSING NOTE
"Chief Complaint   Patient presents with     RECHECK     s/p PCI 7/2018. Pt reports no cardiac issues.       Initial /70 (BP Location: Right arm, Patient Position: Chair, Cuff Size: Adult Large)  Pulse 62  Wt 112 kg (247 lb)  SpO2 97%  BMI 30.87 kg/m2 Estimated body mass index is 30.87 kg/(m^2) as calculated from the following:    Height as of 9/24/18: 1.905 m (6' 3\").    Weight as of this encounter: 112 kg (247 lb)..  BP completed using cuff size: angelina Wells MA    "
Med Reconcile: Reviewed and verified all current medications with the patient. The updated medication list was printed and given to the patient.  Return Appointment: Patient given instructions regarding scheduling next clinic visit. Patient demonstrated understanding of this information and agreed to call with further questions or concerns.  1 year follow up  Patient stated he understood all health information given and agreed to call with further questions or concerns.  Ade Leyva RN    
PST

## 2019-02-17 DIAGNOSIS — I25.10 CORONARY ARTERY DISEASE WITHOUT ANGINA PECTORIS, UNSPECIFIED VESSEL OR LESION TYPE, UNSPECIFIED WHETHER NATIVE OR TRANSPLANTED HEART: ICD-10-CM

## 2019-02-17 DIAGNOSIS — Z95.820 S/P ANGIOPLASTY WITH STENT: ICD-10-CM

## 2019-02-18 RX ORDER — LISINOPRIL 10 MG/1
10 TABLET ORAL DAILY
Qty: 90 TABLET | Refills: 0 | Status: SHIPPED | OUTPATIENT
Start: 2019-02-18 | End: 2019-05-07

## 2019-02-18 NOTE — TELEPHONE ENCOUNTER
"Requested Prescriptions   Pending Prescriptions Disp Refills     lisinopril (PRINIVIL/ZESTRIL) 10 MG tablet [Pharmacy Med Name: LISINOPRIL 10 MG TABLET]  Last Written Prescription Date:  2/15/18  Last Fill Quantity: 90,  # refills: 3   Last office visit: 2/15/2018 with prescribing provider:  Ludin   Future Office Visit:     90 tablet 3     Sig: TAKE 1 TABLET BY MOUTH DAILY    ACE Inhibitors (Including Combos) Protocol Failed - 2/17/2019  8:10 AM       Failed - Recent (12 mo) or future (30 days) visit within the authorizing provider's specialty    Patient had office visit in the last 12 months or has a visit in the next 30 days with authorizing provider or within the authorizing provider's specialty.  See \"Patient Info\" tab in inbasket, or \"Choose Columns\" in Meds & Orders section of the refill encounter.             Passed - Blood pressure under 140/90 in past 12 months    BP Readings from Last 3 Encounters:   11/15/18 105/70   07/17/18 125/81   02/15/18 118/72                Passed - Medication is active on med list       Passed - Patient is age 18 or older       Passed - Normal serum creatinine on file in past 12 months    Recent Labs   Lab Test 07/17/18  0654   CR 0.91            Passed - Normal serum potassium on file in past 12 months    Recent Labs   Lab Test 07/17/18  0654   POTASSIUM 4.0               "

## 2019-02-18 NOTE — TELEPHONE ENCOUNTER
Routing refill request to provider for review/approval because:  Patient needs to be seen because it has been more than 1 year since last office visit.    Lucila Salcido RN

## 2019-02-26 ENCOUNTER — OFFICE VISIT (OUTPATIENT)
Dept: FAMILY MEDICINE | Facility: CLINIC | Age: 44
End: 2019-02-26
Payer: COMMERCIAL

## 2019-02-26 VITALS
TEMPERATURE: 97.7 F | DIASTOLIC BLOOD PRESSURE: 86 MMHG | HEART RATE: 72 BPM | HEIGHT: 75 IN | BODY MASS INDEX: 31.08 KG/M2 | SYSTOLIC BLOOD PRESSURE: 136 MMHG | WEIGHT: 250 LBS

## 2019-02-26 DIAGNOSIS — M62.830 BACK MUSCLE SPASM: Primary | ICD-10-CM

## 2019-02-26 PROCEDURE — 99213 OFFICE O/P EST LOW 20 MIN: CPT | Performed by: FAMILY MEDICINE

## 2019-02-26 RX ORDER — CYCLOBENZAPRINE HCL 10 MG
10 TABLET ORAL 3 TIMES DAILY PRN
Qty: 42 TABLET | Refills: 0 | Status: SHIPPED | OUTPATIENT
Start: 2019-02-26 | End: 2019-05-07

## 2019-02-26 ASSESSMENT — PAIN SCALES - GENERAL: PAINLEVEL: MODERATE PAIN (4)

## 2019-02-26 ASSESSMENT — MIFFLIN-ST. JEOR: SCORE: 2124.62

## 2019-02-26 NOTE — PROGRESS NOTES
SUBJECTIVE:   Travon Livingston is a 43 year old male who presents to clinic today for the following health issues:    Back Pain       Duration: 2 weeks        Specific cause: lifting- at the Gym    Description:   Location of pain: low back bilateral  Character of pain: sharp, dull ache and stabbing  Pain radiation:none  New numbness or weakness in legs, not attributed to pain:  no     Intensity: mild, moderate, severe    History:   Pain interferes with job: YES  History of back problems: recurrent self limited episodes of low back pain in the past- maybe 10 years ago? Injury 20 years ago in the   Any previous MRI or X-rays: Yes--over 10 years ago  Sees a specialist for back pain:  No  Therapies tried without relief: acetaminophen (Tylenol) and massage    Alleviating factors:   Improved by: None      Precipitating factors:  Worsened by: Bending, Standing and Sitting          Accompanying Signs & Symptoms:  Risk of Fracture:  None  Risk of Cauda Equina:  None  Risk of Infection:  None  Risk of Cancer:  None  Risk of Ankylosing Spondylitis:  Onset at age <35, male, AND morning back stiffness. no       Problem list and histories reviewed & adjusted, as indicated.  Additional history: as documented    Patient Active Problem List   Diagnosis     S/P angioplasty with stent     Hyperlipidemia LDL goal <70     CAD S/P percutaneous coronary angioplasty     Epigastric pain     Chest pain     Past Surgical History:   Procedure Laterality Date     coronary stints[  2013       Social History     Tobacco Use     Smoking status: Former Smoker     Packs/day: 1.00     Years: 15.00     Pack years: 15.00     Types: Cigarettes     Last attempt to quit: 2011     Years since quittin.5     Smokeless tobacco: Never Used   Substance Use Topics     Alcohol use: Yes     Comment: daily, 1 to 2 beers      Family History   Problem Relation Age of Onset     C.A.D. Father            Reviewed and updated as needed this visit by  "clinical staff  Tobacco  Allergies  Meds  Med Hx  Surg Hx  Fam Hx  Soc Hx      Reviewed and updated as needed this visit by Provider         ROS:  Constitutional, HEENT, cardiovascular, pulmonary, gi and gu systems are negative, except as otherwise noted.    OBJECTIVE:     /86 (BP Location: Right arm, Patient Position: Sitting, Cuff Size: Adult Large)   Pulse 72   Temp 97.7  F (36.5  C) (Oral)   Ht 1.905 m (6' 3\")   Wt 113.4 kg (250 lb)   BMI 31.25 kg/m    Body mass index is 31.25 kg/m .  GENERAL: healthy, alert and no distress  Comprehensive back pain exam:  Tenderness of bilateral lumbar paraspinals, Pain limits the following motions: flexion, left sidebending, Lower extremity strength functional and equal on both sides, Lower extremity reflexes within normal limits bilaterally and Straight leg raise negative bilaterally    ASSESSMENT/PLAN:     1. Back muscle spasm  - Advised Ibuprofen (no more than 800mg TID) and Tylenol (no more than 1g TID)  - Flexeril PRN  - Offered formal physical therapy, but he refuses for now.  Patient was given a handout with home exercises to do BID-TID  - cyclobenzaprine (FLEXERIL) 10 MG tablet; Take 1 tablet (10 mg) by mouth 3 times daily as needed for muscle spasms  Dispense: 42 tablet; Refill: 0    Follow up in 2-4 weeks if symptoms persist    Nadja Suggs, DO  [facility]    "

## 2019-02-26 NOTE — PATIENT INSTRUCTIONS
Patient Education     Back Spasm (No Trauma)    Spasm of the back muscles can occur after a sudden forceful twisting or bending force (such as in a car accident), after a simple awkward movement, or after lifting something heavy with poor body positioning. In any case, muscle spasm adds to the pain. Sleeping in an awkward position or on a poor quality mattress can also cause this. Some people respond to emotional stress by tensing the muscles of their back.  Pain that continues may need further evaluation or other types of treatment such as physical therapy.  You don't always need X-rays for the initial evaluation of back pain, unless you had a physical injury such as from a car accident or fall. If your pain continues and doesn't respond to medical treatment, X-rays and other tests may then be done.   Home care    As soon as possible, start sitting or walking again to avoid problems from prolonged bed rest (muscle weakness, worsening back stiffness and pain, blood clots in the legs).    When in bed, try to find a position of comfort. A firm mattress is best. Try lying flat on your back with pillows under your knees. You can also try lying on your side with your knees bent up toward your chest and a pillow between your knees.    Avoid prolonged sitting, long car rides, or travel. This puts more stress on the lower back than standing or walking.     During the first 24 to 72 hours after an injury or flare-up, apply an ice pack to the painful area for 20 minutes, then remove it for 20 minutes. Do this over a period of 60 to 90 minutes or several times a day. This will reduce swelling and pain. Always wrap ice packs in a thin towel.    You can start with ice, then switch to heat. Heat (hot shower, hot bath, or heating pad) reduces pain, and works well for muscle spasms. Apply heat to the painful area for 20 minutes, then remove it for 20 minutes. Do this over a period of 60 to 90 minutes or several times a day. Do  not sleep on a heating pad as it can burn or damage skin.    Alternate ice and heat therapies.    Be aware of safe lifting methods and do not lift anything over 15 pounds until all the pain is gone.  Gentle stretching will help your back heal faster. Do this simple routine 2 to 3 times a day until your back is feeling better.    Lie on your back with your knees bent and both feet on the ground    Slowly raise your left knee to your chest as you flatten your lower back against the floor. Hold for 20 to 30 seconds.    Relax and repeat the exercise with your right knee.    Do 2 to 3 of these exercises for each leg.    Repeat, hugging both knees to your chest at the same time.    Do not bounce, but use a gentle pull.  Medicines  Talk to your doctor before using medicine, especially if you have other medical problems or are taking other medicines.  You may use acetaminophen or ibuprofen to control pain, unless your healthcare provider prescribed another pain medicine. If you have a chronic condition such as diabetes, liver or kidney disease, stomach ulcer, or gastrointestinal bleeding, or are taking blood thinners, talk with your healthcare provider before taking any medicines.  Be careful if you are given prescription pain medicine, narcotics, or medicine for muscle spasm. They can cause drowsiness, affect your coordination, reflexes, or judgment. Do not drive or operate heavy machinery when taking these medicines. Take pain medicine only as prescribed by your healthcare provider.  Follow-up care  Follow up with your doctor, or as advised. Physical therapy or further tests may be needed.  If X-rays were taken, they may be reviewed by a radiologist. You will be notified of any new findings that may affect your care.  Call 911  Call 911 if any of these occur:    Trouble breathing    Confusion    Drowsiness or trouble awakening    Fainting or loss of consciousness    Rapid or very slow heart rate    Loss of bowel or  bladder control  When to seek medical advice  Call your healthcare provider right away if any of these occur:    Pain becomes worse or spreads to your legs    Weakness or numbness in one or both legs    Numbness in the groin or genital area    Fever of 100.4 F (38 C) or higher, or as directed by your healthcare provider    Burning or pain when passing urine  Date Last Reviewed: 6/1/2016 2000-2018 [employer]. [address] [place] [address]. All rights reserved. This information is not intended as a substitute for professional medical care. Always follow your healthcare professional's instructions.         [facility]       If you have any questions regarding your visit please contact your care team:     Team Gold                [facility] Hours Telephone Number     Dr. Pipe Trinh, [facility]   7am-7pm  Monday - Thursday   7am-5pm  Fridays  [phone]   (Appointment scheduling available 24/7)     RN Line  [phone] option 2     Urgent Care - [place] and [place] - 11am-9pm Monday-Friday Saturday-Sunday- 9am-5pm     [place] -   5pm-9pm Monday-Friday Saturday-Sunday- 9am-5pm    [phone] - [place]    [phone] - [place]     For a Price Quote for your services, please call our Consumer Price Line at [phone].     What options do I have for visits at the clinic other than the traditional office visit?     To expand how we care for you, many of our providers are utilizing electronic visits (e-visits) and telephone visits, when medically appropriate, for interactions with their patients rather than a visit in the clinic. We also offer nurse visits for many medical concerns. Just like any other service, we will bill your insurance company for this type of visit based on time spent on the phone with your provider. Not all insurance companies cover these visits. Please check with your medical insurance if  this type of visit is covered. You will be responsible for any charges that are not paid by your insurance.     E-visits via [employer]: generally incur a $45.00 fee.     Telephone visits:  Time spent on the phone: *charged based on time that is spent on the phone in increments of 10 minutes. Estimated cost:   5-10 mins $30.00   11-20 mins. $59.00   21-30 mins. $85.00       Use [employer] (secure email communication and access to your chart) to send your primary care provider a message or make an appointment. Ask someone on your Team how to sign up for [employer].     As always, Thank you for trusting us with your health care needs!      [facility]:    Scheduling Appointments  [place] [place]  Call: [phone]    [facility]  Call: [phone]    [facility]  Call: [phone]    For Gastroenterology referrals   [facility]   Clinics and Surgery Center, 4th Floor   [address] [place] [id number]   Appointments: [phone]    WHERE TO GO FOR CARE?    Clinic    Make an appointment if you:       Are sick (cold, cough, flu, sore throat, earache or in pain).       Have a small injury (sprain, small cut, burn or broken bone).       Need a physical exam, Pap smear, vaccine or prescription refill.       Have questions about your health or medicines.    To reach us:      Call [phone] ([phone]). Open 24 hours every day. (For counseling services, call [phone].)    Log into [employer] at [email or web address]. (Visit [email or web address] to create an account.) Hospital emergency room    An emergency is a serious or life- threatening problem that must be treated right away.    Call [phone] or get to the hospital if you have:      Very bad or sudden:            - Chest pain or pressure         - Bleeding         - Head or belly pain         - Dizziness or trouble seeing, walking or                           Speaking      Problems breathing      Blood in your vomit or you are coughing up blood      A major injury (knocked out, loss of a finger or limb, rape, broken bone protruding from skin)    A mental health crisis. (Or call the Mental Health Crisis line at 1-842.800.6108 or Suicide Prevention Hotline at 1-212.895.7687.)    Open 24 hours every day. You don't need an appointment.     Urgent care    Visit urgent care for sickness or small injuries when the clinic is closed. You don't need an appointment. To check hours or find an urgent care near you, visit [email or web address]. Online care    Get online care from [facility] for more than 70 common problems, like colds, allergies and infections. Open 24 hours every day at:   [email or web address]   Need help deciding?    For advice about where to be seen, you may call your clinic and ask to speak with a nurse. We're here for you 24 hours every day.         If you are deaf or hard of hearing, please let us know. We provide many free services including sign language interpreters, oral interpreters, TTYs, telephone amplifiers, note takers and written materials.

## 2019-04-10 ENCOUNTER — TELEPHONE (OUTPATIENT)
Dept: FAMILY MEDICINE | Facility: CLINIC | Age: 44
End: 2019-04-10

## 2019-04-10 DIAGNOSIS — I25.10 CORONARY ARTERY DISEASE WITHOUT ANGINA PECTORIS, UNSPECIFIED VESSEL OR LESION TYPE, UNSPECIFIED WHETHER NATIVE OR TRANSPLANTED HEART: ICD-10-CM

## 2019-04-10 DIAGNOSIS — Z95.820 S/P ANGIOPLASTY WITH STENT: ICD-10-CM

## 2019-04-10 NOTE — TELEPHONE ENCOUNTER
"Requested Prescriptions   Pending Prescriptions Disp Refills     rosuvastatin (CRESTOR) 40 MG tablet [Pharmacy Med Name:  Last Written Prescription Date:  2/15/2018  Last Fill Quantity: 90 tabs,  # refills: 3   Last office visit: 2/26/2019 with prescribing provider:  Ludin   Future Office Visit:     ROSUVASTATIN CALCIUM 40 MG TAB] 90 tablet 3     Sig: TAKE 1 TABLET BY MOUTH DAILY       Statins Protocol Passed - 4/10/2019  8:58 AM        Passed - LDL on file in past 12 months     Recent Labs   Lab Test 07/16/18  2133   LDL 77             Passed - No abnormal creatine kinase in past 12 months     No lab results found.             Passed - Recent (12 mo) or future (30 days) visit within the authorizing provider's specialty     Patient had office visit in the last 12 months or has a visit in the next 30 days with authorizing provider or within the authorizing provider's specialty.  See \"Patient Info\" tab in inbasket, or \"Choose Columns\" in Meds & Orders section of the refill encounter.              Passed - Medication is active on med list        Passed - Patient is age 18 or older          "

## 2019-04-11 RX ORDER — ROSUVASTATIN CALCIUM 40 MG/1
TABLET, COATED ORAL
Qty: 30 TABLET | Refills: 0 | Status: SHIPPED | OUTPATIENT
Start: 2019-04-11 | End: 2019-05-07

## 2019-04-11 NOTE — TELEPHONE ENCOUNTER
Nicole given x1 today. Patient needs office visit, please schedule. Patient is due for annual visit with PCP.     Thanks!  Scar Issa RN

## 2019-04-12 NOTE — TELEPHONE ENCOUNTER
LMOM for patient to call back to schedule an annual physical prior to next refill.    Thanks!  Scar Dallas

## 2019-04-15 ENCOUNTER — MYC MEDICAL ADVICE (OUTPATIENT)
Dept: CARDIOLOGY | Facility: CLINIC | Age: 44
End: 2019-04-15

## 2019-04-17 NOTE — TELEPHONE ENCOUNTER
No response from pt yesterday. Did read my chart message. No appt schd for tomorrow, no ED visit noted.     2nd call and my chart msg sent. Dr Colbert has a couple openings in clinic tomorrow

## 2019-04-18 ENCOUNTER — OFFICE VISIT (OUTPATIENT)
Dept: CARDIOLOGY | Facility: CLINIC | Age: 44
End: 2019-04-18
Payer: COMMERCIAL

## 2019-04-18 VITALS
HEART RATE: 73 BPM | BODY MASS INDEX: 30.62 KG/M2 | WEIGHT: 245 LBS | OXYGEN SATURATION: 97 % | SYSTOLIC BLOOD PRESSURE: 122 MMHG | DIASTOLIC BLOOD PRESSURE: 77 MMHG

## 2019-04-18 DIAGNOSIS — R07.89 ATYPICAL CHEST PAIN: Primary | ICD-10-CM

## 2019-04-18 DIAGNOSIS — Z98.61 S/P PTCA (PERCUTANEOUS TRANSLUMINAL CORONARY ANGIOPLASTY): ICD-10-CM

## 2019-04-18 PROCEDURE — 99214 OFFICE O/P EST MOD 30 MIN: CPT | Performed by: INTERNAL MEDICINE

## 2019-04-18 NOTE — NURSING NOTE
Med Reconcile: Reviewed and verified all current medications with the patient. The updated medication list was printed and given to the patient.  Return Appointment: Patient given instructions regarding scheduling next clinic visit. Patient demonstrated understanding of this information and agreed to call with further questions or concerns.  1 year.  If symptoms continue to call clinic to set up a stress test.  Patient stated he understood all health information given and agreed to call with further questions or concerns.  Ade Leyva RN

## 2019-04-18 NOTE — PATIENT INSTRUCTIONS
Thank you for coming to the [facility]; please note the following instructions:    1. Follow up in 1 year with Dr. Colbert-we will call you when the time gets closer.          If you have any questions regarding your visit please contact your care team:     Cardiology  Telephone Number   Ade KRISHNAMURTHY, SAMANTHA FABIAN,RN  Jossie OBREGON, EDUARDO TREVIZO, MA  Joe PENA, ROELN   [phone]    *After hours: [phone]   For scheduling appts:     [phone] or    [phone] (select option 1)    *After hours: [phone]     For the Device Clinic (Pacemakers and ICD's)  RN's :  uSsie Kline   During business hours: [phone]    *After business hours:  [phone] (select option 4)      Normal test result notifications will be released via [employer] or mailed within 7 business days.  All other test results, will be communicated via telephone once reviewed by your cardiologist.    If you need a medication refill please contact your pharmacy.  Please allow 3 business days for your refill to be completed.    As always, thank you for trusting us with your health care needs!

## 2019-04-18 NOTE — PROGRESS NOTES
SUBJECTIVE:  Travon Livingston is a 43 year old male who presents for evaluation of chest pain.  Started after kickboxing.  Started a week ago and is getting better now.  Currently it is 1 out of 10.  Made worse by sitting no symptoms that activity or exercise.    Patient had stents to mid LAD and distal LAD and also balloon angioplasty of D2 in January 2013.  Letter had staged proximal RCA stent.  Last July he presented with 5 out of 10 chest pain.  Repeat angiogram showed 95% stenosis of mid RCA.  Had a drug-eluting stent to mid RCA.  Since then patient was asymptomatic.    Current pain is not similar to the prior CAD pain.    Risk factors include hyperlipidemia and remote smoking and family history of premature coronary artery disease.    Patient Active Problem List    Diagnosis Date Noted     Chest pain 07/16/2018     Priority: Medium     Epigastric pain 07/20/2015     Priority: Medium     S/P angioplasty with stent 01/24/2013     Priority: Medium     3 stents placed        Hyperlipidemia LDL goal <70 01/24/2013     Priority: Medium     CAD S/P percutaneous coronary angioplasty 01/24/2013     Priority: Medium    .  Current Outpatient Medications   Medication Sig     aspirin EC 81 MG EC tablet Take 1 tablet by mouth daily.     cyclobenzaprine (FLEXERIL) 10 MG tablet Take 1 tablet (10 mg) by mouth 3 times daily as needed for muscle spasms     lisinopril (PRINIVIL/ZESTRIL) 10 MG tablet Take 1 tablet (10 mg) by mouth daily     metoprolol tartrate (LOPRESSOR) 25 MG tablet Take 0.5 tablets (12.5 mg) by mouth 2 times daily     rosuvastatin (CRESTOR) 40 MG tablet TAKE 1 TABLET BY MOUTH DAILY     ticagrelor (BRILINTA) 90 MG tablet Take 1 tablet (90 mg) by mouth 2 times daily     nitroGLYcerin (NITROSTAT) 0.4 MG sublingual tablet Place 1 tablet (0.4 mg) under the tongue every 5 minutes as needed for chest pain Can repeat x3, then call EMS     No current facility-administered medications for this visit.      Past Medical  History:   Diagnosis Date     High cholesterol      Myocardial infarction (H)      Past Surgical History:   Procedure Laterality Date     coronary stints[  2013     No Known Allergies  Social History     Socioeconomic History     Marital status:      Spouse name: Not on file     Number of children: Not on file     Years of education: Not on file     Highest education level: Not on file   Occupational History     Not on file   Social Needs     Financial resource strain: Not on file     Food insecurity:     Worry: Not on file     Inability: Not on file     Transportation needs:     Medical: Not on file     Non-medical: Not on file   Tobacco Use     Smoking status: Former Smoker     Packs/day: 1.00     Years: 15.00     Pack years: 15.00     Types: Cigarettes     Last attempt to quit: 2011     Years since quittin.6     Smokeless tobacco: Never Used   Substance and Sexual Activity     Alcohol use: Yes     Comment: daily, 1 to 2 beers      Drug use: No     Sexual activity: Yes     Partners: Female   Lifestyle     Physical activity:     Days per week: Not on file     Minutes per session: Not on file     Stress: Not on file   Relationships     Social connections:     Talks on phone: Not on file     Gets together: Not on file     Attends Orthodox service: Not on file     Active member of club or organization: Not on file     Attends meetings of clubs or organizations: Not on file     Relationship status: Not on file     Intimate partner violence:     Fear of current or ex partner: Not on file     Emotionally abused: Not on file     Physically abused: Not on file     Forced sexual activity: Not on file   Other Topics Concern     Parent/sibling w/ CABG, MI or angioplasty before 65F 55M? Yes   Social History Narrative     Not on file     Family History   Problem Relation Age of Onset     C.A.D. Father      Myocardial Infarction Father         45     Myocardial Infarction Paternal Uncle         45      Myocardial Infarction Paternal Uncle           REVIEW OF SYSTEMS:  General: negative, fever, chills, night sweats  Skin: negative, acne, rash and scaling  Eyes: negative, double vision, eye pain and photophobia  Ears/Nose/Throat: negative, nasal congestion and purulent rhinorrhea  Respiratory: No dyspnea on exertion, No cough, No hemoptysis and negative  Cardiovascular: negative, palpitations, tachycardia, irregular heart beat, exertional chest pain or pressure, paroxysmal nocturnal dyspnea, dyspnea on exertion and orthopnea       OBJECTIVE:  Blood pressure 122/77, pulse 73, weight 111.1 kg (245 lb), SpO2 97 %.  General Appearance: healthy, alert, active and no distress  Head: Normocephalic. No masses, lesions, tenderness or abnormalities  Eyes: conjuctiva clear, PERRL, EOM intact  Ears: External ears normal. Canals clear. TM's normal.  Nose: Nares normal  Mouth: normal  Neck: Supple, no cervical adenopathy, no thyromegaly  Lungs: clear to auscultation  Cardiac: regular rate and rhythm, normal S1 and S2, no murmur         ASSESSMENT/PLAN:  Patient here with atypical nonexertional chest pain.  1 / 10 intensity with no associated symptoms.   Started a week ago and is getting better  History of mid LAD, distal LAD stents and D1 angioplasty in January 2013 followed by staged proximal RCA stent.  Repeat angiogram in July 2018 showed 99% mid RCA disease and if this was stented.  Current symptoms are too atypical and nonexertional.  Suggested a stress test patient is not too keen.  As pain is improving will wait for another week.  If not completely subsiding will plan for further evaluation.  Patient is advised to continue current medication including Brilinta until next July.  Per orders.   Return to Clinic 1yr.

## 2019-04-18 NOTE — LETTER
4/18/2019      RE: Travon Livingston  [address]  [place] [address]       Dear Colleague,    Thank you for the opportunity to participate in the care of your patient, Travon Livingston, at the [facility] HE[facility] AT [facility]. Please see a copy of my visit note below.       SUBJECTIVE:  Travon Livingston is a 43 year old male who presents for evaluation of chest pain.  Started after kickboxing.  Started a week ago and is getting better now.  Currently it is 1 out of 10.  Made worse by sitting no symptoms that activity or exercise.    Patient had stents to mid LAD and distal LAD and also balloon angioplasty of D2 in January 2013.  Letter had staged proximal RCA stent.  Last July he presented with 5 out of 10 chest pain.  Repeat angiogram showed 95% stenosis of mid RCA.  Had a drug-eluting stent to mid RCA.  Since then patient was asymptomatic.    Current pain is not similar to the prior CAD pain.    Risk factors include hyperlipidemia and remote smoking and family history of premature coronary artery disease.    Patient Active Problem List    Diagnosis Date Noted     Chest pain 07/16/2018     Priority: Medium     Epigastric pain 07/20/2015     Priority: Medium     S/P angioplasty with stent 01/24/2013     Priority: Medium     3 stents placed        Hyperlipidemia LDL goal <70 01/24/2013     Priority: Medium     CAD S/P percutaneous coronary angioplasty 01/24/2013     Priority: Medium    .  Current Outpatient Medications   Medication Sig     aspirin EC 81 MG EC tablet Take 1 tablet by mouth daily.     cyclobenzaprine (FLEXERIL) 10 MG tablet Take 1 tablet (10 mg) by mouth 3 times daily as needed for muscle spasms     lisinopril (PRINIVIL/ZESTRIL) 10 MG tablet Take 1 tablet (10 mg) by mouth daily     metoprolol tartrate (LOPRESSOR) 25 MG tablet Take 0.5 tablets (12.5 mg) by mouth 2 times daily     rosuvastatin (CRESTOR) 40 MG tablet TAKE 1 TABLET  BY MOUTH DAILY     ticagrelor (BRILINTA) 90 MG tablet Take 1 tablet (90 mg) by mouth 2 times daily     nitroGLYcerin (NITROSTAT) 0.4 MG sublingual tablet Place 1 tablet (0.4 mg) under the tongue every 5 minutes as needed for chest pain Can repeat x3, then call EMS     No current facility-administered medications for this visit.      Past Medical History:   Diagnosis Date     High cholesterol      Myocardial infarction (H)      Past Surgical History:   Procedure Laterality Date     coronary stints[  2013     No Known Allergies  Social History     Socioeconomic History     Marital status:      Spouse name: Not on file     Number of children: Not on file     Years of education: Not on file     Highest education level: Not on file   Occupational History     Not on file   Social Needs     Financial resource strain: Not on file     Food insecurity:     Worry: Not on file     Inability: Not on file     Transportation needs:     Medical: Not on file     Non-medical: Not on file   Tobacco Use     Smoking status: Former Smoker     Packs/day: 1.00     Years: 15.00     Pack years: 15.00     Types: Cigarettes     Last attempt to quit: 2011     Years since quittin.6     Smokeless tobacco: Never Used   Substance and Sexual Activity     Alcohol use: Yes     Comment: daily, 1 to 2 beers      Drug use: No     Sexual activity: Yes     Partners: Female   Lifestyle     Physical activity:     Days per week: Not on file     Minutes per session: Not on file     Stress: Not on file   Relationships     Social connections:     Talks on phone: Not on file     Gets together: Not on file     Attends Mandaeism service: Not on file     Active member of club or organization: Not on file     Attends meetings of clubs or organizations: Not on file     Relationship status: Not on file     Intimate partner violence:     Fear of current or ex partner: Not on file     Emotionally abused: Not on file     Physically abused: Not on file      Forced sexual activity: Not on file   Other Topics Concern     Parent/sibling w/ CABG, MI or angioplasty before 65F 55M? Yes   Social History Narrative     Not on file     Family History   Problem Relation Age of Onset     C.A.D. Father      Myocardial Infarction Father         45     Myocardial Infarction Paternal Uncle         45     Myocardial Infarction Paternal Uncle           REVIEW OF SYSTEMS:  General: negative, fever, chills, night sweats  Skin: negative, acne, rash and scaling  Eyes: negative, double vision, eye pain and photophobia  Ears/Nose/Throat: negative, nasal congestion and purulent rhinorrhea  Respiratory: No dyspnea on exertion, No cough, No hemoptysis and negative  Cardiovascular: negative, palpitations, tachycardia, irregular heart beat, exertional chest pain or pressure, paroxysmal nocturnal dyspnea, dyspnea on exertion and orthopnea       OBJECTIVE:  Blood pressure 122/77, pulse 73, weight 111.1 kg (245 lb), SpO2 97 %.  General Appearance: healthy, alert, active and no distress  Head: Normocephalic. No masses, lesions, tenderness or abnormalities  Eyes: conjuctiva clear, PERRL, EOM intact  Ears: External ears normal. Canals clear. TM's normal.  Nose: Nares normal  Mouth: normal  Neck: Supple, no cervical adenopathy, no thyromegaly  Lungs: clear to auscultation  Cardiac: regular rate and rhythm, normal S1 and S2, no murmur         ASSESSMENT/PLAN:  Patient here with atypical nonexertional chest pain.  1 / 10 intensity with no associated symptoms.   Started a week ago and is getting better  History of mid LAD, distal LAD stents and D1 angioplasty in January 2013 followed by staged proximal RCA stent.  Repeat angiogram in July 2018 showed 99% mid RCA disease and if this was stented.  Current symptoms are too atypical and nonexertional.  Suggested a stress test patient is not too keen.  As pain is improving will wait for another week.  If not completely subsiding will plan for further  evaluation.  Patient is advised to continue current medication including Brilinta until next July.  Per orders.   Return to Clinic 1yr.    Please do not hesitate to contact me if you have any questions/concerns.     Sincerely,     ARIANE Hanna MD

## 2019-04-18 NOTE — NURSING NOTE
"Chief Complaint   Patient presents with     Coronary Artery Disease      5 month follow up for s/p PTCA. - dc- Per patient after kickboxing chest discomfort,,sob.,lightheaded,flutter       Initial /77 (BP Location: Right arm, Patient Position: Sitting, Cuff Size: Adult Large)   Pulse 73   Wt 111.1 kg (245 lb)   SpO2 97%   BMI 30.62 kg/m   Estimated body mass index is 30.62 kg/m  as calculated from the following:    Height as of 2/26/19: 1.905 m (6' 3\").    Weight as of this encounter: 111.1 kg (245 lb)..  BP completed using cuff size: large    Joe Lebron L.P.N.    "

## 2019-05-07 ENCOUNTER — OFFICE VISIT (OUTPATIENT)
Dept: FAMILY MEDICINE | Facility: CLINIC | Age: 44
End: 2019-05-07
Payer: COMMERCIAL

## 2019-05-07 VITALS
HEIGHT: 75 IN | WEIGHT: 241.13 LBS | TEMPERATURE: 99 F | SYSTOLIC BLOOD PRESSURE: 115 MMHG | HEART RATE: 73 BPM | BODY MASS INDEX: 29.98 KG/M2 | DIASTOLIC BLOOD PRESSURE: 72 MMHG

## 2019-05-07 DIAGNOSIS — Z95.820 S/P ANGIOPLASTY WITH STENT: ICD-10-CM

## 2019-05-07 DIAGNOSIS — Z00.00 ROUTINE GENERAL MEDICAL EXAMINATION AT A HEALTH CARE FACILITY: Primary | ICD-10-CM

## 2019-05-07 DIAGNOSIS — Z98.61 CAD S/P PERCUTANEOUS CORONARY ANGIOPLASTY: ICD-10-CM

## 2019-05-07 DIAGNOSIS — I25.10 CORONARY ARTERY DISEASE WITHOUT ANGINA PECTORIS, UNSPECIFIED VESSEL OR LESION TYPE, UNSPECIFIED WHETHER NATIVE OR TRANSPLANTED HEART: ICD-10-CM

## 2019-05-07 DIAGNOSIS — I25.10 CAD S/P PERCUTANEOUS CORONARY ANGIOPLASTY: ICD-10-CM

## 2019-05-07 PROCEDURE — 99396 PREV VISIT EST AGE 40-64: CPT | Performed by: PHYSICIAN ASSISTANT

## 2019-05-07 RX ORDER — ROSUVASTATIN CALCIUM 40 MG/1
40 TABLET, COATED ORAL DAILY
Qty: 90 TABLET | Refills: 3 | Status: SHIPPED | OUTPATIENT
Start: 2019-05-07 | End: 2020-06-12

## 2019-05-07 RX ORDER — METOPROLOL TARTRATE 25 MG/1
12.5 TABLET, FILM COATED ORAL 2 TIMES DAILY
Qty: 135 TABLET | Refills: 3 | Status: SHIPPED | OUTPATIENT
Start: 2019-05-07 | End: 2020-06-16

## 2019-05-07 RX ORDER — LISINOPRIL 10 MG/1
10 TABLET ORAL DAILY
Qty: 90 TABLET | Refills: 3 | Status: SHIPPED | OUTPATIENT
Start: 2019-05-07 | End: 2020-06-15

## 2019-05-07 ASSESSMENT — ENCOUNTER SYMPTOMS
DIZZINESS: 0
PALPITATIONS: 0
FEVER: 0
SHORTNESS OF BREATH: 0
FREQUENCY: 0
SORE THROAT: 0
MYALGIAS: 0
ABDOMINAL PAIN: 0
PARESTHESIAS: 0
WEAKNESS: 0
CHILLS: 0
EYE PAIN: 0
COUGH: 0
NAUSEA: 0
HEMATOCHEZIA: 0
ARTHRALGIAS: 0
HEADACHES: 0
JOINT SWELLING: 0
HEARTBURN: 0
DIARRHEA: 0
HEMATURIA: 0
DYSURIA: 0
NERVOUS/ANXIOUS: 0
CONSTIPATION: 0

## 2019-05-07 ASSESSMENT — MIFFLIN-ST. JEOR: SCORE: 2078.75

## 2019-05-07 NOTE — PROGRESS NOTES
SUBJECTIVE:   CC: Travon Livingston is an 43 year old male who presents for preventative health visit.     Healthy Habits:     Getting at least 3 servings of Calcium per day:  Yes    Bi-annual eye exam:  Yes    Dental care twice a year:  NO    Sleep apnea or symptoms of sleep apnea:  None    Diet:  Low fat/cholesterol    Frequency of exercise:  6-7 days/week    Duration of exercise:  45-60 minutes    Taking medications regularly:  No    Barriers to taking medications:  Problems remembering to take them    Medication side effects:  No significant flushing    PHQ-2 Total Score: 0    Additional concerns today:  No    Ability to successfully perform activities of daily living: Yes, no assistance needed  Home safety:  none identified   Hearing impairment: Yes, Tinnitus       Today's PHQ-2 Score:   PHQ-2 (  [employer]) 2019   Q1: Little interest or pleasure in doing things 0   Q2: Feeling down, depressed or hopeless 0   PHQ-2 Score 0   Q1: Little interest or pleasure in doing things Not at all   Q2: Feeling down, depressed or hopeless Not at all   PHQ-2 Score 0       Abuse: Current or Past(Physical, Sexual or Emotional)- No  Do you feel safe in your environment? Yes    Social History     Tobacco Use     Smoking status: Former Smoker     Packs/day: 1.00     Years: 15.00     Pack years: 15.00     Types: Cigarettes     Last attempt to quit: 2011     Years since quittin.7     Smokeless tobacco: Never Used   Substance Use Topics     Alcohol use: Yes     Comment: daily, 1 to 2 beers      If you drink alcohol do you typically have >3 drinks per day or >7 drinks per week? No    Alcohol Use 2019   Prescreen: >3 drinks/day or >7 drinks/week? No   Prescreen: >3 drinks/day or >7 drinks/week? -   No flowsheet data found.    Last PSA: No results found for: PSA    Reviewed orders with patient. Reviewed health maintenance and updated orders accordingly - Yes  Labs reviewed in EPIC    Reviewed and updated as needed this visit  "by clinical staff  Tobacco  Allergies  Meds  Med Hx  Surg Hx  Fam Hx  Soc Hx        Reviewed and updated as needed this visit by Provider            Review of Systems   Constitutional: Negative for chills and fever.   HENT: Negative for congestion, ear pain, hearing loss and sore throat.    Eyes: Negative for pain and visual disturbance.   Respiratory: Negative for cough and shortness of breath.    Cardiovascular: Negative for chest pain, palpitations and peripheral edema.   Gastrointestinal: Negative for abdominal pain, constipation, diarrhea, heartburn, hematochezia and nausea.   Genitourinary: Negative for discharge, dysuria, frequency, genital sores, hematuria, impotence and urgency.   Musculoskeletal: Negative for arthralgias, joint swelling and myalgias.   Skin: Negative for rash.   Neurological: Negative for dizziness, weakness, headaches and paresthesias.   Psychiatric/Behavioral: Negative for mood changes. The patient is not nervous/anxious.        OBJECTIVE:   /72   Pulse 73   Temp 99  F (37.2  C) (Oral)   Ht 1.896 m (6' 2.65\")   Wt 109.4 kg (241 lb 2 oz)   BMI 30.43 kg/m      Physical Exam  GENERAL: healthy, alert and no distress  EYES: Eyes grossly normal to inspection, PERRL and conjunctivae and sclerae normal  HENT: ear canals and TM's normal, nose and mouth without ulcers or lesions  NECK: no adenopathy, no asymmetry, masses, or scars and thyroid normal to palpation  RESP: lungs clear to auscultation - no rales, rhonchi or wheezes  CV: regular rate and rhythm, normal S1 S2, no S3 or S4, no murmur, click or rub, no peripheral edema and peripheral pulses strong  ABDOMEN: soft, nontender, no hepatosplenomegaly, no masses and bowel sounds normal  MS: no gross musculoskeletal defects noted, no edema  SKIN: no suspicious lesions or rashes  NEURO: Normal strength and tone, mentation intact and speech normal  PSYCH: mentation appears normal, affect normal/bright  LYMPH: no cervical, " "supraclavicular, axillary, or inguinal adenopathy    Diagnostic Test Results:  none     ASSESSMENT/PLAN:   (Z00.00) Routine general medical examination at a health care facility  (primary encounter diagnosis)  Comment: Well person   Plan: Lipid panel reflex to direct LDL Fasting, Basic        metabolic panel  (Ca, Cl, CO2, Creat, Gluc, K,         Na, BUN)        Diet, exercise, wellness and other preventive recommendations related to health maintenance were discussed.  Follow up as needed for acute issues.  Physical exam in 1 year.     (I25.10,  Z98.61) CAD S/P percutaneous coronary angioplasty  Comment:   Plan: ticagrelor (BRILINTA) 90 MG tablet        Continued per cardiology recommendation. I can fill. He is asymptomatic. Consider stopping after 2 years of use per cardiology recommendation.    (I25.10) Coronary artery disease without angina pectoris, unspecified vessel or lesion type, unspecified whether native or transplanted heart  Comment:   Plan: lisinopril (PRINIVIL/ZESTRIL) 10 MG tablet,         metoprolol tartrate (LOPRESSOR) 25 MG tablet,         rosuvastatin (CRESTOR) 40 MG tablet        Asymptomatic. Doing well.     (Z95.9) S/P angioplasty with stent  Comment:   Plan: lisinopril (PRINIVIL/ZESTRIL) 10 MG tablet,         metoprolol tartrate (LOPRESSOR) 25 MG tablet,         rosuvastatin (CRESTOR) 40 MG tablet        Asymptomatic.     COUNSELING:   Reviewed preventive health counseling, as reflected in patient instructions    BP Readings from Last 1 Encounters:   05/07/19 115/72     Estimated body mass index is 30.43 kg/m  as calculated from the following:    Height as of this encounter: 1.896 m (6' 2.65\").    Weight as of this encounter: 109.4 kg (241 lb 2 oz).       reports that he quit smoking about 7 years ago. His smoking use included cigarettes. He has a 15.00 pack-year smoking history. He has never used smokeless tobacco.      Counseling Resources:  ATP IV Guidelines  Pooled Cohorts Equation " Calculator  FRAX Risk Assessment  ICSI Preventive Guidelines  Dietary Guidelines for Americans, 2010  USDA's MyPlate  ASA Prophylaxis  Lung CA Screening    ONOFRE MORALES PA-C  [facility]

## 2019-05-07 NOTE — PROGRESS NOTES
"  SUBJECTIVE:   CC: Travon Livingston is an 43 year old male who presents for preventive health visit.     Healthy Habits:    Do you get at least three servings of calcium containing foods daily (dairy, green leafy vegetables, etc.)? { :013384::\"yes\"}    Amount of exercise or daily activities, outside of work: { :392449}    Problems taking medications regularly { :471124::\"No\"}    Medication side effects: { :450168::\"No\"}    Have you had an eye exam in the past two years? { :952092}    Do you see a dentist twice per year? { :589403}    Do you have sleep apnea, excessive snoring or daytime drowsiness?{ :143879}  {Outside tests to abstract? :624601}    {additional problems to add (Optional):823920}    Today's PHQ-2 Score:   PHQ-2 (  [employer]) 2019   Q1: Little interest or pleasure in doing things 0 0   Q2: Feeling down, depressed or hopeless 0 0   PHQ-2 Score 0 0   Q1: Little interest or pleasure in doing things Not at all -   Q2: Feeling down, depressed or hopeless Not at all -   PHQ-2 Score 0 -     {PHQ-2 LOOK IN ASSESSMENTS (Optional) :562619}  Abuse: Current or Past(Physical, Sexual or Emotional)- {YES/NO/NA:182207}  Do you feel safe in your environment? {YES/NO/NA:212131}    Social History     Tobacco Use     Smoking status: Former Smoker     Packs/day: 1.00     Years: 15.00     Pack years: 15.00     Types: Cigarettes     Last attempt to quit: 2011     Years since quittin.7     Smokeless tobacco: Never Used   Substance Use Topics     Alcohol use: Yes     Comment: daily, 1 to 2 beers      If you drink alcohol do you typically have >3 drinks per day or >7 drinks per week? {ETOH :589721}                      Last PSA: No results found for: PSA    Reviewed orders with patient. Reviewed health maintenance and updated orders accordingly - {Yes/No:177785::\"Yes\"}  {Chronicprobdata (Optional):018305}    Reviewed and updated as needed this visit by clinical staff         Reviewed and updated as needed " "this visit by Provider        {HISTORY OPTIONS (Optional):699147}    ROS:  { :158755::\"CONSTITUTIONAL: NEGATIVE for fever, chills, change in weight\",\"INTEGUMENTARY/SKIN: NEGATIVE for worrisome rashes, moles or lesions\",\"EYES: NEGATIVE for vision changes or irritation\",\"ENT: NEGATIVE for ear, mouth and throat problems\",\"RESP: NEGATIVE for significant cough or SOB\",\"CV: NEGATIVE for chest pain, palpitations or peripheral edema\",\"GI: NEGATIVE for nausea, abdominal pain, heartburn, or change in bowel habits\",\" male: negative for dysuria, hematuria, decreased urinary stream, erectile dysfunction, urethral discharge\",\"MUSCULOSKELETAL: NEGATIVE for significant arthralgias or myalgia\",\"NEURO: NEGATIVE for weakness, dizziness or paresthesias\",\"PSYCHIATRIC: NEGATIVE for changes in mood or affect\"}    OBJECTIVE:   There were no vitals taken for this visit.  EXAM:  {Exam Choices:746771}    {Diagnostic Test Results (Optional):059051::\"Diagnostic Test Results:\",\"none \"}    ASSESSMENT/PLAN:   {Diag Picklist:662199}    COUNSELING:  {MALE COUNSELING MESSAGES:543759::\"Reviewed preventive health counseling, as reflected in patient instructions\"}    BP Readings from Last 1 Encounters:   04/18/19 122/77     Estimated body mass index is 30.62 kg/m  as calculated from the following:    Height as of 2/26/19: 1.905 m (6' 3\").    Weight as of 4/18/19: 111.1 kg (245 lb).    {BP Counseling- Complete if BP >= 120/80  (Optional):167964}  {Weight Management Plan (ACO) Complete if BMI is abnormal-  Ages 18-64  BMI >24.9.  Age 65+ with BMI <23 or >30 (Optional):142923}     reports that he quit smoking about 7 years ago. His smoking use included cigarettes. He has a 15.00 pack-year smoking history. He has never used smokeless tobacco.  {Tobacco Cessation -- Complete if patient is a smoker (Optional):559248}    Counseling Resources:  ATP IV Guidelines  Pooled Cohorts Equation Calculator  FRAX Risk Assessment  ICSI Preventive Guidelines  Dietary " Guidelines for Americans, 2010  USDA's MyPlate  ASA Prophylaxis  Lung CA Screening    ONOFRE MORALES PA-C  [facility]

## 2019-05-07 NOTE — PATIENT INSTRUCTIONS
Preventive Health Recommendations  Male Ages 40 to 49    Yearly exam:             See your health care provider every year in order to  o   Review health changes.   o   Discuss preventive care.    o   Review your medicines if your doctor has prescribed any.    You should be tested each year for STDs (sexually transmitted diseases) if you re at risk.     Have a cholesterol test every 5 years.     Have a colonoscopy (test for colon cancer) if someone in your family has had colon cancer or polyps before age 50.     After age 45, have a diabetes test (fasting glucose). If you are at risk for diabetes, you should have this test every 3 years.      Talk with your health care provider about whether or not a prostate cancer screening test (PSA) is right for you.    Shots: Get a flu shot each year. Get a tetanus shot every 10 years.     Nutrition:    Eat at least 5 servings of fruits and vegetables daily.     Eat whole-grain bread, whole-wheat pasta and brown rice instead of white grains and rice.     Get adequate Calcium and Vitamin D.     Lifestyle    Exercise for at least 150 minutes a week (30 minutes a day, 5 days a week). This will help you control your weight and prevent disease.     Limit alcohol to one drink per day.     No smoking.     Wear sunscreen to prevent skin cancer.     See your dentist every six months for an exam and cleaning.      Preventive Health Recommendations  Male Ages 40 to 49    Yearly exam:             See your health care provider every year in order to  o   Review health changes.   o   Discuss preventive care.    o   Review your medicines if your doctor has prescribed any.    You should be tested each year for STDs (sexually transmitted diseases) if you re at risk.     Have a cholesterol test every 5 years.     Have a colonoscopy (test for colon cancer) if someone in your family has had colon cancer or polyps before age 50.     After age 45, have a diabetes test (fasting glucose). If you are  at risk for diabetes, you should have this test every 3 years.      Talk with your health care provider about whether or not a prostate cancer screening test (PSA) is right for you.    Shots: Get a flu shot each year. Get a tetanus shot every 10 years.     Nutrition:    Eat at least 5 servings of fruits and vegetables daily.     Eat whole-grain bread, whole-wheat pasta and brown rice instead of white grains and rice.     Get adequate Calcium and Vitamin D.     Lifestyle    Exercise for at least 150 minutes a week (30 minutes a day, 5 days a week). This will help you control your weight and prevent disease.     Limit alcohol to one drink per day.     No smoking.     Wear sunscreen to prevent skin cancer.     See your dentist every six months for an exam and cleaning.      Preventive Health Recommendations  Male Ages 40 to 49    Yearly exam:             See your health care provider every year in order to  o   Review health changes.   o   Discuss preventive care.    o   Review your medicines if your doctor has prescribed any.    You should be tested each year for STDs (sexually transmitted diseases) if you re at risk.     Have a cholesterol test every 5 years.     Have a colonoscopy (test for colon cancer) if someone in your family has had colon cancer or polyps before age 50.     After age 45, have a diabetes test (fasting glucose). If you are at risk for diabetes, you should have this test every 3 years.      Talk with your health care provider about whether or not a prostate cancer screening test (PSA) is right for you.    Shots: Get a flu shot each year. Get a tetanus shot every 10 years.     Nutrition:    Eat at least 5 servings of fruits and vegetables daily.     Eat whole-grain bread, whole-wheat pasta and brown rice instead of white grains and rice.     Get adequate Calcium and Vitamin D.     Lifestyle    Exercise for at least 150 minutes a week (30 minutes a day, 5 days a week). This will help you control  your weight and prevent disease.     Limit alcohol to one drink per day.     No smoking.     Wear sunscreen to prevent skin cancer.     See your dentist every six months for an exam and cleaning.

## 2019-05-09 DIAGNOSIS — Z00.00 ROUTINE GENERAL MEDICAL EXAMINATION AT A HEALTH CARE FACILITY: ICD-10-CM

## 2019-05-09 LAB
ANION GAP SERPL CALCULATED.3IONS-SCNC: 6 MMOL/L (ref 3–14)
BUN SERPL-MCNC: 16 MG/DL (ref 7–30)
CALCIUM SERPL-MCNC: 9.3 MG/DL (ref 8.5–10.1)
CHLORIDE SERPL-SCNC: 108 MMOL/L (ref 94–109)
CHOLEST SERPL-MCNC: 128 MG/DL
CO2 SERPL-SCNC: 24 MMOL/L (ref 20–32)
CREAT SERPL-MCNC: 0.91 MG/DL (ref 0.66–1.25)
GFR SERPL CREATININE-BSD FRML MDRD: >90 ML/MIN/{1.73_M2}
GLUCOSE SERPL-MCNC: 92 MG/DL (ref 70–99)
HDLC SERPL-MCNC: 51 MG/DL
LDLC SERPL CALC-MCNC: 61 MG/DL
NONHDLC SERPL-MCNC: 77 MG/DL
POTASSIUM SERPL-SCNC: 4.9 MMOL/L (ref 3.4–5.3)
SODIUM SERPL-SCNC: 138 MMOL/L (ref 133–144)
TRIGL SERPL-MCNC: 78 MG/DL

## 2019-05-09 PROCEDURE — 36415 COLL VENOUS BLD VENIPUNCTURE: CPT | Performed by: PHYSICIAN ASSISTANT

## 2019-05-09 PROCEDURE — 80048 BASIC METABOLIC PNL TOTAL CA: CPT | Performed by: PHYSICIAN ASSISTANT

## 2019-05-09 PROCEDURE — 80061 LIPID PANEL: CPT | Performed by: PHYSICIAN ASSISTANT

## 2019-05-20 NOTE — PLAN OF CARE
Patient states she is doing well.   Problem: Patient Care Overview  Goal: Plan of Care/Patient Progress Review  Outpatient/Observation goals to be met before discharge home:    PRIMARY DIAGNOSIS: s/p PCI  OUTPATIENT/OBSERVATION GOALS TO BE MET BEFORE DISCHARGE:  Hemodynamically stable: YES-bolus completed and continuous fluid of 75ml/hr of the rest of the liter infusing; BP's WNL at this time and HR is WNL as well; will continue to monitor.  No chest pain: NO-pt not having any CP at this time.  *Nurse to notify MD when observation goals have been met and patient is ready for discharge.

## 2019-07-05 ENCOUNTER — OFFICE VISIT (OUTPATIENT)
Dept: URGENT CARE | Facility: URGENT CARE | Age: 44
End: 2019-07-05
Payer: COMMERCIAL

## 2019-07-05 VITALS
SYSTOLIC BLOOD PRESSURE: 137 MMHG | OXYGEN SATURATION: 99 % | BODY MASS INDEX: 29.8 KG/M2 | WEIGHT: 236.2 LBS | DIASTOLIC BLOOD PRESSURE: 79 MMHG | TEMPERATURE: 98.6 F | HEART RATE: 66 BPM

## 2019-07-05 DIAGNOSIS — H57.11 EYE PAIN, RIGHT: ICD-10-CM

## 2019-07-05 DIAGNOSIS — S05.01XA ABRASION OF RIGHT CORNEA, INITIAL ENCOUNTER: Primary | ICD-10-CM

## 2019-07-05 PROCEDURE — 99213 OFFICE O/P EST LOW 20 MIN: CPT | Performed by: NURSE PRACTITIONER

## 2019-07-05 RX ORDER — POLYMYXIN B SULFATE AND TRIMETHOPRIM 1; 10000 MG/ML; [USP'U]/ML
1 SOLUTION OPHTHALMIC EVERY 4 HOURS
Qty: 1 BOTTLE | Refills: 0 | Status: SHIPPED | OUTPATIENT
Start: 2019-07-05 | End: 2019-07-12

## 2019-07-05 RX ORDER — DICLOFENAC SODIUM 1 MG/ML
1 SOLUTION/ DROPS OPHTHALMIC 4 TIMES DAILY
Qty: 2.5 ML | Refills: 0 | Status: SHIPPED | OUTPATIENT
Start: 2019-07-05 | End: 2019-07-10

## 2019-07-05 ASSESSMENT — ENCOUNTER SYMPTOMS
CHILLS: 0
SHORTNESS OF BREATH: 0
RHINORRHEA: 0
SORE THROAT: 0
DIARRHEA: 0
VOMITING: 0
NAUSEA: 0
DIAPHORESIS: 0
COUGH: 0
EYE REDNESS: 1
EYE PAIN: 1
FEVER: 0

## 2019-07-06 NOTE — PATIENT INSTRUCTIONS
Patient Education     Corneal Abrasion    You have received a scratch or scrape (abrasion) to your cornea. The cornea is the clear part in the front of the eye. This sensitive area is very painful when injured. You may make tears frequently, and your vision may be blurry until the injury heals. You may be sensitive to light.  This part of the body heals quickly. You can expect the pain to go away within 24 to 48 hours. If the abrasion is large or deep, your doctor may apply an eye patch, although this is not always done. An antibiotic ointment or eye drops may also be used to prevent infection.  Numbing drops may be used to relieve the pain temporarily so that your eyes can be examined. But these drops can t be prescribed for home use because that would prevent healing and lead to more serious problems. Also, if you can t feel your eye, there is a chance of accidentally injuring it further without knowing it.  Home care    A cold pack may be applied over the eye (or eye patch) for 20 minutes at a time, to reduce pain. To make a cold pack, put ice cubes in a plastic bag that seals at the top. Wrap the bag in a clean, thin towel or cloth.    You may use acetaminophen or ibuprofen to control pain, unless another pain medicine was prescribed. Note: If you have chronic liver or kidney disease or have ever had a stomach ulcer or GI bleeding, talk with your doctor before using these medicines.    Rest your eyes and don t read until symptoms are gone.    If you use contact lenses, don t wear them until all symptoms are gone.    If your vision is affected by the corneal abrasion or if an eye patch was applied, don t drive a motor vehicle or operate machinery until all symptoms are gone. You may have trouble judging distances using only one eye.    f your eyes are sensitive to light, try wearing sunglasses, or stay indoors until symptoms go away.  Follow-up care  Follow up with your healthcare provider, or as advised.    If  no patch was put on your eye and the pain continues for more than 48 hours, you should have another exam. Contact your healthcare provider to arrange this.    If your eye was patched and you were asked to remove the patch yourself, see your healthcare provider. Contact your healthcare provider if you still have pain after the patch is removed.    If you were given a return appointment for patch removal and re-examination, be sure to keep the appointment. Leaving the patch in place longer than advised could be harmful.  When to seek medical advice  Call your healthcare provider right away if any of these occur.    Increasing eye pain or pain that does not improve after 24 hours    Discharge from the eye    Increasing redness of the eye or swelling of the eyelids    Worsening vision    Symptoms get worse after the abrasion has healed  Date Last Reviewed: 7/1/2017 2000-2018 [employer]. [address] [place] [address], [place] [address]. All rights reserved. This information is not intended as a substitute for professional medical care. Always follow your healthcare professional's instructions.         I

## 2019-07-06 NOTE — PROGRESS NOTES
SUBJECTIVE:   Travon Livingston is a 44 year old male presenting with a chief complaint of   Chief Complaint   Patient presents with     Eye Problem     Patient injured right eye while using trimmers        He is an established patient of [facility].    Right eye injured    Onset of symptoms was 2 hour(s) ago. A plant scratched eye while using trimmers  Course of illness is worsening.    Severity moderate  Current and Associated symptoms: eye rednes, feels like a foreign body in ey  Treatment measures tried include None tried.  Predisposing factors include None.      Review of Systems   Constitutional: Negative for chills, diaphoresis and fever.   HENT: Negative for congestion, ear pain, rhinorrhea and sore throat.    Eyes: Positive for pain and redness.        Sensation of foreign object   Respiratory: Negative for cough and shortness of breath.    Gastrointestinal: Negative for diarrhea, nausea and vomiting.   All other systems reviewed and are negative.      Past Medical History:   Diagnosis Date     High cholesterol      Myocardial infarction (H)      Family History   Problem Relation Age of Onset     C.A.D. Father      Myocardial Infarction Father         45     Myocardial Infarction Paternal Uncle         45     Myocardial Infarction Paternal Uncle      Current Outpatient Medications   Medication Sig Dispense Refill     aspirin EC 81 MG EC tablet Take 1 tablet by mouth daily.       diclofenac (VOLTAREN) 0.1 % ophthalmic solution Place 1 drop into the right eye 4 times daily for 5 days 2.5 mL 0     lisinopril (PRINIVIL/ZESTRIL) 10 MG tablet Take 1 tablet (10 mg) by mouth daily 90 tablet 3     metoprolol tartrate (LOPRESSOR) 25 MG tablet Take 0.5 tablets (12.5 mg) by mouth 2 times daily 135 tablet 3     rosuvastatin (CRESTOR) 40 MG tablet Take 1 tablet (40 mg) by mouth daily 90 tablet 3     ticagrelor (BRILINTA) 90 MG tablet Take 1 tablet (90 mg) by mouth 2 times daily 180 tablet 3     trimethoprim-polymyxin b  (POLYTRIM) 16846-6.1 UNIT/ML-% ophthalmic solution Place 1 drop into both eyes every 4 hours for 7 days 1 Bottle 0     Social History     Tobacco Use     Smoking status: Former Smoker     Packs/day: 1.00     Years: 15.00     Pack years: 15.00     Types: Cigarettes     Last attempt to quit: 2011     Years since quittin.8     Smokeless tobacco: Never Used   Substance Use Topics     Alcohol use: Yes     Comment: daily, 1 to 2 beers        OBJECTIVE  /79 (BP Location: Left arm, Patient Position: Chair, Cuff Size: Adult Regular)   Pulse 66   Temp 98.6  F (37  C) (Oral)   Wt 107.1 kg (236 lb 3.2 oz)   SpO2 99%   BMI 29.80 kg/m      Physical Exam   Constitutional: No distress.   HENT:   Head: Atraumatic.   Mouth/Throat: Oropharynx is clear and moist.   Eyes: Pupils are equal, round, and reactive to light. EOM are normal. Lids are everted and swept, no foreign bodies found. Right eye exhibits discharge. Right conjunctiva is injected.   Neck: Normal range of motion.   Pulmonary/Chest: Effort normal and breath sounds normal. No respiratory distress.   Neurological: He is alert.   Skin: Skin is warm and dry.   Psychiatric: He has a normal mood and affect.   Nursing note and vitals reviewed.        ASSESSMENT:      ICD-10-CM    1. Abrasion of right cornea, initial encounter S05.01XA trimethoprim-polymyxin b (POLYTRIM) 70254-6.1 UNIT/ML-% ophthalmic solution   2. Eye pain, right H57.11 diclofenac (VOLTAREN) 0.1 % ophthalmic solution        PLAN:  The right  eye is injected clear discharge noted.   No foreign bodies present. Exam of eye with fluoroscein dye reveals an area of corneal abrasion on right eye..  Rest your eyes and don t read until symptoms are gone.  f your eyes are sensitive to light, try wearing sunglasses, or stay indoors until symptoms go away.  Antibiotics eye drops to prevent infection  Advised to see an eye doctor if there is increasing eye pain or pain that does not improve after 24 hours.  Discharge from the eye. Increasing redness of the eye or swelling of the eyelids. Worsening vision. Symptoms get worse after the abrasion has healed.  All questions are answered and no other concerns    Patient Instructions     Patient Education     Corneal Abrasion    You have received a scratch or scrape (abrasion) to your cornea. The cornea is the clear part in the front of the eye. This sensitive area is very painful when injured. You may make tears frequently, and your vision may be blurry until the injury heals. You may be sensitive to light.  This part of the body heals quickly. You can expect the pain to go away within 24 to 48 hours. If the abrasion is large or deep, your doctor may apply an eye patch, although this is not always done. An antibiotic ointment or eye drops may also be used to prevent infection.  Numbing drops may be used to relieve the pain temporarily so that your eyes can be examined. But these drops can t be prescribed for home use because that would prevent healing and lead to more serious problems. Also, if you can t feel your eye, there is a chance of accidentally injuring it further without knowing it.  Home care    A cold pack may be applied over the eye (or eye patch) for 20 minutes at a time, to reduce pain. To make a cold pack, put ice cubes in a plastic bag that seals at the top. Wrap the bag in a clean, thin towel or cloth.    You may use acetaminophen or ibuprofen to control pain, unless another pain medicine was prescribed. Note: If you have chronic liver or kidney disease or have ever had a stomach ulcer or GI bleeding, talk with your doctor before using these medicines.    Rest your eyes and don t read until symptoms are gone.    If you use contact lenses, don t wear them until all symptoms are gone.    If your vision is affected by the corneal abrasion or if an eye patch was applied, don t drive a motor vehicle or operate machinery until all symptoms are gone. You may have  trouble judging distances using only one eye.    If your eyes are sensitive to light, try wearing sunglasses, or stay indoors until symptoms go away.  Follow-up care  Follow up with your healthcare provider, or as advised.    If no patch was put on your eye and the pain continues for more than 48 hours, you should have another exam. Contact your healthcare provider to arrange this.    If your eye was patched and you were asked to remove the patch yourself, see your healthcare provider. Contact your healthcare provider if you still have pain after the patch is removed.    If you were given a return appointment for patch removal and re-examination, be sure to keep the appointment. Leaving the patch in place longer than advised could be harmful.  When to seek medical advice  Call your healthcare provider right away if any of these occur.    Increasing eye pain or pain that does not improve after 24 hours    Discharge from the eye    Increasing redness of the eye or swelling of the eyelids    Worsening vision    Symptoms get worse after the abrasion has healed  Date Last Reviewed: 7/1/2017 2000-2018 [employer]. [address] [place] [address], [place] [address]. All rights reserved. This information is not intended as a substitute for professional medical care. Always follow your healthcare professional's instructions.

## 2019-08-19 ENCOUNTER — TELEPHONE (OUTPATIENT)
Dept: CARDIOLOGY | Facility: CLINIC | Age: 44
End: 2019-08-19

## 2019-11-05 ENCOUNTER — HEALTH MAINTENANCE LETTER (OUTPATIENT)
Age: 44
End: 2019-11-05

## 2020-01-25 ENCOUNTER — APPOINTMENT (OUTPATIENT)
Dept: CARDIOLOGY | Facility: CLINIC | Age: 45
End: 2020-01-25
Attending: NURSE PRACTITIONER
Payer: COMMERCIAL

## 2020-01-25 ENCOUNTER — HOSPITAL ENCOUNTER (INPATIENT)
Facility: CLINIC | Age: 45
LOS: 1 days | Discharge: HOME | End: 2020-01-25
Attending: EMERGENCY MEDICINE | Admitting: INTERNAL MEDICINE
Payer: COMMERCIAL

## 2020-01-25 ENCOUNTER — APPOINTMENT (OUTPATIENT)
Dept: GENERAL RADIOLOGY | Facility: CLINIC | Age: 45
End: 2020-01-25
Attending: EMERGENCY MEDICINE
Payer: COMMERCIAL

## 2020-01-25 VITALS
HEIGHT: 75 IN | DIASTOLIC BLOOD PRESSURE: 75 MMHG | OXYGEN SATURATION: 99 % | RESPIRATION RATE: 14 BRPM | TEMPERATURE: 98.3 F | WEIGHT: 249.6 LBS | HEART RATE: 65 BPM | BODY MASS INDEX: 31.04 KG/M2 | SYSTOLIC BLOOD PRESSURE: 112 MMHG

## 2020-01-25 DIAGNOSIS — Z86.79 PERSONAL HISTORY OF CORONARY ARTERY DISEASE: ICD-10-CM

## 2020-01-25 DIAGNOSIS — R07.89 ATYPICAL CHEST PAIN: ICD-10-CM

## 2020-01-25 DIAGNOSIS — R11.0 NAUSEA: ICD-10-CM

## 2020-01-25 DIAGNOSIS — Z95.820 S/P ANGIOPLASTY WITH STENT: Primary | ICD-10-CM

## 2020-01-25 DIAGNOSIS — I20.0 UNSTABLE ANGINA (H): ICD-10-CM

## 2020-01-25 PROBLEM — R07.9 CHEST PAIN: Status: RESOLVED | Noted: 2018-07-16 | Resolved: 2020-01-25

## 2020-01-25 LAB
ALBUMIN SERPL-MCNC: 3.6 G/DL (ref 3.4–5)
ALP SERPL-CCNC: 77 U/L (ref 40–150)
ALT SERPL W P-5'-P-CCNC: 34 U/L (ref 0–70)
ANION GAP SERPL CALCULATED.3IONS-SCNC: 4 MMOL/L (ref 3–14)
AST SERPL W P-5'-P-CCNC: 27 U/L (ref 0–45)
BASOPHILS # BLD AUTO: 0.1 10E9/L (ref 0–0.2)
BASOPHILS NFR BLD AUTO: 1.3 %
BILIRUB SERPL-MCNC: 0.4 MG/DL (ref 0.2–1.3)
BUN SERPL-MCNC: 11 MG/DL (ref 7–30)
CALCIUM SERPL-MCNC: 8.1 MG/DL (ref 8.5–10.1)
CHLORIDE SERPL-SCNC: 112 MMOL/L (ref 94–109)
CHOLEST SERPL-MCNC: 113 MG/DL
CO2 SERPL-SCNC: 25 MMOL/L (ref 20–32)
CREAT SERPL-MCNC: 0.82 MG/DL (ref 0.66–1.25)
DIFFERENTIAL METHOD BLD: NORMAL
EOSINOPHIL # BLD AUTO: 0.2 10E9/L (ref 0–0.7)
EOSINOPHIL NFR BLD AUTO: 3.5 %
ERYTHROCYTE [DISTWIDTH] IN BLOOD BY AUTOMATED COUNT: 11.7 % (ref 10–15)
ERYTHROCYTE [DISTWIDTH] IN BLOOD BY AUTOMATED COUNT: 11.8 % (ref 10–15)
GFR SERPL CREATININE-BSD FRML MDRD: >90 ML/MIN/{1.73_M2}
GLUCOSE SERPL-MCNC: 114 MG/DL (ref 70–99)
HBA1C MFR BLD: 5.1 % (ref 0–5.6)
HCT VFR BLD AUTO: 41 % (ref 40–53)
HCT VFR BLD AUTO: 42.1 % (ref 40–53)
HDLC SERPL-MCNC: 53 MG/DL
HGB BLD-MCNC: 14.1 G/DL (ref 13.3–17.7)
HGB BLD-MCNC: 14.3 G/DL (ref 13.3–17.7)
IMM GRANULOCYTES # BLD: 0 10E9/L (ref 0–0.4)
IMM GRANULOCYTES NFR BLD: 0.2 %
INR PPP: 1 (ref 0.86–1.14)
INTERPRETATION ECG - MUSE: NORMAL
LDLC SERPL CALC-MCNC: 52 MG/DL
LIPASE SERPL-CCNC: 149 U/L (ref 73–393)
LYMPHOCYTES # BLD AUTO: 2.6 10E9/L (ref 0.8–5.3)
LYMPHOCYTES NFR BLD AUTO: 42.4 %
MCH RBC QN AUTO: 31.1 PG (ref 26.5–33)
MCH RBC QN AUTO: 31.2 PG (ref 26.5–33)
MCHC RBC AUTO-ENTMCNC: 34 G/DL (ref 31.5–36.5)
MCHC RBC AUTO-ENTMCNC: 34.4 G/DL (ref 31.5–36.5)
MCV RBC AUTO: 91 FL (ref 78–100)
MCV RBC AUTO: 92 FL (ref 78–100)
MONOCYTES # BLD AUTO: 0.9 10E9/L (ref 0–1.3)
MONOCYTES NFR BLD AUTO: 14.6 %
NEUTROPHILS # BLD AUTO: 2.3 10E9/L (ref 1.6–8.3)
NEUTROPHILS NFR BLD AUTO: 38 %
NONHDLC SERPL-MCNC: 60 MG/DL
NRBC # BLD AUTO: 0 10*3/UL
NRBC BLD AUTO-RTO: 0 /100
PLATELET # BLD AUTO: 265 10E9/L (ref 150–450)
PLATELET # BLD AUTO: 273 10E9/L (ref 150–450)
POTASSIUM SERPL-SCNC: 4.1 MMOL/L (ref 3.4–5.3)
PROT SERPL-MCNC: 6.6 G/DL (ref 6.8–8.8)
RBC # BLD AUTO: 4.52 10E12/L (ref 4.4–5.9)
RBC # BLD AUTO: 4.6 10E12/L (ref 4.4–5.9)
SODIUM SERPL-SCNC: 140 MMOL/L (ref 133–144)
TRIGL SERPL-MCNC: 41 MG/DL
TROPONIN I BLD-MCNC: 0 UG/L (ref 0–0.08)
TROPONIN I SERPL-MCNC: <0.015 UG/L (ref 0–0.04)
TROPONIN I SERPL-MCNC: <0.015 UG/L (ref 0–0.04)
TSH SERPL DL<=0.005 MIU/L-ACNC: 0.61 MU/L (ref 0.4–4)
WBC # BLD AUTO: 6 10E9/L (ref 4–11)
WBC # BLD AUTO: 7.5 10E9/L (ref 4–11)

## 2020-01-25 PROCEDURE — 99291 CRITICAL CARE FIRST HOUR: CPT | Mod: 25 | Performed by: EMERGENCY MEDICINE

## 2020-01-25 PROCEDURE — C1887 CATHETER, GUIDING: HCPCS | Performed by: INTERNAL MEDICINE

## 2020-01-25 PROCEDURE — 83036 HEMOGLOBIN GLYCOSYLATED A1C: CPT | Performed by: STUDENT IN AN ORGANIZED HEALTH CARE EDUCATION/TRAINING PROGRAM

## 2020-01-25 PROCEDURE — 84484 ASSAY OF TROPONIN QUANT: CPT

## 2020-01-25 PROCEDURE — 4A023N7 MEASUREMENT OF CARDIAC SAMPLING AND PRESSURE, LEFT HEART, PERCUTANEOUS APPROACH: ICD-10-PCS | Performed by: INTERNAL MEDICINE

## 2020-01-25 PROCEDURE — 90686 IIV4 VACC NO PRSV 0.5 ML IM: CPT | Performed by: INTERNAL MEDICINE

## 2020-01-25 PROCEDURE — 27210762 ZZH DEVICE SUTURELESS SECUREMENT EA CR2: Performed by: INTERNAL MEDICINE

## 2020-01-25 PROCEDURE — 93321 DOPPLER ECHO F-UP/LMTD STD: CPT | Mod: 26 | Performed by: INTERNAL MEDICINE

## 2020-01-25 PROCEDURE — 99152 MOD SED SAME PHYS/QHP 5/>YRS: CPT | Performed by: INTERNAL MEDICINE

## 2020-01-25 PROCEDURE — 25000128 H RX IP 250 OP 636: Performed by: INTERNAL MEDICINE

## 2020-01-25 PROCEDURE — 36415 COLL VENOUS BLD VENIPUNCTURE: CPT | Performed by: NURSE PRACTITIONER

## 2020-01-25 PROCEDURE — 25000132 ZZH RX MED GY IP 250 OP 250 PS 637: Performed by: STUDENT IN AN ORGANIZED HEALTH CARE EDUCATION/TRAINING PROGRAM

## 2020-01-25 PROCEDURE — 93325 DOPPLER ECHO COLOR FLOW MAPG: CPT | Mod: 26 | Performed by: INTERNAL MEDICINE

## 2020-01-25 PROCEDURE — 80061 LIPID PANEL: CPT | Performed by: NURSE PRACTITIONER

## 2020-01-25 PROCEDURE — 93308 TTE F-UP OR LMTD: CPT | Mod: 26 | Performed by: INTERNAL MEDICINE

## 2020-01-25 PROCEDURE — 85520 HEPARIN ASSAY: CPT | Performed by: NURSE PRACTITIONER

## 2020-01-25 PROCEDURE — 93321 DOPPLER ECHO F-UP/LMTD STD: CPT

## 2020-01-25 PROCEDURE — 25000125 ZZHC RX 250: Performed by: INTERNAL MEDICINE

## 2020-01-25 PROCEDURE — 99285 EMERGENCY DEPT VISIT HI MDM: CPT | Mod: 25 | Performed by: EMERGENCY MEDICINE

## 2020-01-25 PROCEDURE — 83690 ASSAY OF LIPASE: CPT | Performed by: EMERGENCY MEDICINE

## 2020-01-25 PROCEDURE — B2111ZZ FLUOROSCOPY OF MULTIPLE CORONARY ARTERIES USING LOW OSMOLAR CONTRAST: ICD-10-PCS | Performed by: INTERNAL MEDICINE

## 2020-01-25 PROCEDURE — C1894 INTRO/SHEATH, NON-LASER: HCPCS | Performed by: INTERNAL MEDICINE

## 2020-01-25 PROCEDURE — 80053 COMPREHEN METABOLIC PANEL: CPT | Performed by: EMERGENCY MEDICINE

## 2020-01-25 PROCEDURE — 71046 X-RAY EXAM CHEST 2 VIEWS: CPT

## 2020-01-25 PROCEDURE — 85027 COMPLETE CBC AUTOMATED: CPT | Performed by: STUDENT IN AN ORGANIZED HEALTH CARE EDUCATION/TRAINING PROGRAM

## 2020-01-25 PROCEDURE — 99292 CRITICAL CARE ADDL 30 MIN: CPT | Mod: 25 | Performed by: EMERGENCY MEDICINE

## 2020-01-25 PROCEDURE — 93458 L HRT ARTERY/VENTRICLE ANGIO: CPT | Mod: 26 | Performed by: INTERNAL MEDICINE

## 2020-01-25 PROCEDURE — 93458 L HRT ARTERY/VENTRICLE ANGIO: CPT | Performed by: INTERNAL MEDICINE

## 2020-01-25 PROCEDURE — 93005 ELECTROCARDIOGRAM TRACING: CPT | Performed by: EMERGENCY MEDICINE

## 2020-01-25 PROCEDURE — 84484 ASSAY OF TROPONIN QUANT: CPT | Performed by: NURSE PRACTITIONER

## 2020-01-25 PROCEDURE — 84443 ASSAY THYROID STIM HORMONE: CPT | Performed by: NURSE PRACTITIONER

## 2020-01-25 PROCEDURE — 21400000 ZZH R&B CCU UMMC

## 2020-01-25 PROCEDURE — 25000128 H RX IP 250 OP 636: Performed by: EMERGENCY MEDICINE

## 2020-01-25 PROCEDURE — 96365 THER/PROPH/DIAG IV INF INIT: CPT | Performed by: EMERGENCY MEDICINE

## 2020-01-25 PROCEDURE — 84484 ASSAY OF TROPONIN QUANT: CPT | Performed by: EMERGENCY MEDICINE

## 2020-01-25 PROCEDURE — 85610 PROTHROMBIN TIME: CPT | Performed by: EMERGENCY MEDICINE

## 2020-01-25 PROCEDURE — 27210794 ZZH OR GENERAL SUPPLY STERILE: Performed by: INTERNAL MEDICINE

## 2020-01-25 PROCEDURE — 85025 COMPLETE CBC W/AUTO DIFF WBC: CPT | Performed by: EMERGENCY MEDICINE

## 2020-01-25 PROCEDURE — 36415 COLL VENOUS BLD VENIPUNCTURE: CPT | Performed by: STUDENT IN AN ORGANIZED HEALTH CARE EDUCATION/TRAINING PROGRAM

## 2020-01-25 PROCEDURE — 96366 THER/PROPH/DIAG IV INF ADDON: CPT | Performed by: EMERGENCY MEDICINE

## 2020-01-25 RX ORDER — NOREPINEPHRINE BITARTRATE 0.06 MG/ML
.03-.4 INJECTION, SOLUTION INTRAVENOUS CONTINUOUS PRN
Status: DISCONTINUED | OUTPATIENT
Start: 2020-01-25 | End: 2020-01-25 | Stop reason: HOSPADM

## 2020-01-25 RX ORDER — ACETAMINOPHEN 325 MG/1
650 TABLET ORAL EVERY 4 HOURS PRN
Status: DISCONTINUED | OUTPATIENT
Start: 2020-01-25 | End: 2020-01-25 | Stop reason: HOSPADM

## 2020-01-25 RX ORDER — NALOXONE HYDROCHLORIDE 0.4 MG/ML
.2-.4 INJECTION, SOLUTION INTRAMUSCULAR; INTRAVENOUS; SUBCUTANEOUS
Status: DISCONTINUED | OUTPATIENT
Start: 2020-01-25 | End: 2020-01-25

## 2020-01-25 RX ORDER — ONDANSETRON 2 MG/ML
4 INJECTION INTRAMUSCULAR; INTRAVENOUS EVERY 6 HOURS PRN
Status: DISCONTINUED | OUTPATIENT
Start: 2020-01-25 | End: 2020-01-25 | Stop reason: HOSPADM

## 2020-01-25 RX ORDER — NICARDIPINE HYDROCHLORIDE 2.5 MG/ML
INJECTION INTRAVENOUS
Status: DISCONTINUED | OUTPATIENT
Start: 2020-01-25 | End: 2020-01-25 | Stop reason: HOSPADM

## 2020-01-25 RX ORDER — ACETAMINOPHEN 650 MG/1
650 SUPPOSITORY RECTAL EVERY 4 HOURS PRN
Status: DISCONTINUED | OUTPATIENT
Start: 2020-01-25 | End: 2020-01-25 | Stop reason: HOSPADM

## 2020-01-25 RX ORDER — IOPAMIDOL 755 MG/ML
INJECTION, SOLUTION INTRAVASCULAR
Status: DISCONTINUED | OUTPATIENT
Start: 2020-01-25 | End: 2020-01-25 | Stop reason: HOSPADM

## 2020-01-25 RX ORDER — LIDOCAINE 40 MG/G
CREAM TOPICAL
Status: DISCONTINUED | OUTPATIENT
Start: 2020-01-25 | End: 2020-01-25 | Stop reason: HOSPADM

## 2020-01-25 RX ORDER — FENTANYL CITRATE 50 UG/ML
INJECTION, SOLUTION INTRAMUSCULAR; INTRAVENOUS
Status: DISCONTINUED | OUTPATIENT
Start: 2020-01-25 | End: 2020-01-25 | Stop reason: HOSPADM

## 2020-01-25 RX ORDER — TIROFIBAN HYDROCHLORIDE 50 UG/ML
0.15 INJECTION INTRAVENOUS CONTINUOUS PRN
Status: DISCONTINUED | OUTPATIENT
Start: 2020-01-25 | End: 2020-01-25 | Stop reason: HOSPADM

## 2020-01-25 RX ORDER — ATROPINE SULFATE 0.1 MG/ML
0.5 INJECTION INTRAVENOUS EVERY 5 MIN PRN
Status: DISCONTINUED | OUTPATIENT
Start: 2020-01-25 | End: 2020-01-25 | Stop reason: HOSPADM

## 2020-01-25 RX ORDER — HEPARIN SODIUM 10000 [USP'U]/100ML
100-1000 INJECTION, SOLUTION INTRAVENOUS CONTINUOUS PRN
Status: DISCONTINUED | OUTPATIENT
Start: 2020-01-25 | End: 2020-01-25 | Stop reason: HOSPADM

## 2020-01-25 RX ORDER — FENTANYL CITRATE 50 UG/ML
25-50 INJECTION, SOLUTION INTRAMUSCULAR; INTRAVENOUS
Status: DISCONTINUED | OUTPATIENT
Start: 2020-01-25 | End: 2020-01-25 | Stop reason: HOSPADM

## 2020-01-25 RX ORDER — EPTIFIBATIDE 2 MG/ML
180 INJECTION, SOLUTION INTRAVENOUS EVERY 10 MIN PRN
Status: DISCONTINUED | OUTPATIENT
Start: 2020-01-25 | End: 2020-01-25 | Stop reason: HOSPADM

## 2020-01-25 RX ORDER — HEPARIN SODIUM 10000 [USP'U]/100ML
0-3500 INJECTION, SOLUTION INTRAVENOUS CONTINUOUS
Status: DISCONTINUED | OUTPATIENT
Start: 2020-01-25 | End: 2020-01-25

## 2020-01-25 RX ORDER — EPTIFIBATIDE 2 MG/ML
2 INJECTION, SOLUTION INTRAVENOUS CONTINUOUS PRN
Status: DISCONTINUED | OUTPATIENT
Start: 2020-01-25 | End: 2020-01-25 | Stop reason: HOSPADM

## 2020-01-25 RX ORDER — ROSUVASTATIN CALCIUM 20 MG/1
40 TABLET, COATED ORAL DAILY
Status: DISCONTINUED | OUTPATIENT
Start: 2020-01-25 | End: 2020-01-25 | Stop reason: HOSPADM

## 2020-01-25 RX ORDER — ARGATROBAN 1 MG/ML
350 INJECTION, SOLUTION INTRAVENOUS
Status: DISCONTINUED | OUTPATIENT
Start: 2020-01-25 | End: 2020-01-25 | Stop reason: HOSPADM

## 2020-01-25 RX ORDER — ASPIRIN 81 MG/1
81 TABLET ORAL DAILY
Status: DISCONTINUED | OUTPATIENT
Start: 2020-01-26 | End: 2020-01-25 | Stop reason: HOSPADM

## 2020-01-25 RX ORDER — DOBUTAMINE HYDROCHLORIDE 200 MG/100ML
2-20 INJECTION INTRAVENOUS CONTINUOUS PRN
Status: DISCONTINUED | OUTPATIENT
Start: 2020-01-25 | End: 2020-01-25 | Stop reason: HOSPADM

## 2020-01-25 RX ORDER — NITROGLYCERIN 5 MG/ML
VIAL (ML) INTRAVENOUS
Status: DISCONTINUED | OUTPATIENT
Start: 2020-01-25 | End: 2020-01-25 | Stop reason: HOSPADM

## 2020-01-25 RX ORDER — HEPARIN SODIUM 1000 [USP'U]/ML
INJECTION, SOLUTION INTRAVENOUS; SUBCUTANEOUS
Status: DISCONTINUED | OUTPATIENT
Start: 2020-01-25 | End: 2020-01-25 | Stop reason: HOSPADM

## 2020-01-25 RX ORDER — ALUMINA, MAGNESIA, AND SIMETHICONE 2400; 2400; 240 MG/30ML; MG/30ML; MG/30ML
30 SUSPENSION ORAL EVERY 4 HOURS PRN
Status: DISCONTINUED | OUTPATIENT
Start: 2020-01-25 | End: 2020-01-25 | Stop reason: HOSPADM

## 2020-01-25 RX ORDER — NITROGLYCERIN 20 MG/100ML
.07-1.77 INJECTION INTRAVENOUS CONTINUOUS PRN
Status: DISCONTINUED | OUTPATIENT
Start: 2020-01-25 | End: 2020-01-25 | Stop reason: HOSPADM

## 2020-01-25 RX ORDER — DOPAMINE HYDROCHLORIDE 160 MG/100ML
2-20 INJECTION, SOLUTION INTRAVENOUS CONTINUOUS PRN
Status: DISCONTINUED | OUTPATIENT
Start: 2020-01-25 | End: 2020-01-25 | Stop reason: HOSPADM

## 2020-01-25 RX ORDER — NITROGLYCERIN 0.4 MG/1
0.4 TABLET SUBLINGUAL EVERY 5 MIN PRN
Status: DISCONTINUED | OUTPATIENT
Start: 2020-01-25 | End: 2020-01-25 | Stop reason: HOSPADM

## 2020-01-25 RX ORDER — NALOXONE HYDROCHLORIDE 0.4 MG/ML
.1-.4 INJECTION, SOLUTION INTRAMUSCULAR; INTRAVENOUS; SUBCUTANEOUS
Status: DISCONTINUED | OUTPATIENT
Start: 2020-01-25 | End: 2020-01-25 | Stop reason: HOSPADM

## 2020-01-25 RX ORDER — ARGATROBAN 1 MG/ML
150 INJECTION, SOLUTION INTRAVENOUS
Status: DISCONTINUED | OUTPATIENT
Start: 2020-01-25 | End: 2020-01-25 | Stop reason: HOSPADM

## 2020-01-25 RX ORDER — FLUMAZENIL 0.1 MG/ML
0.2 INJECTION, SOLUTION INTRAVENOUS
Status: DISCONTINUED | OUTPATIENT
Start: 2020-01-25 | End: 2020-01-25 | Stop reason: HOSPADM

## 2020-01-25 RX ORDER — ONDANSETRON 4 MG/1
4 TABLET, ORALLY DISINTEGRATING ORAL EVERY 6 HOURS PRN
Status: DISCONTINUED | OUTPATIENT
Start: 2020-01-25 | End: 2020-01-25 | Stop reason: HOSPADM

## 2020-01-25 RX ADMIN — HEPARIN SODIUM 1200 UNITS/HR: 10000 INJECTION, SOLUTION INTRAVENOUS at 06:39

## 2020-01-25 RX ADMIN — INFLUENZA A VIRUS A/BRISBANE/02/2018 IVR-190 (H1N1) ANTIGEN (FORMALDEHYDE INACTIVATED), INFLUENZA A VIRUS A/KANSAS/14/2017 X-327 (H3N2) ANTIGEN (FORMALDEHYDE INACTIVATED), INFLUENZA B VIRUS B/PHUKET/3073/2013 ANTIGEN (FORMALDEHYDE INACTIVATED), AND INFLUENZA B VIRUS B/MARYLAND/15/2016 BX-69A ANTIGEN (FORMALDEHYDE INACTIVATED) 0.5 ML: 15; 15; 15; 15 INJECTION, SUSPENSION INTRAMUSCULAR at 14:40

## 2020-01-25 RX ADMIN — Medication 12.5 MG: at 14:43

## 2020-01-25 ASSESSMENT — ENCOUNTER SYMPTOMS
WEAKNESS: 0
CONFUSION: 0
NAUSEA: 1
BRUISES/BLEEDS EASILY: 1
SHORTNESS OF BREATH: 0
BACK PAIN: 0
FEVER: 0
DIAPHORESIS: 1
SORE THROAT: 0
DYSURIA: 0

## 2020-01-25 ASSESSMENT — MIFFLIN-ST. JEOR
SCORE: 2117.81
SCORE: 2119.62

## 2020-01-25 ASSESSMENT — ACTIVITIES OF DAILY LIVING (ADL): ADLS_ACUITY_SCORE: 10

## 2020-01-25 NOTE — ED PROVIDER NOTES
History     Chief Complaint   Patient presents with     Abdominal Pain     Nausea     HPI  Travon Livingston is a 44 year old male who has a past medical history of coronary artery disease status post myocardial infarction with cardiac stents (history of mid LAD, distal LAD stents and D1 angioplasty in January 2013, mid RCA stent in 2018), strong family  History of premature coronary artery disease who presents emergency department from home by EMS with a complaint of nausea and abdominal pain.  Patient reports that he woke up around 3 AM with nausea, and a discomfort in his epigastric abdominal region.  Patient describes the pain as a burning sensation and deep throbbing sensation.  Patient reports pain was similar to his prior atypical chest pain presentations in 2013 and 2018 in which she had cardiac stents placed.  Pain was associated with diaphoresis.  Patient denies any fever, vomiting, chest pain, shortness of breath.  Patient reports to drinking approximately 4 beers last night.  Patient states he is been taking his medications which include lisinopril, metoprolol, Crestor, and Brilinta.  Patient received 325 mg aspirin in route by EMS.  Patient concerned that he is having another cardiac event.    I have reviewed the Medications, Allergies, Past Medical and Surgical History, and Social History in the Epic system.    Review of Systems   Constitutional: Positive for diaphoresis. Negative for fever.   HENT: Negative for sore throat.    Eyes: Negative for visual disturbance.   Respiratory: Negative for shortness of breath.    Cardiovascular: Negative for chest pain.   Gastrointestinal: Positive for nausea.   Endocrine: Negative for polyuria.   Genitourinary: Negative for dysuria.   Musculoskeletal: Negative for back pain.   Skin: Negative for rash.   Allergic/Immunologic: Positive for immunocompromised state.   Neurological: Negative for weakness.   Hematological: Bruises/bleeds easily.   Psychiatric/Behavioral:  "Negative for confusion.       Physical Exam   BP: 133/87  Pulse: 68  Heart Rate: 65  Temp: 97.7  F (36.5  C)  Resp: 18  Height: 190.5 cm (6' 3\")  Weight: 113.4 kg (250 lb)  SpO2: 100 %      Physical Exam  General: ill but non toxic appearing, pale, clammy, diaphoretic  HENT: MMM, no oropharyngeal lesions  Eyes: PERRL, normal sclerae  Neck: non-tender, supple  Cardio: regular rate. Regular rhythm. Extremities well perfused  Resp: Normal work of breathing, clear breath sounds  Chest/Back: no visual signs of trauma, no CVA tenderness  Abdomen: no tenderness, non-distended, no rebound, no guarding  Neuro: alert and fully oriented. CN II-XII grossly intact. Grossly normal strength and sensation in all extremities.   MSK: no deformities.   Integumentary/Skin: no rash visualized, normal color  Psych: normal affect, normal behavior    ED Course        Procedures       EKG Interpretation:      Interpreted by Elisha Jordan MD  Time reviewed: 0417  Symptoms at time of EKG: nausea, diaphoresis    Rhythm: normal sinus   Rate: normal  Axis: normal  Ectopy: none  Conduction: normal  ST Segments/ T Waves: No ST-T wave changes  Q Waves: none  Comparison to prior: Unchanged from July 2018    Clinical Impression: normal EKG, no acute ischemic change      Critical Care time: . CRITICAL CARE: 75 minutes exclusive of procedures but including obtaining history, bedside examination, supervision of care, record review and collateral history from other parties, documentation, review/interpretation of imaging and lab results, discussion with patient, family, nursing, ancillary staff, consultants, and admitting service provider.   This patient presented with unstable angina, significant cardiac history requiring immediate bedside evaluation and intervention to prevent sudden, clinically significant, or life threatening deterioration in the patient's condition.  Results for orders placed or performed during the hospital encounter of " 01/25/20   Chest XR,  PA & LAT     Status: None (Preliminary result)    Narrative    This is a preliminary resident report. Full report will follow.      Impression    Impression:   No acute cardiopulmonary abnormalities.   Comprehensive metabolic panel     Status: Abnormal   Result Value Ref Range    Sodium 140 133 - 144 mmol/L    Potassium 4.1 3.4 - 5.3 mmol/L    Chloride 112 (H) 94 - 109 mmol/L    Carbon Dioxide 25 20 - 32 mmol/L    Anion Gap 4 3 - 14 mmol/L    Glucose 114 (H) 70 - 99 mg/dL    Urea Nitrogen 11 7 - 30 mg/dL    Creatinine 0.82 0.66 - 1.25 mg/dL    GFR Estimate >90 >60 mL/min/[1.73_m2]    GFR Estimate If Black >90 >60 mL/min/[1.73_m2]    Calcium 8.1 (L) 8.5 - 10.1 mg/dL    Bilirubin Total 0.4 0.2 - 1.3 mg/dL    Albumin 3.6 3.4 - 5.0 g/dL    Protein Total 6.6 (L) 6.8 - 8.8 g/dL    Alkaline Phosphatase 77 40 - 150 U/L    ALT 34 0 - 70 U/L    AST 27 0 - 45 U/L   CBC with platelets differential     Status: None   Result Value Ref Range    WBC 6.0 4.0 - 11.0 10e9/L    RBC Count 4.52 4.4 - 5.9 10e12/L    Hemoglobin 14.1 13.3 - 17.7 g/dL    Hematocrit 41.0 40.0 - 53.0 %    MCV 91 78 - 100 fl    MCH 31.2 26.5 - 33.0 pg    MCHC 34.4 31.5 - 36.5 g/dL    RDW 11.7 10.0 - 15.0 %    Platelet Count 265 150 - 450 10e9/L    Diff Method Automated Method     % Neutrophils 38.0 %    % Lymphocytes 42.4 %    % Monocytes 14.6 %    % Eosinophils 3.5 %    % Basophils 1.3 %    % Immature Granulocytes 0.2 %    Nucleated RBCs 0 0 /100    Absolute Neutrophil 2.3 1.6 - 8.3 10e9/L    Absolute Lymphocytes 2.6 0.8 - 5.3 10e9/L    Absolute Monocytes 0.9 0.0 - 1.3 10e9/L    Absolute Eosinophils 0.2 0.0 - 0.7 10e9/L    Absolute Basophils 0.1 0.0 - 0.2 10e9/L    Abs Immature Granulocytes 0.0 0 - 0.4 10e9/L    Absolute Nucleated RBC 0.0    Troponin I     Status: None   Result Value Ref Range    Troponin I ES <0.015 0.000 - 0.045 ug/L   Lipase     Status: None   Result Value Ref Range    Lipase 149 73 - 393 U/L   INR     Status: None    Result Value Ref Range    INR 1.00 0.86 - 1.14   EKG 12 lead     Status: None (Preliminary result)   Result Value Ref Range    Interpretation ECG Click View Image link to view waveform and result    Troponin POCT     Status: None   Result Value Ref Range    Troponin I 0.00 0.00 - 0.08 ug/L       Assessments & Plan (with Medical Decision Making)   Travon Livingston is a 44 year old male who has a past medical history of coronary artery disease status post myocardial infarction with cardiac stents (history of mid LAD, distal LAD stents and D1 angioplasty in January 2013, mid RCA stent in 2018), strong family  History of premature coronary artery disease who presents emergency department from home by EMS with a complaint of nausea and abdominal pain.  Upon arrival patient is ill but nontoxic appearing, pale, clammy, diaphoretic.  Patient does report some improvement in his nausea and symptoms after aspirin, Zofran, 250 cc IV fluid bolus in route by EMS.  Patient reports similar presentation back in 2013 and 2018 when he had his cardiac stents placed.    At this time will plan for continuous cardiac monitoring, EKG, comprehensive labs, chest x-ray and reevaluate.    I reviewed EKG which demonstrates normal sinus rhythm with a ventricular rate of 60 bpm with no acute ischemic change and unchanged from prior EKG in July 2018.  I reviewed comprehensive labs which are unremarkable with white blood cell count 6, hemoglobin 14.1, no acute metabolic electrolyte abnormality, negative troponin.  I reviewed chest x-ray which is unremarkable with no acute cardiopulmonary process, no pneumothorax, pneumonia, effusion.    I discussed the case with cardiology staff  @ 0530 am and with the cardiology fellow who will come evaluate the patient in the emergency department and plan on admission for unstable angina.  Patient did receive aspirin prior to arrival by EMS.  Will start patient on a heparin bolus and drip.  Patient  understands and agrees with the plan.    I have reviewed the nursing notes.    I have reviewed the findings, diagnosis, plan and need for follow up with the patient.    New Prescriptions    No medications on file       Final diagnoses:   Nausea   Atypical chest pain   Personal history of coronary artery disease   Unstable angina (H)       1/25/2020   [facility], [place], EMERGENCY DEPARTMENT     Elisha Jordan MD  01/25/20 0694

## 2020-01-25 NOTE — DISCHARGE INSTRUCTIONS
Going home after a Coronary Angiogram    PROCEDURE SITE:   Radial (Wrist)  It is normal to have soreness and small bruising at the puncture site as well as mild tingling in your hand for up to 3 days. You may shower but please do not use a hot tub, bath tub or pool for 2 days. Do not apply any lotion or powder near the site for 3 days. For 3 days do not use your affected hand/arm to support your weight (like rising up out of a chair) or lifting >5 pounds.    MEDICATIONS:   1. If you are on Metformin (Glucophage) or any medications that contain this, you should not take it for at least 48 hours (2 days) from the time of your procedure. If you have baseline kidney problems, you may be instructed to also have a lab test drawn in 2-3 days before getting the okay to restart it.  2. If you have not been continued on other medications you were previously taking at home it is probably for reason though please discuss your concerns with any of your doctors.     DIET:  We recommend a diet low in saturated fat, trans fat and cholesterol. In addition it will be helpful to be cautious of sodium intake and carbohydrates. Try to increase the amount of lean meats you eat like fish and chicken, but avoid frying; and reduce the amount of red meat you eat. Eat more fresh fruits and vegetables and try to avoid canned and processed food. Please reference the handouts you received for more specific information.      OTHER INFORMATION:  1. If you are a smoker, quitting smoking will be one of the most important things you can do for yourself. There are nicotine replacement options or medications they might be able to be prescribed. Please discuss this with your doctors. Consider calling the [employer] at 8-674-493-OPJE ([phone]) as they can offer ongoing support after discharge.    CALL YOUR DOCTOR IF:  -You have a large or growing lump/bump around the procedure site  -The site is red, swollen, hot, tender or has drainage  -You have hives, a  rash or unusual itching  -You have increasing or worsening shortness of breath or chest pain    FOLLOW UP:  We prefer you to follow up with your primary care provider within one week. You will be arranged to see the cardiologist (heart doctor) in clinic in about one month.     Should you need to contact us:  Cardiology clinic for scheduling or triage nurse questions/concerns:  [phone]

## 2020-01-25 NOTE — ED NOTES
Bed: ED02  Expected date:   Expected time:   Means of arrival:   Comments:  A 682  Luke Hoelsther  44/M  Epigastric discomfort, hypotensive SBP 80s, hx MIs  Red  ETA 0412

## 2020-01-25 NOTE — PROGRESS NOTES
DISCHARGE   Discharged to: Home  Via: Automobile  Accompanied by: Family  Discharge Instructions: diet, activity, medications, follow up appointments, when to call the MD, and what to watchout for (i.e. s/s of infection, increasing SOB, palpitations, chest pain,)  Prescriptions: none; medication list reviewed & sent with pt  Follow Up Appointments: arranged; information given  Belongings: All sent with pt  IV: out  Telemetry: off  Pt exhibits understanding of above discharge instructions; all questions answered.  Discharge Paperwork: faxed    Wrist site EWA koo

## 2020-01-25 NOTE — H&P
[facility]   Cardiology Service  History and Physical      Travon Livingston MRN# [id number]   YOB: 1975 Age: 44 year old     Admission Date: 1/25/2020      Assessment and plan:   Travon Livingston is a 44 y.o.M with a PMHx of CAD s/p PCI (mLAD, dLAD 2013, mRCA 2018) who is admitted with complaints of N/V/diaphoresis and epigastric pain    # Atypical chest pain  Patient reports woke up at 0300 with nausea, burning epigastric pain that does not radiate, but is associated with diaphoresis, identical to previous MI's.  While in ED, EKG with no ST-T changes, troponin neg x 1.  Given prior history, patient started on hep gtt and given 325 mg ASA.     - Admit to inpatient  - Coronary angiogram  - NPO for procedure  - Troponin x 2  - DAPT: Continue PTA 81 mg ASA/Brilinta 90 mg BID  - BB: Continue PTA Lopressor 12.5 mg BID  - Statin: Continue PTA Crestor 40 mg   - Check Lipid panel, A1C, TSH    FEN: NPO for Procedure  Code status: Full  Disposition: Possible discharge home this evening vs tomorrow pending angiogram/recovery    Patient seen and discussed with Dr. Orion Vann, APRN, CNP  [facility]  [phone]      Chief Complaint: chest pain, nausea    HPI:   Travon Livingston is a 44 y.o.M with a PMHx of CAD s/p PCI (mLAD, dLAD 2013, mRCA x 2 2018) who is admitted with complaints of N/V/diaphoresis and epigastric pain.    Patient reports this am around 0300, woke up with burning, throbbing epigastric pain and nausea.  This pain was associated with diaphoresis, did not radiate, and did not get worse with activity or rest.  Patient states this type of pain is identical to previous MI's in 2013 and 2018, so he came into ED.  Given his history, a heparin gtt was started while pt was in ED, he was also given a full asa.     While in ED, EKG with no ST-T changes, troponin negative x 1.  Of note, with previous MI's, patient also did not have a troponin leak or changes in EKG.   Most recent Echocardiogram, , EF 55-60%, no WMA.     Patient has remained on ASA/Brilinta since PCI in 2018, he reports he has been active with no complaints of chest pain, SOB, lightheadedness, or dizziness.  He is not a smoker.  He does have a strong family history of CAD.     At time of interview, patient is chest pain free. Denies any nausea, SOB, lightheadedness, dizziness.     Past Medical History:   Diagnosis Date     High cholesterol      Myocardial infarction (H)        Past Surgical History:   Procedure Laterality Date     coronary stints[  2013       No current facility-administered medications on file prior to encounter.   aspirin EC 81 MG EC tablet, Take 1 tablet by mouth daily.  lisinopril (PRINIVIL/ZESTRIL) 10 MG tablet, Take 1 tablet (10 mg) by mouth daily  metoprolol tartrate (LOPRESSOR) 25 MG tablet, Take 0.5 tablets (12.5 mg) by mouth 2 times daily  rosuvastatin (CRESTOR) 40 MG tablet, Take 1 tablet (40 mg) by mouth daily  ticagrelor (BRILINTA) 90 MG tablet, Take 1 tablet (90 mg) by mouth 2 times daily        Family History   Problem Relation Age of Onset     C.A.D. Father      Myocardial Infarction Father         45     Myocardial Infarction Paternal Uncle         45     Myocardial Infarction Paternal Uncle        Social History     Tobacco Use     Smoking status: Former Smoker     Packs/day: 1.00     Years: 15.00     Pack years: 15.00     Types: Cigarettes     Last attempt to quit: 2011     Years since quittin.4     Smokeless tobacco: Never Used   Substance Use Topics     Alcohol use: Yes     Comment: daily, 1 to 2 beers        No Known Allergies      ROS:   CONSTITUTIONAL:No report of fevers or chills  RESPIRATORY: No cough, wheezing, SOB, or hemoptysis  CARDIOVASCULAR: see HPI  MUSCULO-SKELETAL: No joint pain/swelling, no muscle pain  NEURO: No paresthesias, syncope, pre-syncope, lightheadness, dizziness or vertigo  ENDOCRINE: No temperature intolerance, no skin/hair  "changes  PSYCHIATRIC: No change in mood, feeling down/anxious, no change in sleep or appetite  GI: no melena or hematochezia, no change in bowel habits  : no hematuria or dysuria, no hesitancy, dribbling or incontinence  HEME: no easy bruising or bleeding, no history of anemia, no history of blood clots  SKIN: no rashes or sores, no unusual itching      Physical Examination:  Vitals: /72   Pulse 65   Temp 97.7  F (36.5  C) (Oral)   Resp 11   Ht 1.905 m (6' 3\")   Wt 113.4 kg (250 lb)   SpO2 97%   BMI 31.25 kg/m    BMI= Body mass index is 31.25 kg/m .    GENERAL APPEARANCE: healthy, alert and no distress  HEENT: no icterus, no xanthelasmas, normal pupil size and reaction, normal palate, mucosa moist  NECK: JVP flat, brisk carotid upstroke bilaterally  CHEST: lungs clear to auscultation without rales, rhonchi or wheezes, no use of accessory muscles, no retractions  CARDIOVASCULAR: regular rhythm, normal S1 and S2, no S3 or S4 and no murmur, click or rub.  ABDOMEN: soft, non tender, without hepatosplenomegaly, no masses palpable, bowel sounds normal  EXTREMITIES: warm, no edema, DP/PT pulses 2+ bilaterally, no clubbing or cyanosis   NEURO: alert and oriented to person/place/time, normal speech, gait and affect  SKIN: no ecchymoses, no rashes      Laboratory:  CMP  Recent Labs   Lab 01/25/20  0419      POTASSIUM 4.1   CHLORIDE 112*   CO2 25   ANIONGAP 4   *   BUN 11   CR 0.82   GFRESTIMATED >90   GFRESTBLACK >90   TIERA 8.1*   PROTTOTAL 6.6*   ALBUMIN 3.6   BILITOTAL 0.4   ALKPHOS 77   AST 27   ALT 34     CBC  Recent Labs   Lab 01/25/20  0419   WBC 6.0   RBC 4.52   HGB 14.1   HCT 41.0   MCV 91   MCH 31.2   MCHC 34.4   RDW 11.7          Lab Results   Component Value Date    TROPI <0.015 01/25/2020    TROPI <0.015 07/16/2018    TROPI <0.015 07/16/2018    TROPONIN 0.00 01/25/2020    TROPONIN 0.00 07/16/2018    TROPONIN 0.00 12/22/2014         EKG 1/25/2020: NSR HR 68      TTE " 7/17/2018:  Interpretation Summary  Global and regional left ventricular function is normal with an EF of 55-60%.  Right ventricular function, chamber size, wall motion, and thickness are  normal.  No significant valvular dysfunction noted.  The inferior vena cava was normal in size with preserved respiratory  variability.  No pericardial effusion present.     Compared to echocardiogram 5/24/18, there are no significant changes.    Coronary angiogram 7/16/18:

## 2020-01-25 NOTE — DISCHARGE SUMMARY
[address] [address]  p: [phone]    Discharge Summary: Cardiology Service    Travon Livingston MRN# [id number]   YOB: 1975 Age: 44 year old       Admission Date: 1/25/2020  Discharge Date: 01/25/20      Discharge Diagnoses:  1. Atypical chest pain  2. CAD s/p PCI (2103, 2018)    Pertinent Procedures:  1. Coronary Angiogram    Consults:  N/A    Imaging with results:  Coronary Angiogram 1/25/2020:  Pre Procedure Diagnosis   chest pain, history of PCI         Conclusion   Minimal non obstructive residual CAD with minimal ISR in prior RCA and LAD stents.       Coronary Findings   Diagnostic   Dominance: Left   Left Main   The vessel is large and is angiographically normal.   Left Anterior Descending   The vessel is large.   Prox LAD to Mid LAD lesion is 20% stenosed. The lesion was previously treated using a stent of unknown type.   Mid LAD lesion is 20% stenosed. The lesion was previously treated using a stent of unknown type.   First Diagonal Branch   The vessel is small and is angiographically normal.   Second Diagonal Branch   The vessel is small and is angiographically normal.   Third Diagonal Branch   The vessel is small and is angiographically normal.   Left Circumflex   The vessel is large and is angiographically normal.   First Obtuse Marginal Branch   The vessel is large and is angiographically normal.   Second Obtuse Marginal Branch   The vessel is large and is angiographically normal.   Left Posterior Descending Artery   The vessel is moderate in size and is angiographically normal.   First Left Posterolateral Branch   The vessel is moderate in size and is angiographically normal.   Second Left Posterolateral Branch   The vessel is moderate in size and is angiographically normal.   Left Posterior Atrioventricular Artery   The vessel is large and is angiographically normal.   Right Coronary Artery   The vessel is small.    Prox RCA to Mid RCA lesion is 20% stenosed. The lesion was previously treated using a stent of unknown type.     Intervention   No interventions have been documented.     Hemodynamics   Right Heart Pressures   LVEDP 10 mmHg Left ventricular filling pressures are normal .       Other imaging studies:  EKG 1/25/2020: NSR HR 68      Brief HPI:  Travon Livingston is a 44 y.o.M with a PMHx of CAD s/p PCI (mLAD, dLAD 2013, mRCA 2018) who is admitted with complaints of N/V/diaphoresis and epigastric pain    Hospital Course by Diagnosis:  # Atypical chest pain  Patient reports woke up at 0300 with nausea, burning epigastric pain that does not radiate, but is associated with diaphoresis, identical to previous MI's.  While in ED, EKG with no ST-T changes, troponin neg x 2.  Given prior history, patient started on hep gtt and given 325 mg ASA. Coronary angiogram with no acute lesions, stents patent.  Epigastric pain likely r/t viral GI, MSK, or anxiety     - DAPT: Continue PTA 81 mg ASA/Brilinta 90 mg BID  - BB: Continue PTA Lopressor 12.5 mg BID  - Statin: Continue PTA Crestor 40 mg   - Follow up with Cardiology ADRIAN in 3-6 weeks for chest pain follow up  - CR referral placed     Condition on discharge  Temp:  [97.7  F (36.5  C)-98.8  F (37.1  C)] 98.3  F (36.8  C)  Pulse:  [65-73] 65  Heart Rate:  [59-79] 61  Resp:  [6-18] 14  BP: (102-133)/(60-87) 105/67  SpO2:  [95 %-100 %] 95 %  General: Alert, interactive, NAD  Eyes: sclera anicteric, EOMI  Neck: JVP flat, carotid 2+ bilaterally  Cardiovascular: regular rate and rhythm, normal S1 and S2, no murmurs, gallops, or rubs  Resp: clear to auscultation bilaterally, no rales, wheezes, or rhonchi  GI: Soft, nontender, nondistended. +BS.  No HSM or masses, no rebound or guarding.  Extremities: no edema, no cyanosis or clubbing, dorsalis pedis and posterior tibialis pulses 2+ bilaterally  Skin: Warm and dry, no jaundice or rash; right radial access site CDI, soft, non-tender, no  bruising, no signs of hematoma, no bruit  Neuro: CN 2-12 intact, moves all extremities equally  Psych: Alert & oriented x 3    Medication Changes:  None    Discharge medications:   Current Discharge Medication List      CONTINUE these medications which have NOT CHANGED    Details   aspirin EC 81 MG EC tablet Take 1 tablet by mouth daily.    Associated Diagnoses: CAD (coronary artery disease)      lisinopril (PRINIVIL/ZESTRIL) 10 MG tablet Take 1 tablet (10 mg) by mouth daily  Qty: 90 tablet, Refills: 3    Associated Diagnoses: Coronary artery disease without angina pectoris, unspecified vessel or lesion type, unspecified whether native or transplanted heart; S/P angioplasty with stent      metoprolol tartrate (LOPRESSOR) 25 MG tablet Take 0.5 tablets (12.5 mg) by mouth 2 times daily  Qty: 135 tablet, Refills: 3    Associated Diagnoses: S/P angioplasty with stent; Coronary artery disease without angina pectoris, unspecified vessel or lesion type, unspecified whether native or transplanted heart      rosuvastatin (CRESTOR) 40 MG tablet Take 1 tablet (40 mg) by mouth daily  Qty: 90 tablet, Refills: 3    Associated Diagnoses: S/P angioplasty with stent; Coronary artery disease without angina pectoris, unspecified vessel or lesion type, unspecified whether native or transplanted heart      ticagrelor (BRILINTA) 90 MG tablet Take 1 tablet (90 mg) by mouth 2 times daily  Qty: 180 tablet, Refills: 3    Associated Diagnoses: CAD S/P percutaneous coronary angioplasty             Labs or imaging requiring follow-up after discharge:  N/A      Follow-up:  Follow up with PCP in 7-10 days for post hospitalization follow up  Follow up with Cardiology Clinic in 3-6 weeks for chest pain follow up    Code status:  Full    Patient Care Team:  Huseyin Noland PA-C as PCP - General (Physician Assistant)  Mary Ware as Clinic Care Coordinator (Cardiology)  Mehdi Gonzales MD as MD (Cardiology)  Huseyin Noland PA-C  as Assigned PCP    Ebony CHERY, CNP  [facility]  [phone]

## 2020-01-25 NOTE — ED TRIAGE NOTES
Pt awoke around 0300 w/ nausea, similar to that when he had M  I in past, NSR w/ possible peaked T-waves en route, hypotensive and diaphoretic and clammy skin en route as well. . AxOx4,  and 4mg IV zofran given by EMS. No nitroglycerin given as pt denies chest pain. 18g Right AC, 20g Left AC.

## 2020-01-25 NOTE — ED NOTES
"[facility], [place]   ED Nurse to Floor Handoff     Travon Livingston is a 44 year old male who speaks English and lives with family members,  in a home  They arrived in the ED by ambulance from home    ED Chief Complaint: Abdominal Pain and Nausea    ED Dx;   Final diagnoses:   Nausea   Atypical chest pain   Personal history of coronary artery disease   Unstable angina (H)         Needed?: No    Allergies: No Known Allergies.  Past Medical Hx:   Past Medical History:   Diagnosis Date     High cholesterol      Myocardial infarction (H)       Baseline Mental status: WDL  Current Mental Status changes: at basesline    Infection present or suspected this encounter: no  Sepsis suspected: No  Isolation type: No active isolations     Activity level - Baseline/Home:  Independent  Activity Level - Current:   Independent    Bariatric equipment needed?: No    In the ED these meds were given: Medications - No data to display    Drips running?  No    Home pump  No    Current LDAs  Peripheral IV 01/25/20 Right Upper arm (Active)   Site Assessment WDL 1/25/2020  4:25 AM   Number of days: 0       Labs results:   Labs Ordered and Resulted from Time of ED Arrival Up to the Time of Departure from the ED   COMPREHENSIVE METABOLIC PANEL - Abnormal; Notable for the following components:       Result Value    Chloride 112 (*)     Glucose 114 (*)     Calcium 8.1 (*)     Protein Total 6.6 (*)     All other components within normal limits   CBC WITH PLATELETS DIFFERENTIAL   TROPONIN I   LIPASE   INR   PERIPHERAL IV CATHETER   TROPONIN POCT       Imaging Studies:   Recent Results (from the past 24 hour(s))   Chest XR,  PA & LAT    Narrative    This is a preliminary resident report. Full report will follow.      Impression    Impression:   No acute cardiopulmonary abnormalities.       Recent vital signs:   /60   Pulse 68   Temp 97.7  F (36.5  C) (Oral)   Resp 16   Ht 1.905 m (6' 3\")   Wt 113.4 kg " (250 lb)   SpO2 98%   BMI 31.25 kg/m      [place] Coma Scale Score: 15 (01/25/20 3157)       Cardiac Rhythm: Normal Sinus  Pt needs tele? Yes  Skin/wound Issues: None    Code Status: Full Code    Pain control: pt had none    Nausea control: good    Abnormal labs/tests/findings requiring intervention: see epic    Family present during ED course? No   Family Comments/Social Situation comments: Pt has history of similar presentations to the ER resulting in going to the cath lab and having significant blockages.     Tasks needing completion: None    Tabatha Fluhrer, RN    [phone] [facility]

## 2020-01-26 ENCOUNTER — PATIENT OUTREACH (OUTPATIENT)
Dept: CARE COORDINATION | Facility: CLINIC | Age: 45
End: 2020-01-26

## 2020-01-28 NOTE — TELEPHONE ENCOUNTER
Patient was called three times and no answer so post 24 hr DC follow up calls will be closed out    Delores Durand CMA   Post Hospital Discharge Team

## 2020-03-22 ENCOUNTER — TELEPHONE (OUTPATIENT)
Dept: FAMILY MEDICINE | Facility: CLINIC | Age: 45
End: 2020-03-22

## 2020-03-22 NOTE — TELEPHONE ENCOUNTER
Forms received from [facility]: physician collaboration for Josias Noland PA-C.  Forms placed in provider 'sign me' folder.  Please fax forms to [phone] after completion.    Scar Dallas

## 2020-05-30 NOTE — TELEPHONE ENCOUNTER
Message from [employer]:  Original authorizing provider: Mehdi Gonzales MD    Travon Livingston would like a refill of the following medications:  rosuvastatin (CRESTOR) 40 MG tablet [Mehdi Gonzales MD]    Preferred pharmacy: [facility] [id number] [facility] - [place] - [address]    Comment:  I ran out last week, I thought I had another refill. I just set up my annual exam with my primary care doctor and I'm going to try to set up an appointment with [facility] for the Cardio follow up. This Pharmacy is my preferred location. [facility] [address] [place] [address] Contact number [phone] Store #[phone] Thanks!   Female

## 2020-06-12 ENCOUNTER — TELEPHONE (OUTPATIENT)
Dept: FAMILY MEDICINE | Facility: CLINIC | Age: 45
End: 2020-06-12

## 2020-06-12 DIAGNOSIS — Z95.820 S/P ANGIOPLASTY WITH STENT: ICD-10-CM

## 2020-06-12 DIAGNOSIS — I25.10 CORONARY ARTERY DISEASE WITHOUT ANGINA PECTORIS, UNSPECIFIED VESSEL OR LESION TYPE, UNSPECIFIED WHETHER NATIVE OR TRANSPLANTED HEART: ICD-10-CM

## 2020-06-12 RX ORDER — ROSUVASTATIN CALCIUM 40 MG/1
40 TABLET, COATED ORAL DAILY
Qty: 30 TABLET | Refills: 0 | Status: SHIPPED | OUTPATIENT
Start: 2020-06-12 | End: 2020-06-16

## 2020-06-12 NOTE — TELEPHONE ENCOUNTER
Patient contacted. Apt scheduled.    Thank you,  Miriam KRISHNAMURTHY  [facility]  Team [place] Coordinator

## 2020-06-12 NOTE — TELEPHONE ENCOUNTER
Flagging for TC/MA to please reach out to patient.    Prescription approved per [facility] Refill Protocol for 30 day fill only.  Patient needs an appointment with PCP    Diana Del Rosario RN

## 2020-06-14 DIAGNOSIS — I25.10 CORONARY ARTERY DISEASE WITHOUT ANGINA PECTORIS, UNSPECIFIED VESSEL OR LESION TYPE, UNSPECIFIED WHETHER NATIVE OR TRANSPLANTED HEART: ICD-10-CM

## 2020-06-14 DIAGNOSIS — Z95.820 S/P ANGIOPLASTY WITH STENT: ICD-10-CM

## 2020-06-16 ENCOUNTER — VIRTUAL VISIT (OUTPATIENT)
Dept: FAMILY MEDICINE | Facility: CLINIC | Age: 45
End: 2020-06-16
Payer: COMMERCIAL

## 2020-06-16 DIAGNOSIS — E78.5 HYPERLIPIDEMIA LDL GOAL <70: Primary | ICD-10-CM

## 2020-06-16 DIAGNOSIS — I25.10 CORONARY ARTERY DISEASE WITHOUT ANGINA PECTORIS, UNSPECIFIED VESSEL OR LESION TYPE, UNSPECIFIED WHETHER NATIVE OR TRANSPLANTED HEART: ICD-10-CM

## 2020-06-16 DIAGNOSIS — Z95.820 S/P ANGIOPLASTY WITH STENT: ICD-10-CM

## 2020-06-16 PROCEDURE — 99213 OFFICE O/P EST LOW 20 MIN: CPT | Mod: 95 | Performed by: PHYSICIAN ASSISTANT

## 2020-06-16 RX ORDER — ROSUVASTATIN CALCIUM 40 MG/1
40 TABLET, COATED ORAL DAILY
Qty: 90 TABLET | Refills: 1 | Status: SHIPPED | OUTPATIENT
Start: 2020-06-16 | End: 2021-03-09

## 2020-06-16 RX ORDER — METOPROLOL TARTRATE 25 MG/1
12.5 TABLET, FILM COATED ORAL 2 TIMES DAILY
Qty: 90 TABLET | Refills: 0 | Status: SHIPPED | OUTPATIENT
Start: 2020-06-16 | End: 2020-09-18

## 2020-06-16 NOTE — PROGRESS NOTES
"Travon Livingston is a 44 year old male who is being evaluated via a billable telephone visit.      The patient has been notified of following:     \"This telephone visit will be conducted via a call between you and your physician/provider. We have found that certain health care needs can be provided without the need for a physical exam.  This service lets us provide the care you need with a short phone conversation.  If a prescription is necessary we can send it directly to your pharmacy.  If lab work is needed we can place an order for that and you can then stop by our lab to have the test done at a later time.    Telephone visits are billed at different rates depending on your insurance coverage. During this emergency period, for some insurers they may be billed the same as an in-person visit.  Please reach out to your insurance provider with any questions.    If during the course of the call the physician/provider feels a telephone visit is not appropriate, you will not be charged for this service.\"    Patient has given verbal consent for Telephone visit?  Yes    What phone number would you like to be contacted at? [phone]    How would you like to obtain your AVS? Anaya Osuna     Travon Livingston is a 44 year old male who presents via phone visit today for the following health issues:    HPI  Hyperlipidemia Follow-Up      Are you regularly taking any medication or supplement to lower your cholesterol?   Yes- Rosuvastatin    Are you having muscle aches or other side effects that you think could be caused by your cholesterol lowering medication?  Not sure, patient says he has been having some muscle aches    Hypertension Follow-up      Do you check your blood pressure regularly outside of the clinic? No     Are you following a low salt diet? Yes    Are your blood pressures ever more than 140 on the top number (systolic) OR more   than 90 on the bottom number (diastolic), for example 140/90? No      How many " servings of fruits and vegetables do you eat daily?  2-3    On average, how many sweetened beverages do you drink each day (Examples: soda, juice, sweet tea, etc.  Do NOT count diet or artificially sweetened beverages)?   0    How many days per week do you exercise enough to make your heart beat faster? 3 or less    How many minutes a day do you exercise enough to make your heart beat faster? 30 - 60    How many days per week do you miss taking your medication? On occasion.     Getting aches and pains from Crestor but otherwise no concerns.    Patient Active Problem List   Diagnosis     Atypical chest pain     Hyperlipidemia LDL goal <70     CAD S/P percutaneous coronary angioplasty     Epigastric pain      Current Outpatient Medications   Medication     aspirin EC 81 MG EC tablet     lisinopril (ZESTRIL) 10 MG tablet     metoprolol tartrate (LOPRESSOR) 25 MG tablet     rosuvastatin (CRESTOR) 40 MG tablet     No current facility-administered medications for this visit.          Patient Active Problem List   Diagnosis     Atypical chest pain     Hyperlipidemia LDL goal <70     CAD S/P percutaneous coronary angioplasty     Epigastric pain     Past Surgical History:   Procedure Laterality Date     coronary stints[  2013     CV CORONARY ANGIOGRAM  2020    Procedure: CV CORONARY ANGIOGRAM;  Surgeon: Giovanni Sears MD;  Location:  HEART CARDIAC CATH LAB     CV LEFT HEART CATH N/A 2020    Procedure: Left Heart Cath;  Surgeon: Giovanni Sears MD;  Location: [facility] CARDIAC CATH LAB       Social History     Tobacco Use     Smoking status: Former Smoker     Packs/day: 1.00     Years: 15.00     Pack years: 15.00     Types: Cigarettes     Last attempt to quit: 2011     Years since quittin.8     Smokeless tobacco: Never Used   Substance Use Topics     Alcohol use: Yes     Comment: daily, 1 to 2 beers      Family History   Problem Relation Age of Onset     C.A.D. Father       Myocardial Infarction Father         45     Myocardial Infarction Paternal Uncle         45     Myocardial Infarction Paternal Uncle            Reviewed and updated as needed this visit by Provider         Review of Systems   Constitutional, HEENT, cardiovascular, pulmonary, gi and gu systems are negative, except as otherwise noted.       Objective   Reported vitals:  There were no vitals taken for this visit.   healthy, alert and no distress  PSYCH: Alert and oriented times 3; coherent speech, normal   rate and volume, able to articulate logical thoughts, able   to abstract reason, no tangential thoughts, no hallucinations   or delusions  His affect is normal  RESP: No cough, no audible wheezing, able to talk in full sentences  Remainder of exam unable to be completed due to telephone visits    Diagnostic Test Results:  Labs reviewed in Epic        Assessment/Plan:  1. S/P angioplasty with stent  Asymptomatic. Mild arthralgias from the Crestor. Can try Co-Q10 200 mg daily or consider halving the dose. Let me know how things go  - rosuvastatin (CRESTOR) 40 MG tablet; Take 1 tablet (40 mg) by mouth daily  Dispense: 90 tablet; Refill: 1    2. Coronary artery disease without angina pectoris, unspecified vessel or lesion type, unspecified whether native or transplanted heart  Asymptomatic.   - rosuvastatin (CRESTOR) 40 MG tablet; Take 1 tablet (40 mg) by mouth daily  Dispense: 90 tablet; Refill: 1    No follow-ups on file.      Phone call duration:  5 minutes    LISSY Gomez, PA-C

## 2020-11-22 ENCOUNTER — HEALTH MAINTENANCE LETTER (OUTPATIENT)
Age: 45
End: 2020-11-22

## 2021-02-02 ENCOUNTER — HOSPITAL ENCOUNTER (OUTPATIENT)
Facility: CLINIC | Age: 46
Setting detail: OBSERVATION
Discharge: HOME | End: 2021-02-03
Attending: EMERGENCY MEDICINE | Admitting: EMERGENCY MEDICINE
Payer: COMMERCIAL

## 2021-02-02 ENCOUNTER — APPOINTMENT (OUTPATIENT)
Dept: GENERAL RADIOLOGY | Facility: CLINIC | Age: 46
End: 2021-02-02
Attending: EMERGENCY MEDICINE
Payer: COMMERCIAL

## 2021-02-02 DIAGNOSIS — R11.0 NAUSEA: ICD-10-CM

## 2021-02-02 DIAGNOSIS — I10 ESSENTIAL HYPERTENSION, BENIGN: ICD-10-CM

## 2021-02-02 DIAGNOSIS — E78.5 HYPERLIPIDEMIA, UNSPECIFIED HYPERLIPIDEMIA TYPE: ICD-10-CM

## 2021-02-02 DIAGNOSIS — R07.9 CHEST PAIN, UNSPECIFIED TYPE: ICD-10-CM

## 2021-02-02 DIAGNOSIS — Z20.822 CONTACT WITH AND (SUSPECTED) EXPOSURE TO COVID-19: ICD-10-CM

## 2021-02-02 LAB
ALBUMIN SERPL-MCNC: 4.2 G/DL (ref 3.4–5)
ALP SERPL-CCNC: 101 U/L (ref 40–150)
ALT SERPL W P-5'-P-CCNC: 28 U/L (ref 0–70)
ANION GAP SERPL CALCULATED.3IONS-SCNC: 5 MMOL/L (ref 3–14)
AST SERPL W P-5'-P-CCNC: 19 U/L (ref 0–45)
BASOPHILS # BLD AUTO: 0.1 10E9/L (ref 0–0.2)
BASOPHILS NFR BLD AUTO: 1 %
BILIRUB SERPL-MCNC: 0.3 MG/DL (ref 0.2–1.3)
BUN SERPL-MCNC: 11 MG/DL (ref 7–30)
CALCIUM SERPL-MCNC: 9.1 MG/DL (ref 8.5–10.1)
CHLORIDE SERPL-SCNC: 106 MMOL/L (ref 94–109)
CO2 SERPL-SCNC: 26 MMOL/L (ref 20–32)
CREAT SERPL-MCNC: 0.89 MG/DL (ref 0.66–1.25)
DIFFERENTIAL METHOD BLD: NORMAL
EOSINOPHIL # BLD AUTO: 0.1 10E9/L (ref 0–0.7)
EOSINOPHIL NFR BLD AUTO: 1.9 %
ERYTHROCYTE [DISTWIDTH] IN BLOOD BY AUTOMATED COUNT: 11.9 % (ref 10–15)
GFR SERPL CREATININE-BSD FRML MDRD: >90 ML/MIN/{1.73_M2}
GLUCOSE SERPL-MCNC: 102 MG/DL (ref 70–99)
HCT VFR BLD AUTO: 46.8 % (ref 40–53)
HGB BLD-MCNC: 16.1 G/DL (ref 13.3–17.7)
IMM GRANULOCYTES # BLD: 0 10E9/L (ref 0–0.4)
IMM GRANULOCYTES NFR BLD: 0.3 %
LABORATORY COMMENT REPORT: NORMAL
LIPASE SERPL-CCNC: 147 U/L (ref 73–393)
LYMPHOCYTES # BLD AUTO: 2 10E9/L (ref 0.8–5.3)
LYMPHOCYTES NFR BLD AUTO: 28.9 %
MCH RBC QN AUTO: 31 PG (ref 26.5–33)
MCHC RBC AUTO-ENTMCNC: 34.4 G/DL (ref 31.5–36.5)
MCV RBC AUTO: 90 FL (ref 78–100)
MONOCYTES # BLD AUTO: 0.6 10E9/L (ref 0–1.3)
MONOCYTES NFR BLD AUTO: 9.3 %
NEUTROPHILS # BLD AUTO: 4 10E9/L (ref 1.6–8.3)
NEUTROPHILS NFR BLD AUTO: 58.6 %
NRBC # BLD AUTO: 0 10*3/UL
NRBC BLD AUTO-RTO: 0 /100
PLATELET # BLD AUTO: 320 10E9/L (ref 150–450)
POTASSIUM SERPL-SCNC: 3.6 MMOL/L (ref 3.4–5.3)
PROT SERPL-MCNC: 7.5 G/DL (ref 6.8–8.8)
RBC # BLD AUTO: 5.19 10E12/L (ref 4.4–5.9)
SARS-COV-2 RNA RESP QL NAA+PROBE: NEGATIVE
SODIUM SERPL-SCNC: 137 MMOL/L (ref 133–144)
SPECIMEN SOURCE: NORMAL
TROPONIN I SERPL-MCNC: <0.015 UG/L (ref 0–0.04)
WBC # BLD AUTO: 6.9 10E9/L (ref 4–11)

## 2021-02-02 PROCEDURE — 99220 PR INITIAL OBSERVATION CARE,LEVEL III: CPT | Mod: 25 | Performed by: EMERGENCY MEDICINE

## 2021-02-02 PROCEDURE — C9803 HOPD COVID-19 SPEC COLLECT: HCPCS | Performed by: EMERGENCY MEDICINE

## 2021-02-02 PROCEDURE — 99285 EMERGENCY DEPT VISIT HI MDM: CPT | Mod: 25 | Performed by: EMERGENCY MEDICINE

## 2021-02-02 PROCEDURE — U0005 INFEC AGEN DETEC AMPLI PROBE: HCPCS | Performed by: EMERGENCY MEDICINE

## 2021-02-02 PROCEDURE — G0378 HOSPITAL OBSERVATION PER HR: HCPCS

## 2021-02-02 PROCEDURE — U0003 INFECTIOUS AGENT DETECTION BY NUCLEIC ACID (DNA OR RNA); SEVERE ACUTE RESPIRATORY SYNDROME CORONAVIRUS 2 (SARS-COV-2) (CORONAVIRUS DISEASE [COVID-19]), AMPLIFIED PROBE TECHNIQUE, MAKING USE OF HIGH THROUGHPUT TECHNOLOGIES AS DESCRIBED BY CMS-2020-01-R: HCPCS | Performed by: EMERGENCY MEDICINE

## 2021-02-02 PROCEDURE — 80053 COMPREHEN METABOLIC PANEL: CPT | Performed by: EMERGENCY MEDICINE

## 2021-02-02 PROCEDURE — 93010 ELECTROCARDIOGRAM REPORT: CPT | Performed by: EMERGENCY MEDICINE

## 2021-02-02 PROCEDURE — 71046 X-RAY EXAM CHEST 2 VIEWS: CPT | Mod: 26 | Performed by: RADIOLOGY

## 2021-02-02 PROCEDURE — 71046 X-RAY EXAM CHEST 2 VIEWS: CPT

## 2021-02-02 PROCEDURE — 84484 ASSAY OF TROPONIN QUANT: CPT | Performed by: EMERGENCY MEDICINE

## 2021-02-02 PROCEDURE — 250N000013 HC RX MED GY IP 250 OP 250 PS 637: Performed by: EMERGENCY MEDICINE

## 2021-02-02 PROCEDURE — 93005 ELECTROCARDIOGRAM TRACING: CPT | Performed by: EMERGENCY MEDICINE

## 2021-02-02 PROCEDURE — 36415 COLL VENOUS BLD VENIPUNCTURE: CPT | Performed by: NURSE PRACTITIONER

## 2021-02-02 PROCEDURE — 85025 COMPLETE CBC W/AUTO DIFF WBC: CPT | Performed by: EMERGENCY MEDICINE

## 2021-02-02 PROCEDURE — 83690 ASSAY OF LIPASE: CPT | Performed by: EMERGENCY MEDICINE

## 2021-02-02 PROCEDURE — 84484 ASSAY OF TROPONIN QUANT: CPT | Performed by: NURSE PRACTITIONER

## 2021-02-02 RX ORDER — ASPIRIN 81 MG/1
162 TABLET, CHEWABLE ORAL ONCE
Status: DISCONTINUED | OUTPATIENT
Start: 2021-02-02 | End: 2021-02-03 | Stop reason: HOSPADM

## 2021-02-02 RX ORDER — ACETAMINOPHEN 650 MG/1
650 SUPPOSITORY RECTAL EVERY 4 HOURS PRN
Status: DISCONTINUED | OUTPATIENT
Start: 2021-02-02 | End: 2021-02-03 | Stop reason: HOSPADM

## 2021-02-02 RX ORDER — ROSUVASTATIN CALCIUM 10 MG/1
40 TABLET, COATED ORAL DAILY
Status: DISCONTINUED | OUTPATIENT
Start: 2021-02-03 | End: 2021-02-03 | Stop reason: HOSPADM

## 2021-02-02 RX ORDER — ASPIRIN 81 MG/1
81 TABLET ORAL DAILY
Status: DISCONTINUED | OUTPATIENT
Start: 2021-02-03 | End: 2021-02-03 | Stop reason: HOSPADM

## 2021-02-02 RX ORDER — LIDOCAINE 40 MG/G
CREAM TOPICAL
Status: DISCONTINUED | OUTPATIENT
Start: 2021-02-02 | End: 2021-02-03 | Stop reason: HOSPADM

## 2021-02-02 RX ORDER — MAGNESIUM HYDROXIDE/ALUMINUM HYDROXICE/SIMETHICONE 120; 1200; 1200 MG/30ML; MG/30ML; MG/30ML
30 SUSPENSION ORAL EVERY 4 HOURS PRN
Status: DISCONTINUED | OUTPATIENT
Start: 2021-02-02 | End: 2021-02-03 | Stop reason: HOSPADM

## 2021-02-02 RX ORDER — LISINOPRIL 5 MG/1
10 TABLET ORAL DAILY
Status: DISCONTINUED | OUTPATIENT
Start: 2021-02-03 | End: 2021-02-03 | Stop reason: HOSPADM

## 2021-02-02 RX ORDER — ACETAMINOPHEN 325 MG/1
650 TABLET ORAL EVERY 4 HOURS PRN
Status: DISCONTINUED | OUTPATIENT
Start: 2021-02-02 | End: 2021-02-03 | Stop reason: HOSPADM

## 2021-02-02 RX ORDER — NITROGLYCERIN 0.4 MG/1
0.4 TABLET SUBLINGUAL EVERY 5 MIN PRN
Status: DISCONTINUED | OUTPATIENT
Start: 2021-02-02 | End: 2021-02-03 | Stop reason: HOSPADM

## 2021-02-02 RX ADMIN — METOPROLOL TARTRATE 12.5 MG: 25 TABLET ORAL at 21:01

## 2021-02-02 ASSESSMENT — ENCOUNTER SYMPTOMS
DIAPHORESIS: 1
SHORTNESS OF BREATH: 0
FEVER: 0
VOMITING: 0
NAUSEA: 1

## 2021-02-02 ASSESSMENT — MIFFLIN-ST. JEOR: SCORE: 2114.62

## 2021-02-03 ENCOUNTER — APPOINTMENT (OUTPATIENT)
Dept: NUCLEAR MEDICINE | Facility: CLINIC | Age: 46
End: 2021-02-03
Attending: NURSE PRACTITIONER
Payer: COMMERCIAL

## 2021-02-03 VITALS
RESPIRATION RATE: 18 BRPM | WEIGHT: 250 LBS | HEIGHT: 75 IN | TEMPERATURE: 98.4 F | HEART RATE: 59 BPM | DIASTOLIC BLOOD PRESSURE: 64 MMHG | BODY MASS INDEX: 31.08 KG/M2 | SYSTOLIC BLOOD PRESSURE: 98 MMHG | OXYGEN SATURATION: 98 %

## 2021-02-03 LAB
CV STRESS MAX HR HE: 90
INTERPRETATION ECG - MUSE: NORMAL
INTERPRETATION ECG - MUSE: NORMAL
RATE PRESSURE PRODUCT: NORMAL
STRESS ECHO BASELINE DIASTOLIC HE: 77
STRESS ECHO BASELINE HR: 68
STRESS ECHO BASELINE SYSTOLIC BP: 121
STRESS ECHO CALCULATED PERCENT HR: 51 %
STRESS ECHO LAST STRESS DIASTOLIC BP: 76
STRESS ECHO LAST STRESS SYSTOLIC BP: 124
STRESS ECHO TARGET HR: 177
TROPONIN I SERPL-MCNC: <0.015 UG/L (ref 0–0.04)
TROPONIN I SERPL-MCNC: <0.015 UG/L (ref 0–0.04)

## 2021-02-03 PROCEDURE — 343N000001 HC RX 343: Performed by: EMERGENCY MEDICINE

## 2021-02-03 PROCEDURE — 36415 COLL VENOUS BLD VENIPUNCTURE: CPT | Performed by: NURSE PRACTITIONER

## 2021-02-03 PROCEDURE — 78452 HT MUSCLE IMAGE SPECT MULT: CPT

## 2021-02-03 PROCEDURE — 250N000013 HC RX MED GY IP 250 OP 250 PS 637: Performed by: NURSE PRACTITIONER

## 2021-02-03 PROCEDURE — 84484 ASSAY OF TROPONIN QUANT: CPT | Performed by: NURSE PRACTITIONER

## 2021-02-03 PROCEDURE — 93016 CV STRESS TEST SUPVJ ONLY: CPT | Performed by: STUDENT IN AN ORGANIZED HEALTH CARE EDUCATION/TRAINING PROGRAM

## 2021-02-03 PROCEDURE — 78452 HT MUSCLE IMAGE SPECT MULT: CPT | Mod: 26 | Performed by: INTERNAL MEDICINE

## 2021-02-03 PROCEDURE — 93018 CV STRESS TEST I&R ONLY: CPT | Performed by: INTERNAL MEDICINE

## 2021-02-03 PROCEDURE — G0378 HOSPITAL OBSERVATION PER HR: HCPCS

## 2021-02-03 PROCEDURE — A9502 TC99M TETROFOSMIN: HCPCS | Performed by: EMERGENCY MEDICINE

## 2021-02-03 PROCEDURE — 99217 PR OBSERVATION CARE DISCHARGE: CPT | Performed by: EMERGENCY MEDICINE

## 2021-02-03 PROCEDURE — 250N000011 HC RX IP 250 OP 636: Performed by: STUDENT IN AN ORGANIZED HEALTH CARE EDUCATION/TRAINING PROGRAM

## 2021-02-03 PROCEDURE — 93017 CV STRESS TEST TRACING ONLY: CPT

## 2021-02-03 RX ORDER — CAFFEINE CITRATE 20 MG/ML
60 SOLUTION INTRAVENOUS
Status: DISCONTINUED | OUTPATIENT
Start: 2021-02-03 | End: 2021-02-03 | Stop reason: HOSPADM

## 2021-02-03 RX ORDER — ACYCLOVIR 200 MG/1
0-1 CAPSULE ORAL
Status: DISCONTINUED | OUTPATIENT
Start: 2021-02-03 | End: 2021-02-03 | Stop reason: HOSPADM

## 2021-02-03 RX ORDER — AMINOPHYLLINE 25 MG/ML
50-100 INJECTION, SOLUTION INTRAVENOUS
Status: DISCONTINUED | OUTPATIENT
Start: 2021-02-03 | End: 2021-02-03 | Stop reason: HOSPADM

## 2021-02-03 RX ORDER — ALBUTEROL SULFATE 90 UG/1
2 AEROSOL, METERED RESPIRATORY (INHALATION) EVERY 5 MIN PRN
Status: DISCONTINUED | OUTPATIENT
Start: 2021-02-03 | End: 2021-02-03 | Stop reason: HOSPADM

## 2021-02-03 RX ORDER — REGADENOSON 0.08 MG/ML
0.4 INJECTION, SOLUTION INTRAVENOUS ONCE
Status: COMPLETED | OUTPATIENT
Start: 2021-02-03 | End: 2021-02-03

## 2021-02-03 RX ADMIN — ASPIRIN 81 MG: 81 TABLET, COATED ORAL at 08:09

## 2021-02-03 RX ADMIN — LISINOPRIL 10 MG: 5 TABLET ORAL at 08:10

## 2021-02-03 RX ADMIN — TETROFOSMIN 36.1 MCI.: 1.38 INJECTION, POWDER, LYOPHILIZED, FOR SOLUTION INTRAVENOUS at 09:45

## 2021-02-03 RX ADMIN — ROSUVASTATIN CALCIUM 40 MG: 10 TABLET, FILM COATED ORAL at 08:09

## 2021-02-03 RX ADMIN — REGADENOSON 0.4 MG: 0.08 INJECTION, SOLUTION INTRAVENOUS at 09:44

## 2021-02-03 RX ADMIN — TETROFOSMIN 10.2 MCI.: 1.38 INJECTION, POWDER, LYOPHILIZED, FOR SOLUTION INTRAVENOUS at 09:16

## 2021-02-03 NOTE — PLAN OF CARE
"Outpatient/Observation goals to be met before discharge home:     - Serial troponins and stress test complete. - Not Met, trops neg x3, plan for NM stress test in the morning - originally schedule at 12:30 pm, move up to 8:30 am  - Seen and cleared by consultant if applicable -Not Met  - Adequate pain control on oral analgesia -Met, denies pain  - Vital signs normal or at patient baseline -Met  /75 (BP Location: Right arm)   Pulse 70   Temp 98.2  F (36.8  C) (Oral)   Resp 16   Ht 1.905 m (6' 3\")   Wt 113.4 kg (250 lb)   SpO2 100%   BMI 31.25 kg/m    - Safe disposition plan has been identified -Met, prior living once cleared for discharge     -pt A&O, denies chest pain and SOB, voiding spontaneously, up independently, VSS on RA   "

## 2021-02-03 NOTE — DISCHARGE SUMMARY
"Discharge Summary    Travon Livingston MRN# [id number]   YOB: 1975 Age: 45 year old     Date of Admission:  2/2/2021  Date of Discharge:  2/3/2021  Admitting Physician:  Ezequiel Floyd MD  Discharge Physician:  BENNY ALDRICH  Discharging Service:  Emergency Department Observation Unit     Primary Provider: Huseyin Noland          Discharge Diagnosis:   Chest pain, resolved              Discharge Disposition:   Discharged to home           Condition on Discharge:   Discharge condition: Stable   Code status on discharge: Full Code           Procedures:   Cardiology procedures perfromed:   Nuclear stress testing             Discharge Medications:     Current Discharge Medication List      CONTINUE these medications which have NOT CHANGED    Details   aspirin EC 81 MG EC tablet Take 1 tablet by mouth daily.    Associated Diagnoses: CAD (coronary artery disease)      lisinopril (ZESTRIL) 10 MG tablet TAKE 1 TABLET BY MOUTH EVERY DAY  Qty: 90 tablet, Refills: 0    Associated Diagnoses: Coronary artery disease without angina pectoris, unspecified vessel or lesion type, unspecified whether native or transplanted heart; S/P angioplasty with stent      metoprolol tartrate (LOPRESSOR) 25 MG tablet Take 0.5 tablets (12.5 mg) by mouth 2 times daily  Qty: 90 tablet, Refills: 0    Associated Diagnoses: S/P angioplasty with stent; Coronary artery disease without angina pectoris, unspecified vessel or lesion type, unspecified whether native or transplanted heart      rosuvastatin (CRESTOR) 40 MG tablet Take 1 tablet (40 mg) by mouth daily  Qty: 90 tablet, Refills: 1    Associated Diagnoses: S/P angioplasty with stent; Coronary artery disease without angina pectoris, unspecified vessel or lesion type, unspecified whether native or transplanted heart                   Consultations:   No consultations were requested during this admission             Brief History of Illness:   Per ED, \"Travon Livingston is a " "45 year old male with a medical history significant for CAD s/p PCI (2013 and 2018) and hyperlipidemia who presents to the Emergency Department for evaluation of chest pain and nausea.  Patient reports that his nausea started on 1/27/2021 (6 days ago).  Patient reports that he did not have any other symptoms at that time so he thought he had just eaten something that upset his stomach.  Patient reports that his nausea has been ongoing over the past week.  Patient reports that he went home after lunch today as he was not feeling well.  This evening, he was watching a horror movie and his heart began pounding.  Patient states that shortly afterwards he began having burning chest pain that has persisted.  Patient continues to have chest pain here in the Emergency Department and he still has ongoing nausea.  Patient denies any episodes of vomiting.  He does note that he had some mild diaphoresis earlier this afternoon, but this has since resolved.  Patient denies any associated shortness of breath and he denies any recent fevers.  Patient reports that he did take an aspirin 81 mg prior to arrival, but he did not take any nitroglycerin.  Patient reports that he is on a daily aspirin 81 mg, but he denies being on any anticoagulation medications.  Patient is also on metoprolol and he has been taking this as prescribed, but he does note that he recently ran out of his Crestor.     Per review of patient's chart, patient was last hospitalized here on 1/25/2020 at which time he presented to the Emergency Department with nausea, diaphoresis and burning epigastric pain.  At that time, troponin x2 was negative and EKG did not show any acute ischemic changes.  Patient underwent a coronary angiogram that did not show any acute lesions and showed that the stents were patent.\"     In the ED, /94, HR 90, temp 98.5, RR 16, SPO2 99% on RA. Labs show Na 137, K+ 3.6, BUN 11, Cr 0.89. Troponin negative. Lipase 147. WBC 6.9, Hgb 16.1, " "hematocrit 46.8. EKG shows NSR. Chest Xray with no acute cardiopulmonary findings. Covid negative. Medications: Patient was given lopressor 12.5mg PO x1 in the ED. Plan: Admit to ED Observation for acute coronary syndrome rule out.      On arrival in ED Observation, the pt was stable.      ED observation course:   ##Chest pain  ##HTN:  ##HLD:   45 yr old male with history of CAD s/p PCI with 4 stents in RCA and LAD, who presents for evaluation of chest pain. He has had nausea for nearly a week with chest pain and diaphoresis that started today. No shortness of breath, no fevers. Last echo 7/2018 was normal with EF 55-60%. Pt underwent coronary angiography when he presented with similar symptoms 1/2020 and was found to have minimal in-stent restenosis. HEART Score:3. Case was discussed with cardiology fellow Mohsan Chaudry who recommends Lexiscan given recent administration of beta blocker. Patient underwent NM Lexiscan this morning and this showed no notable ischemia. Serial troponin is negative. No events on cardiac telemetry overnight. Patient HD stable. Chest pain resolved at the time of his discharge. Advised to follow up with his cardiologist in the next few weeks. Patient agreeable.   -Continue PTA lisinopril, rosuvastatin, ASA 81mg                     Final Day of Progress before Discharge:       Physical Exam:  Blood pressure 98/64, pulse 59, temperature 98.4  F (36.9  C), temperature source Oral, resp. rate 18, height 1.905 m (6' 3\"), weight 113.4 kg (250 lb), SpO2 98 %.    EXAM:  Physical Exam   Constitutional: Pt is oriented to person, place, and time.Pt appears well-developed and well-nourished.   HENT:   Head: Normocephalic and atraumatic.   Eyes: Conjunctivae are normal. Pupils are equal, round, and reactive to light.   Neck: Normal range of motion. Neck supple.   Cardiovascular: Normal rate, regular rhythm, normal heart sounds and intact distal pulses.    Pulmonary/Chest: Effort normal and breath " sounds normal. No respiratory distress. Pt has no wheezes. Pt has no rales  Abdominal: Soft. Bowel sounds are normal. Pt exhibits no distension and no mass. No tenderness. Pt has no rebound and no guarding.   Musculoskeletal: Normal range of motion. Pt exhibits no edema.   Neurological: Pt is alert and oriented to person, place, and time. Normal reflexes.   Skin: Skin is warm and dry. No rash noted.   Psychiatric: Pt has a normal mood and affect. Behavior is normal. Judgment and thought content normal.             Data:  All laboratory data reviewed             Significant Results:   None  Results for orders placed or performed during the hospital encounter of 02/02/21   XR Chest 2 Views     Status: None    Narrative    XR CHEST 2 VW  2/2/2021 9:17 PM      HISTORY: chest pain, Hx of CAD    COMPARISON: 1/25/2020 chest x-ray    FINDINGS: Upright, AP and lateral views of the chest.     The cardiac silhouette size is within normal limits. No significant  pleural effusion or pneumothorax. No airspace consolidation. The  visualized upper abdomen and osseus structures appear normal.      Impression    IMPRESSION: No acute cardiopulmonary findings.     I have personally reviewed the examination and initial interpretation  and I agree with the findings.    LAINE BERKOWITZ MD   [facility] Radiologist Consult For Cardiology     Status: None    Narrative    EXAMINATION: [facility] RADIOLOGIST CONSULT FOR CARDIOLOGY, 2/3/2021 11:40  AM     COMPARISON: Chest x-ray 2/2/2021    HISTORY: Chest pain.    TECHNIQUE: Limited field of view 5 mm CT images were acquired for  attenuation correction purposes for nuclear medicine cardiac study.  Radiology consulted to review the CT images. Please refer to separate  dictation for the nuclear medicine portion of the study.    FINDINGS:     Heart size is within normal limits. Normal caliber of the visualized  thoracic aorta and main pulmonary artery. Hyperdensities within the  coronary arteries in  keeping with history of stents. Possible  component moderate coronary artery calcifications. No pericardial  effusion. The visualized mediastinal and hilar lymph nodes are not  enlarged.    No pleural effusion or pneumothorax at this level. Mild atelectasis.    Visualized upper abdomen is unremarkable. No acute osseous lesions of  the visualized thorax.      Impression    IMPRESSION: No acute abnormality on CT images.     I have personally reviewed the examination and initial interpretation  and I agree with the findings.    ANTONETTE DEAN MD   CBC with platelets differential     Status: None   Result Value Ref Range    WBC 6.9 4.0 - 11.0 10e9/L    RBC Count 5.19 4.4 - 5.9 10e12/L    Hemoglobin 16.1 13.3 - 17.7 g/dL    Hematocrit 46.8 40.0 - 53.0 %    MCV 90 78 - 100 fl    MCH 31.0 26.5 - 33.0 pg    MCHC 34.4 31.5 - 36.5 g/dL    RDW 11.9 10.0 - 15.0 %    Platelet Count 320 150 - 450 10e9/L    Diff Method Automated Method     % Neutrophils 58.6 %    % Lymphocytes 28.9 %    % Monocytes 9.3 %    % Eosinophils 1.9 %    % Basophils 1.0 %    % Immature Granulocytes 0.3 %    Nucleated RBCs 0 0 /100    Absolute Neutrophil 4.0 1.6 - 8.3 10e9/L    Absolute Lymphocytes 2.0 0.8 - 5.3 10e9/L    Absolute Monocytes 0.6 0.0 - 1.3 10e9/L    Absolute Eosinophils 0.1 0.0 - 0.7 10e9/L    Absolute Basophils 0.1 0.0 - 0.2 10e9/L    Abs Immature Granulocytes 0.0 0 - 0.4 10e9/L    Absolute Nucleated RBC 0.0    Comprehensive metabolic panel     Status: Abnormal   Result Value Ref Range    Sodium 137 133 - 144 mmol/L    Potassium 3.6 3.4 - 5.3 mmol/L    Chloride 106 94 - 109 mmol/L    Carbon Dioxide 26 20 - 32 mmol/L    Anion Gap 5 3 - 14 mmol/L    Glucose 102 (H) 70 - 99 mg/dL    Urea Nitrogen 11 7 - 30 mg/dL    Creatinine 0.89 0.66 - 1.25 mg/dL    GFR Estimate >90 >60 mL/min/[1.73_m2]    GFR Estimate If Black >90 >60 mL/min/[1.73_m2]    Calcium 9.1 8.5 - 10.1 mg/dL    Bilirubin Total 0.3 0.2 - 1.3 mg/dL    Albumin 4.2 3.4 - 5.0 g/dL     Protein Total 7.5 6.8 - 8.8 g/dL    Alkaline Phosphatase 101 40 - 150 U/L    ALT 28 0 - 70 U/L    AST 19 0 - 45 U/L   Lipase     Status: None   Result Value Ref Range    Lipase 147 73 - 393 U/L   Troponin I     Status: None   Result Value Ref Range    Troponin I ES <0.015 0.000 - 0.045 ug/L   Asymptomatic SARS-CoV-2 COVID-19 Virus (Coronavirus) by PCR     Status: None    Specimen: Nasopharyngeal   Result Value Ref Range    SARS-CoV-2 Virus Specimen Source Nasopharyngeal     SARS-CoV-2 PCR Result NEGATIVE     SARS-CoV-2 PCR Comment       Testing was performed using the Xpert Xpress SARS-CoV-2 Assay on the Cepheid Gene-Xpert   Instrument Systems. Additional information about this Emergency Use Authorization (EUA)   assay can be found via the Lab Guide.     Troponin I     Status: None   Result Value Ref Range    Troponin I ES <0.015 0.000 - 0.045 ug/L   Troponin I     Status: None   Result Value Ref Range    Troponin I ES <0.015 0.000 - 0.045 ug/L   EKG 12-lead, tracing only     Status: None   Result Value Ref Range    Interpretation ECG Click View Image link to view waveform and result    EKG 12-lead, complete     Status: None (Preliminary result)   Result Value Ref Range    Interpretation ECG Click View Image link to view waveform and result    NM Lexiscan stress test (nuc card)     Status: None   Result Value Ref Range    Target      Baseline Systolic      Baseline Diastolic BP 77     Last Stress Systolic      Last Stress Diastolic BP 76     Baseline HR 68     Max HR 90     Calculated Percent HR 51 %    Rate Pressure Product 11,160.0     Narrative       The nuclear stress test is negative for inducible myocardial ischemia   or infarction.     LVEDv 126ml. LVESv 29ml. LV EF 77%.        Recent Results (from the past 48 hour(s))   XR Chest 2 Views    Narrative    XR CHEST 2 VW  2/2/2021 9:17 PM      HISTORY: chest pain, Hx of CAD    COMPARISON: 1/25/2020 chest x-ray    FINDINGS: Upright, AP and  lateral views of the chest.     The cardiac silhouette size is within normal limits. No significant  pleural effusion or pneumothorax. No airspace consolidation. The  visualized upper abdomen and osseus structures appear normal.      Impression    IMPRESSION: No acute cardiopulmonary findings.     I have personally reviewed the examination and initial interpretation  and I agree with the findings.    LAINE BERKOWITZ MD   NM Lexiscan stress test (nuc card)   Result Value    Target     Baseline Systolic     Baseline Diastolic BP 77    Last Stress Systolic     Last Stress Diastolic BP 76    Baseline HR 68    Max HR 90    Calculated Percent HR 51    Rate Pressure Product 11,160.0   [facility] Radiologist Consult For Cardiology    Narrative    EXAMINATION: [facility] RADIOLOGIST CONSULT FOR CARDIOLOGY, 2/3/2021 11:40  AM     COMPARISON: Chest x-ray 2/2/2021    HISTORY: Chest pain.    TECHNIQUE: Limited field of view 5 mm CT images were acquired for  attenuation correction purposes for nuclear medicine cardiac study.  Radiology consulted to review the CT images. Please refer to separate  dictation for the nuclear medicine portion of the study.    FINDINGS:     Heart size is within normal limits. Normal caliber of the visualized  thoracic aorta and main pulmonary artery. Hyperdensities within the  coronary arteries in keeping with history of stents. Possible  component moderate coronary artery calcifications. No pericardial  effusion. The visualized mediastinal and hilar lymph nodes are not  enlarged.    No pleural effusion or pneumothorax at this level. Mild atelectasis.    Visualized upper abdomen is unremarkable. No acute osseous lesions of  the visualized thorax.      Impression    IMPRESSION: No acute abnormality on CT images.     I have personally reviewed the examination and initial interpretation  and I agree with the findings.    ANTONETTE DEAN MD                Pending Results:   Unresulted Labs  Ordered in the Past 30 Days of this Admission     No orders found for last 31 day(s).                  Discharge Instructions and Follow-Up:   No discharge procedures on file.       Attestation:  Charity Teresa PA-C.

## 2021-02-03 NOTE — ED TRIAGE NOTES
Pt. Presents to ED from home for nausea since Wednesday of last week and now chest pain. Pt. Reports a Hx of CAD. Hypertensive, OVSS on RA. A & O x 4, independent. Reports hes been out of his rosuvastatin for awhile now and is otherwise taking all his meds. Pt. Took 1 baby ASA at home prior to coming. Reports some palpitations while watching a movie.

## 2021-02-03 NOTE — PROGRESS NOTES
Pt here for Lexiscan nuclear stress test.  Medication and side effects reviewed with patient. Lung sounds clear to auscultation bilaterally.  Denied caffeine use.  Patient tolerated Lexiscan dose without any adverse reactions.  VSS.  Monitored post injection and then taken to the [facility] waiting room and instructed to wait there for nuclear medicine tech for follow up imaging.

## 2021-02-03 NOTE — PLAN OF CARE
"Outpatient/Observation goals to be met before discharge home:     - Serial troponins and stress test complete. - Met, trops neg x3, NM stress test completed this morning - awaiting results.  - Seen and cleared by consultant if applicable - Pending  - Adequate pain control on oral analgesia -Met, denies pain  - Vital signs normal or at patient baseline -Met  BP 98/64 (BP Location: Right arm)   Pulse 59   Temp 98.4  F (36.9  C) (Oral)   Resp 18   Ht 1.905 m (6' 3\")   Wt 113.4 kg (250 lb)   SpO2 98%   BMI 31.25 kg/m    - Safe disposition plan has been identified - Met, prior living once cleared for discharge     -pt A&O, denies chest pain and SOB, voiding spontaneously, up independently, VSS on RA   "

## 2021-02-03 NOTE — ED PROVIDER NOTES
ED Provider Note  [facility]      History     Chief Complaint   Patient presents with     Chest Pain     Nausea     The history is provided by the patient and medical records.     Travon Livingston is a 45 year old male with a medical history significant for CAD s/p PCI (2013 and 2018) and hyperlipidemia who presents to the Emergency Department for evaluation of chest pain and nausea.  Patient reports that his nausea started on 1/27/2021 (6 days ago).  Patient reports that he did not have any other symptoms at that time so he thought he had just eaten something that upset his stomach.  Patient reports that his nausea has been ongoing over the past week.  Patient reports that he went home after lunch today as he was not feeling well.  This evening, he was watching a horror movie and his heart began pounding.  Patient states that shortly afterwards he began having burning chest pain that has persisted.  Patient continues to have chest pain here in the Emergency Department and he still has ongoing nausea.  Patient denies any episodes of vomiting.  He does note that he had some mild diaphoresis earlier this afternoon, but this has since resolved.  Patient denies any associated shortness of breath and he denies any recent fevers.  Patient reports that he did take an aspirin 81 mg prior to arrival, but he did not take any nitroglycerin.  Patient reports that he is on a daily aspirin 81 mg, but he denies being on any anticoagulation medications.  Patient is also on metoprolol and he has been taking this as prescribed, but he does note that he recently ran out of his Crestor.    Per review of patient's chart, patient was last hospitalized here on 1/25/2020 at which time he presented to the Emergency Department with nausea, diaphoresis and burning epigastric pain.  At that time, troponin x2 was negative and EKG did not show any acute ischemic changes.  Patient underwent a coronary angiogram that did  "not show any acute lesions and showed that the stents were patent.    Past Medical History  Past Medical History:   Diagnosis Date     High cholesterol      Myocardial infarction (H)      Past Surgical History:   Procedure Laterality Date     coronary stints[  2013     CV CORONARY ANGIOGRAM  2020    Procedure: CV CORONARY ANGIOGRAM;  Surgeon: Giovanni Sears MD;  Location:  HEART CARDIAC CATH LAB     CV LEFT HEART CATH N/A 2020    Procedure: Left Heart Cath;  Surgeon: Giovanni Sears MD;  Location:  HEART CARDIAC CATH LAB     No current outpatient medications on file.    No Known Allergies  Family History  Family History   Problem Relation Age of Onset     C.A.D. Father      Myocardial Infarction Father         45     Myocardial Infarction Paternal Uncle         45     Myocardial Infarction Paternal Uncle      Social History   Social History     Tobacco Use     Smoking status: Former Smoker     Packs/day: 1.00     Years: 15.00     Pack years: 15.00     Types: Cigarettes     Quit date: 2011     Years since quittin.4     Smokeless tobacco: Never Used   Substance Use Topics     Alcohol use: Yes     Comment: every couple days 1-2 beers/whiskey     Drug use: No      Past medical history, past surgical history, medications, allergies, family history, and social history were reviewed with the patient. No additional pertinent items.       Review of Systems   Constitutional: Positive for diaphoresis (mild, resolved). Negative for fever.   Respiratory: Negative for shortness of breath.    Cardiovascular: Positive for chest pain (burning).   Gastrointestinal: Positive for nausea. Negative for vomiting.   All other systems reviewed and are negative.      Physical Exam   BP: (!) 147/94  Pulse: 90  Temp: 98.5  F (36.9  C)  Resp: 16  Height: 190.5 cm (6' 3\")  Weight: 113.4 kg (250 lb)  SpO2: 99 %  Physical Exam  Vitals signs and nursing note reviewed.   Constitutional:      "  General: He is not in acute distress.     Appearance: He is well-developed. He is not ill-appearing, toxic-appearing or diaphoretic.   HENT:      Head: Normocephalic and atraumatic.      Mouth/Throat:      Lips: Pink.      Mouth: Mucous membranes are moist.      Pharynx: Oropharynx is clear. No oropharyngeal exudate.   Eyes:      General: Lids are normal. No scleral icterus.     Extraocular Movements: Extraocular movements intact.      Right eye: No nystagmus.      Left eye: No nystagmus.      Conjunctiva/sclera: Conjunctivae normal.      Pupils: Pupils are equal, round, and reactive to light.   Neck:      Musculoskeletal: Normal range of motion and neck supple. No erythema or neck rigidity.      Thyroid: No thyromegaly.      Vascular: No JVD.      Trachea: No tracheal deviation.   Cardiovascular:      Rate and Rhythm: Normal rate and regular rhythm.      Pulses: Normal pulses.      Heart sounds: Normal heart sounds. No murmur. No friction rub. No gallop.    Pulmonary:      Effort: Pulmonary effort is normal. No respiratory distress.      Breath sounds: Normal breath sounds.   Abdominal:      General: Bowel sounds are normal. There is no distension.      Palpations: Abdomen is soft. There is no mass.      Tenderness: There is no abdominal tenderness. There is no guarding or rebound.   Musculoskeletal: Normal range of motion.         General: No tenderness.      Right lower leg: No edema.      Left lower leg: No edema.   Lymphadenopathy:      Cervical: No cervical adenopathy.   Skin:     General: Skin is warm and dry.      Capillary Refill: Capillary refill takes less than 2 seconds.      Coloration: Skin is not pale.      Findings: No erythema or rash.   Neurological:      Mental Status: He is alert and oriented to person, place, and time.      Cranial Nerves: No cranial nerve deficit.      Sensory: No sensory deficit.      Motor: Motor function is intact.   Psychiatric:         Mood and Affect: Mood and affect  normal.         Speech: Speech normal.         Behavior: Behavior normal.         ED Course      Procedures     8:07 PM  The patient was seen and examined by Ezequiel Floyd MD in Room ED05.                EKG Interpretation:      Interpreted by Ezequiel Floyd MD    Symptoms at time of EKG: chest pain   Rhythm: normal sinus   Rate: 83  Ectopy: none  Conduction: normal  ST Segments/ T Waves: No ST-T wave changes  Q Waves: none  Comparison to prior: Unchanged    Clinical Impression: normal EKG       Results for orders placed or performed during the hospital encounter of 02/02/21   XR Chest 2 Views     Status: None    Narrative    XR CHEST 2 VW  2/2/2021 9:17 PM      HISTORY: chest pain, Hx of CAD    COMPARISON: 1/25/2020 chest x-ray    FINDINGS: Upright, AP and lateral views of the chest.     The cardiac silhouette size is within normal limits. No significant  pleural effusion or pneumothorax. No airspace consolidation. The  visualized upper abdomen and osseus structures appear normal.      Impression    IMPRESSION: No acute cardiopulmonary findings.     I have personally reviewed the examination and initial interpretation  and I agree with the findings.    LAINE BERKOWITZ MD   CBC with platelets differential     Status: None   Result Value Ref Range    WBC 6.9 4.0 - 11.0 10e9/L    RBC Count 5.19 4.4 - 5.9 10e12/L    Hemoglobin 16.1 13.3 - 17.7 g/dL    Hematocrit 46.8 40.0 - 53.0 %    MCV 90 78 - 100 fl    MCH 31.0 26.5 - 33.0 pg    MCHC 34.4 31.5 - 36.5 g/dL    RDW 11.9 10.0 - 15.0 %    Platelet Count 320 150 - 450 10e9/L    Diff Method Automated Method     % Neutrophils 58.6 %    % Lymphocytes 28.9 %    % Monocytes 9.3 %    % Eosinophils 1.9 %    % Basophils 1.0 %    % Immature Granulocytes 0.3 %    Nucleated RBCs 0 0 /100    Absolute Neutrophil 4.0 1.6 - 8.3 10e9/L    Absolute Lymphocytes 2.0 0.8 - 5.3 10e9/L    Absolute Monocytes 0.6 0.0 - 1.3 10e9/L    Absolute Eosinophils 0.1 0.0 - 0.7 10e9/L     Absolute Basophils 0.1 0.0 - 0.2 10e9/L    Abs Immature Granulocytes 0.0 0 - 0.4 10e9/L    Absolute Nucleated RBC 0.0    Comprehensive metabolic panel     Status: Abnormal   Result Value Ref Range    Sodium 137 133 - 144 mmol/L    Potassium 3.6 3.4 - 5.3 mmol/L    Chloride 106 94 - 109 mmol/L    Carbon Dioxide 26 20 - 32 mmol/L    Anion Gap 5 3 - 14 mmol/L    Glucose 102 (H) 70 - 99 mg/dL    Urea Nitrogen 11 7 - 30 mg/dL    Creatinine 0.89 0.66 - 1.25 mg/dL    GFR Estimate >90 >60 mL/min/[1.73_m2]    GFR Estimate If Black >90 >60 mL/min/[1.73_m2]    Calcium 9.1 8.5 - 10.1 mg/dL    Bilirubin Total 0.3 0.2 - 1.3 mg/dL    Albumin 4.2 3.4 - 5.0 g/dL    Protein Total 7.5 6.8 - 8.8 g/dL    Alkaline Phosphatase 101 40 - 150 U/L    ALT 28 0 - 70 U/L    AST 19 0 - 45 U/L   Lipase     Status: None   Result Value Ref Range    Lipase 147 73 - 393 U/L   Troponin I     Status: None   Result Value Ref Range    Troponin I ES <0.015 0.000 - 0.045 ug/L   Asymptomatic SARS-CoV-2 COVID-19 Virus (Coronavirus) by PCR     Status: None    Specimen: Nasopharyngeal   Result Value Ref Range    SARS-CoV-2 Virus Specimen Source Nasopharyngeal     SARS-CoV-2 PCR Result NEGATIVE     SARS-CoV-2 PCR Comment       Testing was performed using the Xpert Xpress SARS-CoV-2 Assay on the Cepheid Gene-Xpert   Instrument Systems. Additional information about this Emergency Use Authorization (EUA)   assay can be found via the Lab Guide.     Troponin I     Status: None   Result Value Ref Range    Troponin I ES <0.015 0.000 - 0.045 ug/L   EKG 12-lead, tracing only     Status: None (Preliminary result)   Result Value Ref Range    Interpretation ECG Click View Image link to view waveform and result      Medications   lisinopril (ZESTRIL) tablet 10 mg (has no administration in time range)   rosuvastatin (CRESTOR) tablet 40 mg (has no administration in time range)   lidocaine 1 % 0.1-1 mL (has no administration in time range)   lidocaine (LMX4) cream (has no  administration in time range)   sodium chloride (PF) 0.9% PF flush 3 mL (has no administration in time range)   sodium chloride (PF) 0.9% PF flush 3 mL (has no administration in time range)   aspirin (ASA) chewable tablet 162 mg (162 mg Oral Not Given 2/2/21 2349)   nitroGLYcerin (NITROSTAT) sublingual tablet 0.4 mg (has no administration in time range)   alum & mag hydroxide-simethicone (MAALOX) suspension 30 mL (has no administration in time range)   acetaminophen (TYLENOL) tablet 650 mg (has no administration in time range)   acetaminophen (TYLENOL) Suppository 650 mg (has no administration in time range)   aspirin EC tablet 81 mg (has no administration in time range)   metoprolol tartrate (LOPRESSOR) half-tab 12.5 mg (12.5 mg Oral Given 2/2/21 2101)        Assessments & Plan (with Medical Decision Making)   This patient presented to the Emergency Department complaining of chest pain.  He is high risk given his past history of CAD and multiple stents.  Twelve-lead EKG demonstrated no acute ischemic changes and initial troponin is negative.  Chest x-ray demonstrated no acute cardiopulmonary finding.  I feel that patient is at low risk for PE and is PERC negative, so I am comfortable not imaging for PE as this is low on my differential at this point.  At this point, I do feel period of observation for serial troponins is warranted.  I have discussed the case with the ADRIAN in the observation unit.  Patient has already had his aspirin and I did provide him with his evening dose of metoprolol as his blood pressure was somewhat on the high side when he presented.  There is nothing in his history to suggest acute aortic pathology.  No evidence on abdominal exam to suggest acute hepatobiliary or pancreatic disease.  At this point, I am comfortable admitting to the observation unit for further treatment and chest pain rule out protocol.    This part of the medical record was transcribed by Christian Guerrero, Medical Scribe,  from a dictation done by Ezequiel Floyd MD.       I have reviewed the nursing notes. I have reviewed the findings, diagnosis, plan and need for follow up with the patient.    Current Discharge Medication List          Final diagnoses:   Chest pain, unspecified type       --  I, Christian Guerrero, am serving as a trained medical scribe to document services personally performed by Ezequiel Floyd MD, based on the provider's statements to me.     I, Ezequiel Floyd MD, was physically present and have reviewed and verified the accuracy of this note documented by Christian Guerrero.    Ezequiel Floyd MD  [facility] EMERGENCY DEPARTMENT  2/2/2021     Ezequiel Floyd MD  02/04/21 1124

## 2021-02-03 NOTE — PLAN OF CARE
Outpatient/Observation goals to be met before discharge home:     - Serial troponins and stress test complete. -trops neg x3, plan for NM stress test in the morning   - Seen and cleared by consultant if applicable -not met   - Adequate pain control on oral analgesia -met, denies pain  - Vital signs normal or at patient baseline -met  - Safe disposition plan has been identified -prior living once cleared for discharge     -pt A&O, denies chest pain and SOB, voiding spontaneously, up independently, VSS on RA

## 2021-02-03 NOTE — H&P
[facility]     History and Physical - ED Observation       Date of Admission:  2/2/2021    Assessment & Plan   Travon Livingston is a 45 year old male admitted on 2/2/2021. He has a medical history significant for CAD s/p PCI (2013 and 2018) and hyperlipidemia who presents to the Emergency Department for evaluation of chest pain and nausea.     ##Chest pain  45 yr old male with history of CAD s/p PCI with 4 stents in RCA and LAD, who presents for evaluation of chest pain. He has had nausea for nearly a week with chest pain and diaphoresis that started today. No shortness of breath, no fevers. Last echo 7/2018 was normal with EF 55-60%. Pt underwent coronary angiography when he presented with similar symptoms 1/2020 and was found to have minimal in-stent restenosis. HEART Score: 3. In the ED, VSS, although he was initially a little hypertensive. EKG shows NSR. Troponin negative. Chest Xray normal. Medications: Lopressor 12.5mg PO x1. Discussed patient with cardiology fellow Mohsan Chaudry who recommends Lexiscan given recent administration of beta blocker. Plan: Admit to ED Observation for ACS Rule Out.   -[place] to ED Observation  -VS Q4hrs  -Telemetry  -Serial troponins  -Lexiscan  -Continue PTA lisinopril, rosuvastatin, ASA 81mg    ##HTN: Continue lisinopril. HOLD lopressor.     ##HLD: Continue rosuvastatin.     Diet: Combination Diet Regular Diet Adult; No Caffeine Diet    DVT Prophylaxis: Low Risk/Ambulatory with no VTE prophylaxis indicated  Colon Catheter: not present  Code Status: Full Code         Disposition Plan   Expected discharge: Tomorrow, recommended to prior living arrangement once cardiac workup complete.  Entered: Vivian Fernandez CNP 02/02/2021, 11:21 PM     The patient's care was discussed with the Bedside Nurse, Patient, Patient's Family and Attending MD, Dr Floyd.    JACQUELIN [facility]   ED  "Observation, Ascom:[id number]  ______________________________________________________________________    Chief Complaint   Chest pain     History is obtained from the patient  And review of the medical record    History of Present Illness   Per ED, \"Travon Livingston is a 45 year old male with a medical history significant for CAD s/p PCI (2013 and 2018) and hyperlipidemia who presents to the Emergency Department for evaluation of chest pain and nausea.  Patient reports that his nausea started on 1/27/2021 (6 days ago).  Patient reports that he did not have any other symptoms at that time so he thought he had just eaten something that upset his stomach.  Patient reports that his nausea has been ongoing over the past week.  Patient reports that he went home after lunch today as he was not feeling well.  This evening, he was watching a horror movie and his heart began pounding.  Patient states that shortly afterwards he began having burning chest pain that has persisted.  Patient continues to have chest pain here in the Emergency Department and he still has ongoing nausea.  Patient denies any episodes of vomiting.  He does note that he had some mild diaphoresis earlier this afternoon, but this has since resolved.  Patient denies any associated shortness of breath and he denies any recent fevers.  Patient reports that he did take an aspirin 81 mg prior to arrival, but he did not take any nitroglycerin.  Patient reports that he is on a daily aspirin 81 mg, but he denies being on any anticoagulation medications.  Patient is also on metoprolol and he has been taking this as prescribed, but he does note that he recently ran out of his Crestor.    Per review of patient's chart, patient was last hospitalized here on 1/25/2020 at which time he presented to the Emergency Department with nausea, diaphoresis and burning epigastric pain.  At that time, troponin x2 was negative and EKG did not show any acute ischemic changes.  Patient " "underwent a coronary angiogram that did not show any acute lesions and showed that the stents were patent.\"    In the ED, /94, HR 90, temp 98.5, RR 16, SPO2 99% on RA. Labs show Na 137, K+ 3.6, BUN 11, Cr 0.89. Troponin negative. Lipase 147. WBC 6.9, Hgb 16.1, hematocrit 46.8. EKG shows NSR. Chest Xray with no acute cardiopulmonary findings. Covid negative. Medications: Patient was given lopressor 12.5mg PO x1 in the ED. Plan: Admit to ED Observation for acute coronary syndrome rule out.     On arrival in ED Observation, the pt was stable.    Review of Systems    Constitutional: Positive for diaphoresis (mild, resolved). Negative for fever.   Respiratory: Negative for shortness of breath.    Cardiovascular: Positive for chest pain (burning).   Gastrointestinal: Positive for nausea. Negative for vomiting.   All other systems reviewed and are negative.    Past Medical History    I have reviewed this patient's medical history and updated it with pertinent information if needed.   Past Medical History:   Diagnosis Date     High cholesterol      Myocardial infarction (H)        Past Surgical History   I have reviewed this patient's surgical history and updated it with pertinent information if needed.  Past Surgical History:   Procedure Laterality Date     coronary stints[  2013     CV CORONARY ANGIOGRAM  2020    Procedure: CV CORONARY ANGIOGRAM;  Surgeon: Giovanni Sears MD;  Location: [facility] CARDIAC CATH LAB     CV LEFT HEART CATH N/A 2020    Procedure: Left Heart Cath;  Surgeon: Giovanni Sears MD;  Location: [facility] CARDIAC CATH LAB       Social History   I have reviewed this patient's social history and updated it with pertinent information if needed.  Social History     Tobacco Use     Smoking status: Former Smoker     Packs/day: 1.00     Years: 15.00     Pack years: 15.00     Types: Cigarettes     Quit date: 2011     Years since quittin.4     Smokeless " tobacco: Never Used   Substance Use Topics     Alcohol use: Yes     Comment: every couple days 1-2 beers/whiskey     Drug use: No       Family History   I have reviewed this patient's family history and updated it with pertinent information if needed.  Family History   Problem Relation Age of Onset     C.A.D. Father      Myocardial Infarction Father         45     Myocardial Infarction Paternal Uncle         45     Myocardial Infarction Paternal Uncle        Prior to Admission Medications   Prior to Admission Medications   Prescriptions Last Dose Informant Patient Reported? Taking?   aspirin EC 81 MG EC tablet   No No   Sig: Take 1 tablet by mouth daily.   lisinopril (ZESTRIL) 10 MG tablet   No No   Sig: TAKE 1 TABLET BY MOUTH EVERY DAY   metoprolol tartrate (LOPRESSOR) 25 MG tablet   No No   Sig: Take 0.5 tablets (12.5 mg) by mouth 2 times daily   rosuvastatin (CRESTOR) 40 MG tablet   No No   Sig: Take 1 tablet (40 mg) by mouth daily      Facility-Administered Medications: None     Allergies   No Known Allergies    Physical Exam   Vital Signs: Temp: 98.5  F (36.9  C) Temp src: Oral BP: 118/86 Pulse: 56   Resp: 16 SpO2: 100 % O2 Device: None (Room air)    Weight: 250 lbs 0 oz    Constitutional: awake, alert, cooperative, no apparent distress, and appears stated age  Eyes: Lids and lashes normal, pupils equal, round and reactive to light, extra ocular muscles intact, sclera clear, conjunctiva normal  ENT: Normocephalic, atraumatic, external ears without lesions, oral pharynx with moist mucous membranes, gums normal and good dentition.  Respiratory: No increased work of breathing, good air exchange, clear to auscultation bilaterally, no crackles or wheezing  Cardiovascular: Normal apical impulse, regular rate and rhythm, normal S1 and S2, no S3 or S4, and no murmur noted  Abdomen: Normal bowel sounds, soft, non-distended, non-tender, no masses palpated, no hepatosplenomegally  Skin: Normal skin color, texture,  turgor  Musculoskeletal: There is no redness, warmth, or swelling of the joints.  Full range of motion noted.  Motor strength is 5 out of 5 all extremities bilaterally.  Tone is normal.  Neurologic: Awake, alert, oriented to name, place and time.  Cranial nerves II-XII are grossly intact.  Motor is 5 out of 5 bilaterally.  Gait is normal.    Data     Recent Labs   Lab 02/02/21 2001   WBC 6.9   HGB 16.1   MCV 90         POTASSIUM 3.6   CHLORIDE 106   CO2 26   BUN 11   CR 0.89   ANIONGAP 5   TIERA 9.1   *   ALBUMIN 4.2   PROTTOTAL 7.5   BILITOTAL 0.3   ALKPHOS 101   ALT 28   AST 19   LIPASE 147   TROPI <0.015     Recent Results (from the past 24 hour(s))   XR Chest 2 Views    Narrative    XR CHEST 2 VW  2/2/2021 9:17 PM      HISTORY: chest pain, Hx of CAD    COMPARISON: 1/25/2020 chest x-ray    FINDINGS: Upright, AP and lateral views of the chest.     The cardiac silhouette size is within normal limits. No significant  pleural effusion or pneumothorax. No airspace consolidation. The  visualized upper abdomen and osseus structures appear normal.      Impression    IMPRESSION: No acute cardiopulmonary findings.     I have personally reviewed the examination and initial interpretation  and I agree with the findings.    LAINE BERKOWITZ MD

## 2021-02-03 NOTE — PLAN OF CARE
Outpatient/Observation goals to be met before discharge home:     - Serial troponins and stress test complete. -not met   - Seen and cleared by consultant if applicable -not met   - Adequate pain control on oral analgesia -met, denies pain  - Vital signs normal or at patient baseline -met  - Safe disposition plan has been identified -prior living once cleared for discharge

## 2021-02-03 NOTE — PLAN OF CARE
"Outpatient/Observation goals to be met before discharge home:     - Serial troponins and stress test complete. - Met, trops neg x3, NM stress test completed this morning - awaiting results.  - Seen and cleared by consultant if applicable - Pending  - Adequate pain control on oral analgesia -Met, denies pain  - Vital signs normal or at patient baseline -Met  BP 98/64 (BP Location: Right arm)   Pulse 59   Temp 98.4  F (36.9  C) (Oral)   Resp 18   Ht 1.905 m (6' 3\")   Wt 113.4 kg (250 lb)   SpO2 98%   BMI 31.25 kg/m    - Safe disposition plan has been identified - Met, prior living once cleared for discharge; plan to discharge yet this afternoon.    -pt A&O, denies chest pain and SOB, voiding spontaneously, up independently, VSS on RA, tolerated Regular Diet, Wife at bedside.   "

## 2021-03-09 ENCOUNTER — OFFICE VISIT (OUTPATIENT)
Dept: FAMILY MEDICINE | Facility: CLINIC | Age: 46
End: 2021-03-09
Payer: COMMERCIAL

## 2021-03-09 VITALS
BODY MASS INDEX: 31.37 KG/M2 | SYSTOLIC BLOOD PRESSURE: 140 MMHG | DIASTOLIC BLOOD PRESSURE: 96 MMHG | WEIGHT: 251 LBS | TEMPERATURE: 98.5 F

## 2021-03-09 DIAGNOSIS — Z95.820 S/P ANGIOPLASTY WITH STENT: ICD-10-CM

## 2021-03-09 DIAGNOSIS — G47.00 INSOMNIA, UNSPECIFIED TYPE: ICD-10-CM

## 2021-03-09 DIAGNOSIS — F43.22 ADJUSTMENT DISORDER WITH ANXIOUS MOOD: ICD-10-CM

## 2021-03-09 DIAGNOSIS — I25.10 CORONARY ARTERY DISEASE WITHOUT ANGINA PECTORIS, UNSPECIFIED VESSEL OR LESION TYPE, UNSPECIFIED WHETHER NATIVE OR TRANSPLANTED HEART: ICD-10-CM

## 2021-03-09 DIAGNOSIS — F41.0 PANIC ATTACK: Primary | ICD-10-CM

## 2021-03-09 PROCEDURE — 99214 OFFICE O/P EST MOD 30 MIN: CPT | Performed by: PHYSICIAN ASSISTANT

## 2021-03-09 RX ORDER — CLONAZEPAM 1 MG/1
TABLET ORAL
Qty: 15 TABLET | Refills: 1 | Status: SHIPPED | OUTPATIENT
Start: 2021-03-09 | End: 2021-08-23

## 2021-03-09 RX ORDER — ROSUVASTATIN CALCIUM 40 MG/1
40 TABLET, COATED ORAL DAILY
Qty: 90 TABLET | Refills: 1 | Status: SHIPPED | OUTPATIENT
Start: 2021-03-09 | End: 2021-08-23

## 2021-03-09 RX ORDER — HYDROXYZINE HYDROCHLORIDE 25 MG/1
12.5-25 TABLET, FILM COATED ORAL 3 TIMES DAILY PRN
Qty: 30 TABLET | Refills: 1 | Status: SHIPPED | OUTPATIENT
Start: 2021-03-09 | End: 2021-08-23

## 2021-03-09 NOTE — PATIENT INSTRUCTIONS
1. Our therapy schedulers will call you - you can also check around for other options   2. Also check with the walk in counseling center      [facility] - [phone]  No Appointments Needed. Volunteer Therapists. Free!    [place] Site:  [address]    M,W: 100-300 PM and 630-830 PM  T, Th: 630-830 PM  F: 100-300 PM    [place] Site:  [address] [place], [place] [address]val  [place]    M, W: 500-700 PM    [facility] - 24 hours a day, 7 days a week  [phone]

## 2021-03-21 ENCOUNTER — MYC MEDICAL ADVICE (OUTPATIENT)
Dept: FAMILY MEDICINE | Facility: CLINIC | Age: 46
End: 2021-03-21

## 2021-03-26 DIAGNOSIS — I25.10 CORONARY ARTERY DISEASE WITHOUT ANGINA PECTORIS, UNSPECIFIED VESSEL OR LESION TYPE, UNSPECIFIED WHETHER NATIVE OR TRANSPLANTED HEART: ICD-10-CM

## 2021-03-26 DIAGNOSIS — Z95.820 S/P ANGIOPLASTY WITH STENT: ICD-10-CM

## 2021-03-26 RX ORDER — LISINOPRIL 10 MG/1
10 TABLET ORAL DAILY
Qty: 90 TABLET | Refills: 1 | Status: SHIPPED | OUTPATIENT
Start: 2021-03-26 | End: 2021-08-23

## 2021-03-26 NOTE — TELEPHONE ENCOUNTER
Routing refill request to provider for review/approval because:  BP under 140/90 in past 12 months    Kira Junior RN

## 2021-08-17 ASSESSMENT — ENCOUNTER SYMPTOMS
DYSURIA: 0
WEAKNESS: 0
HEMATURIA: 0
DIZZINESS: 0
COUGH: 0
HEADACHES: 0
PARESTHESIAS: 0
CHILLS: 0
CONSTIPATION: 0
FEVER: 0
FREQUENCY: 0
JOINT SWELLING: 0
HEARTBURN: 0
NERVOUS/ANXIOUS: 0
ARTHRALGIAS: 0
SHORTNESS OF BREATH: 0
PALPITATIONS: 0
NAUSEA: 0
ABDOMINAL PAIN: 0
HEMATOCHEZIA: 0
SORE THROAT: 0
DIARRHEA: 0
MYALGIAS: 0
EYE PAIN: 0

## 2021-08-23 ENCOUNTER — OFFICE VISIT (OUTPATIENT)
Dept: FAMILY MEDICINE | Facility: CLINIC | Age: 46
End: 2021-08-23
Payer: COMMERCIAL

## 2021-08-23 VITALS
SYSTOLIC BLOOD PRESSURE: 122 MMHG | TEMPERATURE: 96.8 F | WEIGHT: 242.4 LBS | HEIGHT: 75 IN | OXYGEN SATURATION: 100 % | HEART RATE: 67 BPM | BODY MASS INDEX: 30.14 KG/M2 | DIASTOLIC BLOOD PRESSURE: 74 MMHG

## 2021-08-23 DIAGNOSIS — Z82.49 FAMILY HISTORY OF ASCVD: ICD-10-CM

## 2021-08-23 DIAGNOSIS — Z95.820 S/P ANGIOPLASTY WITH STENT: ICD-10-CM

## 2021-08-23 DIAGNOSIS — E78.5 HYPERLIPIDEMIA LDL GOAL <70: ICD-10-CM

## 2021-08-23 DIAGNOSIS — I25.10 CORONARY ARTERY DISEASE WITHOUT ANGINA PECTORIS, UNSPECIFIED VESSEL OR LESION TYPE, UNSPECIFIED WHETHER NATIVE OR TRANSPLANTED HEART: ICD-10-CM

## 2021-08-23 DIAGNOSIS — Z00.00 ROUTINE CHECK-UP: Primary | ICD-10-CM

## 2021-08-23 LAB
ANION GAP SERPL CALCULATED.3IONS-SCNC: 3 MMOL/L (ref 3–14)
BUN SERPL-MCNC: 12 MG/DL (ref 7–30)
CALCIUM SERPL-MCNC: 9.3 MG/DL (ref 8.5–10.1)
CHLORIDE BLD-SCNC: 107 MMOL/L (ref 94–109)
CHOLEST SERPL-MCNC: 171 MG/DL
CO2 SERPL-SCNC: 25 MMOL/L (ref 20–32)
CREAT SERPL-MCNC: 0.97 MG/DL (ref 0.66–1.25)
FASTING STATUS PATIENT QL REPORTED: YES
GFR SERPL CREATININE-BSD FRML MDRD: >90 ML/MIN/1.73M2
GLUCOSE BLD-MCNC: 90 MG/DL (ref 70–99)
HDLC SERPL-MCNC: 60 MG/DL
LDLC SERPL CALC-MCNC: 97 MG/DL
NONHDLC SERPL-MCNC: 111 MG/DL
POTASSIUM BLD-SCNC: 4.4 MMOL/L (ref 3.4–5.3)
SODIUM SERPL-SCNC: 135 MMOL/L (ref 133–144)
TRIGL SERPL-MCNC: 69 MG/DL

## 2021-08-23 PROCEDURE — 80048 BASIC METABOLIC PNL TOTAL CA: CPT | Performed by: PHYSICIAN ASSISTANT

## 2021-08-23 PROCEDURE — 80061 LIPID PANEL: CPT | Performed by: PHYSICIAN ASSISTANT

## 2021-08-23 PROCEDURE — 99396 PREV VISIT EST AGE 40-64: CPT | Performed by: PHYSICIAN ASSISTANT

## 2021-08-23 PROCEDURE — 36415 COLL VENOUS BLD VENIPUNCTURE: CPT | Performed by: PHYSICIAN ASSISTANT

## 2021-08-23 RX ORDER — ROSUVASTATIN CALCIUM 40 MG/1
40 TABLET, COATED ORAL DAILY
Qty: 90 TABLET | Refills: 3 | Status: SHIPPED | OUTPATIENT
Start: 2021-08-23 | End: 2022-05-26

## 2021-08-23 RX ORDER — METOPROLOL TARTRATE 25 MG/1
12.5 TABLET, FILM COATED ORAL 2 TIMES DAILY
Qty: 90 TABLET | Refills: 3 | Status: SHIPPED | OUTPATIENT
Start: 2021-08-23 | End: 2022-05-26

## 2021-08-23 RX ORDER — LISINOPRIL 10 MG/1
10 TABLET ORAL DAILY
Qty: 90 TABLET | Refills: 3 | Status: SHIPPED | OUTPATIENT
Start: 2021-08-23 | End: 2022-05-26

## 2021-08-23 ASSESSMENT — ENCOUNTER SYMPTOMS
HEADACHES: 0
FEVER: 0
PARESTHESIAS: 0
JOINT SWELLING: 0
MYALGIAS: 0
PALPITATIONS: 0
ARTHRALGIAS: 0
CHILLS: 0
HEARTBURN: 0
NAUSEA: 0
ABDOMINAL PAIN: 0
FREQUENCY: 0
WEAKNESS: 0
DIZZINESS: 0
CONSTIPATION: 0
DIARRHEA: 0
HEMATOCHEZIA: 0
EYE PAIN: 0
NERVOUS/ANXIOUS: 0
HEMATURIA: 0
SORE THROAT: 0
DYSURIA: 0
COUGH: 0
SHORTNESS OF BREATH: 0

## 2021-08-23 ASSESSMENT — PAIN SCALES - GENERAL: PAINLEVEL: NO PAIN (0)

## 2021-08-23 ASSESSMENT — MIFFLIN-ST. JEOR: SCORE: 2078.27

## 2021-08-23 NOTE — PATIENT INSTRUCTIONS
Patient Education    [facility]  18 THROUGH 21 YEAR VISITS  Here are some suggestions from [employer] experts that may be of value to your family.     HOW YOU ARE DOING  Enjoy spending time with your family.  Find activities you are really interested in, such as sports, theater, or volunteering.  Try to be responsible for your schoolwork or work obligations.  Always talk through problems and never use violence.  If you get angry with someone, try to walk away.  If you feel unsafe in your home or have been hurt by someone, let us know. Hotlines and community agencies can also provide confidential help.  Talk with us if you are worried about your living or food situation. Community agencies and programs such as SNAP can help.  Don t smoke, vape, or use drugs. Avoid people who do when you can. Talk with us if you are worried about alcohol or drug use in your family.    YOUR DAILY LIFE  Visit the dentist at least twice a year.  Brush your teeth at least twice a day and floss once a day.  Be a healthy eater.  Have vegetables, fruits, lean protein, and whole grains at meals and snacks.  Limit fatty, sugary, salty foods that are low in nutrients, such as candy, chips, and ice cream.  Eat when you re hungry. Stop when you feel satisfied.  Eat breakfast.  Drink plenty of water.  Make sure to get enough calcium every day.  Have 3 or more servings of low-fat (1%) or fat-free milk and other low-fat dairy products, such as yogurt and cheese.  Women: Make sure to eat foods rich in folate, such as fortified grains and dark- green leafy vegetables.  Aim for at least 1 hour of physical activity every day.  Wear safety equipment when you play sports.  Get enough sleep.  Talk with us about managing your health care and insurance as an adult.    YOUR FEELINGS  Most people have ups and downs. If you are feeling sad, depressed, nervous, irritable, hopeless, or angry, let us know or reach out to another health  care professional.  Figure out healthy ways to deal with stress.  Try your best to solve problems and make decisions on your own.  Sexuality is an important part of your life. If you have any questions or concerns, we are here for you.    HEALTHY BEHAVIOR CHOICES  Avoid using drugs, alcohol, tobacco, steroids, and diet pills. Support friends who choose not to use.  If you use drugs or alcohol, let us know or talk with another trusted adult about it. We can help you with quitting or cutting down on your use.  Make healthy decisions about your sexual behavior.  If you are sexually active, always practice safe sex. Always use birth control along with a condom to prevent pregnancy and sexually transmitted infections.  All sexual activity should be something you want. No one should ever force or try to convince you.  Protect your hearing at work, home, and concerts. Keep your earbud volume down.    STAYING SAFE  Always be a safe and cautious .  Insist that everyone use a lap and shoulder seat belt.  Limit the number of friends in the car and avoid driving at night.  Avoid distractions. Never text or talk on the phone while you drive.  Do not ride in a vehicle with someone who has been using drugs or alcohol.  If you feel unsafe driving or riding with someone, call someone you trust to drive you.  Wear helmets and protective gear while playing sports. Wear a helmet when riding a bike, a motorcycle, or an ATV or when skiing or skateboarding.  Always use sunscreen and a hat when you re outside.  Fighting and carrying weapons can be dangerous. Talk with your parents, teachers, or doctor about how to avoid these situations.        Consistent with Bright Futures: Guidelines for Health Supervision of Infants, Children, and Adolescents, 4th Edition  For more information, go to https://brightfutures.aap.org.

## 2021-08-23 NOTE — PROGRESS NOTES
SUBJECTIVE:   CC: Travon Livingston is an 46 year old male who presents for preventative health visit.   Patient has been advised of split billing requirements and indicates understanding: Yes  Healthy Habits:     Getting at least 3 servings of Calcium per day:  Yes    Bi-annual eye exam:  Yes    Dental care twice a year:  NO    Sleep apnea or symptoms of sleep apnea:  None    Diet:  Low fat/cholesterol    Frequency of exercise:  4-5 days/week    Duration of exercise:  45-60 minutes    Taking medications regularly:  Yes    Medication side effects:  None    PHQ-2 Total Score: 0    Additional concerns today:  No    Today's PHQ-2 Score:   PHQ-2 ( 1999 Pfizer) 8/17/2021   Q1: Little interest or pleasure in doing things 0   Q2: Feeling down, depressed or hopeless 0   PHQ-2 Score 0   Q1: Little interest or pleasure in doing things Not at all   Q2: Feeling down, depressed or hopeless Not at all   PHQ-2 Score 0     Abuse: Current or Past(Physical, Sexual or Emotional)- No  Do you feel safe in your environment? Yes    Have you ever done Advance Care Planning? (For example, a Health Directive, POLST, or a discussion with a medical provider or your loved ones about your wishes): Yes, advance care planning is on file.    Social History     Tobacco Use     Smoking status: Former Smoker     Packs/day: 1.00     Years: 15.00     Pack years: 15.00     Types: Cigarettes     Quit date: 8/23/2011     Years since quitting: 10.0     Smokeless tobacco: Never Used   Substance Use Topics     Alcohol use: Yes     Comment: every couple days 1-2 beers/whiskey     If you drink alcohol do you typically have >3 drinks per day or >7 drinks per week? No    Alcohol Use 8/17/2021   Prescreen: >3 drinks/day or >7 drinks/week? No   Prescreen: >3 drinks/day or >7 drinks/week? -       Last PSA: No results found for: PSA    Reviewed orders with patient. Reviewed health maintenance and updated orders accordingly - Yes  Lab work is in process    Reviewed and  "updated as needed this visit by clinical staff  Tobacco  Allergies  Meds              Reviewed and updated as needed this visit by Provider                    Review of Systems   Constitutional: Negative for chills and fever.   HENT: Negative for congestion, ear pain, hearing loss and sore throat.    Eyes: Negative for pain and visual disturbance.   Respiratory: Negative for cough and shortness of breath.    Cardiovascular: Negative for chest pain, palpitations and peripheral edema.   Gastrointestinal: Negative for abdominal pain, constipation, diarrhea, heartburn, hematochezia and nausea.   Genitourinary: Positive for impotence. Negative for discharge, dysuria, frequency, genital sores, hematuria and urgency.   Musculoskeletal: Negative for arthralgias, joint swelling and myalgias.   Skin: Negative for rash.   Neurological: Negative for dizziness, weakness, headaches and paresthesias.   Psychiatric/Behavioral: Negative for mood changes. The patient is not nervous/anxious.        OBJECTIVE:   /74 (BP Location: Right arm, Patient Position: Chair, Cuff Size: Adult Large)   Pulse 67   Temp 96.8  F (36  C) (Tympanic)   Ht 1.91 m (6' 3.2\")   Wt 110 kg (242 lb 6.4 oz)   SpO2 100%   BMI 30.14 kg/m      Physical Exam  GENERAL: healthy, alert and no distress  EYES: Eyes grossly normal to inspection, PERRL and conjunctivae and sclerae normal  HENT: ear canals and TM's normal, nose and mouth without ulcers or lesions  NECK: no adenopathy, no asymmetry, masses, or scars and thyroid normal to palpation  RESP: lungs clear to auscultation - no rales, rhonchi or wheezes  CV: regular rate and rhythm, normal S1 S2, no S3 or S4, no murmur, click or rub, no peripheral edema and peripheral pulses strong  ABDOMEN: soft, nontender, no hepatosplenomegaly, no masses and bowel sounds normal  MS: no gross musculoskeletal defects noted, no edema  SKIN: no suspicious lesions or rashes  NEURO: Normal strength and tone, mentation " "intact and speech normal  PSYCH: mentation appears normal, affect normal/bright  LYMPH: no cervical, supraclavicular, axillary, or inguinal adenopathy    Diagnostic Test Results:  Labs reviewed in Epic    ASSESSMENT/PLAN:   (Z00.129) Encounter for routine child health examination w/o abnormal findings  (primary encounter diagnosis)  Comment: Well person   Plan: SCREENING TEST, PURE TONE, AIR ONLY, SCREENING,        VISUAL ACUITY, QUANTITATIVE, BILAT, Basic         metabolic panel  (Ca, Cl, CO2, Creat, Gluc, K,         Na, BUN), Lipid panel reflex to direct LDL         Fasting        Diet, exercise, wellness and other preventive recommendations related to health maintenance were discussed.  Follow up as needed for acute issues.  Physical exam in 1 year.     (E78.5) Hyperlipidemia LDL goal <70  Comment:   Plan:     (I25.10) Coronary artery disease without angina pectoris, unspecified vessel or lesion type, unspecified whether native or transplanted heart  Comment:   Plan: lisinopril (ZESTRIL) 10 MG tablet, metoprolol         tartrate (LOPRESSOR) 25 MG tablet, rosuvastatin        (CRESTOR) 40 MG tablet        Asymptomatic - no concerns. Exercises 4-5 times a week without issues     (Z95.820) S/P angioplasty with stent  Comment:   Plan: lisinopril (ZESTRIL) 10 MG tablet, metoprolol         tartrate (LOPRESSOR) 25 MG tablet, rosuvastatin        (CRESTOR) 40 MG tablet        As noted     (Z82.49) Family history of ASCVD  Comment:   Plan: As noted     Patient has been advised of split billing requirements and indicates understanding: Yes  COUNSELING:   Reviewed preventive health counseling, as reflected in patient instructions    Estimated body mass index is 30.14 kg/m  as calculated from the following:    Height as of this encounter: 1.91 m (6' 3.2\").    Weight as of this encounter: 110 kg (242 lb 6.4 oz).         He reports that he quit smoking about 10 years ago. His smoking use included cigarettes. He has a 15.00 " pack-year smoking history. He has never used smokeless tobacco.      Counseling Resources:  ATP IV Guidelines  Pooled Cohorts Equation Calculator  FRAX Risk Assessment  ICSI Preventive Guidelines  Dietary Guidelines for Americans, 2010  USDA's MyPlate  ASA Prophylaxis  Lung CA Screening    MARIA GUADALUPE CAROLINA [facility]

## 2021-09-19 ENCOUNTER — HEALTH MAINTENANCE LETTER (OUTPATIENT)
Age: 46
End: 2021-09-19

## 2022-04-27 ENCOUNTER — OFFICE VISIT (OUTPATIENT)
Dept: URGENT CARE | Facility: URGENT CARE | Age: 47
End: 2022-04-27

## 2022-04-27 ENCOUNTER — E-VISIT (OUTPATIENT)
Dept: URGENT CARE | Facility: CLINIC | Age: 47
End: 2022-04-27
Payer: COMMERCIAL

## 2022-04-27 VITALS
SYSTOLIC BLOOD PRESSURE: 137 MMHG | BODY MASS INDEX: 32.28 KG/M2 | TEMPERATURE: 97.2 F | DIASTOLIC BLOOD PRESSURE: 85 MMHG | OXYGEN SATURATION: 98 % | WEIGHT: 259.6 LBS | HEART RATE: 90 BPM

## 2022-04-27 DIAGNOSIS — R30.0 DYSURIA: Primary | ICD-10-CM

## 2022-04-27 DIAGNOSIS — R36.0 DISCHARGE FROM PENIS WITHOUT BLOOD: ICD-10-CM

## 2022-04-27 DIAGNOSIS — R30.0 DIFFICULT OR PAINFUL URINATION: Primary | ICD-10-CM

## 2022-04-27 LAB
ALBUMIN UR-MCNC: NEGATIVE MG/DL
APPEARANCE UR: CLEAR
BACTERIA #/AREA URNS HPF: ABNORMAL /HPF
BILIRUB UR QL STRIP: NEGATIVE
COLOR UR AUTO: YELLOW
GLUCOSE UR STRIP-MCNC: NEGATIVE MG/DL
HGB UR QL STRIP: ABNORMAL
KETONES UR STRIP-MCNC: NEGATIVE MG/DL
LEUKOCYTE ESTERASE UR QL STRIP: ABNORMAL
NITRATE UR QL: NEGATIVE
PH UR STRIP: 5.5 [PH] (ref 5–7)
RBC #/AREA URNS AUTO: ABNORMAL /HPF
SP GR UR STRIP: <=1.005 (ref 1–1.03)
SQUAMOUS #/AREA URNS AUTO: ABNORMAL /LPF
UROBILINOGEN UR STRIP-ACNC: 0.2 E.U./DL
WBC #/AREA URNS AUTO: ABNORMAL /HPF

## 2022-04-27 PROCEDURE — 87661 TRICHOMONAS VAGINALIS AMPLIF: CPT | Performed by: PHYSICIAN ASSISTANT

## 2022-04-27 PROCEDURE — 81001 URINALYSIS AUTO W/SCOPE: CPT | Performed by: PHYSICIAN ASSISTANT

## 2022-04-27 PROCEDURE — 99213 OFFICE O/P EST LOW 20 MIN: CPT | Performed by: PHYSICIAN ASSISTANT

## 2022-04-27 PROCEDURE — 87491 CHLMYD TRACH DNA AMP PROBE: CPT | Performed by: PHYSICIAN ASSISTANT

## 2022-04-27 PROCEDURE — 99207 PR NON-BILLABLE SERV PER CHARTING: CPT | Performed by: PHYSICIAN ASSISTANT

## 2022-04-27 PROCEDURE — 87591 N.GONORRHOEAE DNA AMP PROB: CPT | Performed by: PHYSICIAN ASSISTANT

## 2022-04-27 ASSESSMENT — ENCOUNTER SYMPTOMS
CARDIOVASCULAR NEGATIVE: 1
SORE THROAT: 0
MYALGIAS: 1
ABDOMINAL PAIN: 0
HEMATURIA: 0
FREQUENCY: 1
ALLERGIC/IMMUNOLOGIC NEGATIVE: 1
HEADACHES: 0
RESPIRATORY NEGATIVE: 1
NEUROLOGICAL NEGATIVE: 1
CHILLS: 1
SHORTNESS OF BREATH: 0
GASTROINTESTINAL NEGATIVE: 1
FEVER: 0
DIARRHEA: 0
PALPITATIONS: 0
CHEST TIGHTNESS: 0
WHEEZING: 0
VOMITING: 0
DYSURIA: 1
NAUSEA: 0
COUGH: 0

## 2022-04-27 NOTE — PATIENT INSTRUCTIONS
Dear Travon Livingston,    We are sorry you are not feeling well. Based on the responses you provided, it is recommended that you be seen in-person in urgent care so we can better evaluate your symptoms. Please click here to find the nearest urgent care location to you.   You will not be charged for this Visit. Thank you for trusting us with your care.    Jono Pratt PA-C

## 2022-04-28 LAB — T VAGINALIS DNA SPEC QL NAA+PROBE: NOT DETECTED

## 2022-04-29 LAB
C TRACH DNA SPEC QL NAA+PROBE: NEGATIVE
N GONORRHOEA DNA SPEC QL NAA+PROBE: NEGATIVE

## 2022-05-26 ENCOUNTER — OFFICE VISIT (OUTPATIENT)
Dept: FAMILY MEDICINE | Facility: CLINIC | Age: 47
End: 2022-05-26
Payer: COMMERCIAL

## 2022-05-26 VITALS
TEMPERATURE: 98 F | HEIGHT: 75 IN | SYSTOLIC BLOOD PRESSURE: 120 MMHG | OXYGEN SATURATION: 99 % | WEIGHT: 252 LBS | HEART RATE: 76 BPM | DIASTOLIC BLOOD PRESSURE: 78 MMHG | BODY MASS INDEX: 31.33 KG/M2

## 2022-05-26 DIAGNOSIS — I25.10 CORONARY ARTERY DISEASE WITHOUT ANGINA PECTORIS, UNSPECIFIED VESSEL OR LESION TYPE, UNSPECIFIED WHETHER NATIVE OR TRANSPLANTED HEART: ICD-10-CM

## 2022-05-26 DIAGNOSIS — E78.5 HYPERLIPIDEMIA LDL GOAL <70: ICD-10-CM

## 2022-05-26 DIAGNOSIS — R53.83 FATIGUE, UNSPECIFIED TYPE: ICD-10-CM

## 2022-05-26 DIAGNOSIS — Z12.5 SCREENING FOR PROSTATE CANCER: ICD-10-CM

## 2022-05-26 DIAGNOSIS — Z95.820 S/P ANGIOPLASTY WITH STENT: ICD-10-CM

## 2022-05-26 DIAGNOSIS — Z00.00 ROUTINE GENERAL MEDICAL EXAMINATION AT A HEALTH CARE FACILITY: Primary | ICD-10-CM

## 2022-05-26 DIAGNOSIS — Z23 ENCOUNTER FOR IMMUNIZATION: ICD-10-CM

## 2022-05-26 LAB
ALBUMIN SERPL-MCNC: 3.8 G/DL (ref 3.4–5)
ALP SERPL-CCNC: 80 U/L (ref 40–150)
ALT SERPL W P-5'-P-CCNC: 27 U/L (ref 0–70)
ANION GAP SERPL CALCULATED.3IONS-SCNC: 7 MMOL/L (ref 3–14)
AST SERPL W P-5'-P-CCNC: 16 U/L (ref 0–45)
BASOPHILS # BLD AUTO: 0.1 10E3/UL (ref 0–0.2)
BASOPHILS NFR BLD AUTO: 1 %
BILIRUB SERPL-MCNC: 0.8 MG/DL (ref 0.2–1.3)
BUN SERPL-MCNC: 13 MG/DL (ref 7–30)
CALCIUM SERPL-MCNC: 8.9 MG/DL (ref 8.5–10.1)
CHLORIDE BLD-SCNC: 106 MMOL/L (ref 94–109)
CHOLEST SERPL-MCNC: 224 MG/DL
CO2 SERPL-SCNC: 25 MMOL/L (ref 20–32)
CREAT SERPL-MCNC: 0.95 MG/DL (ref 0.66–1.25)
EOSINOPHIL # BLD AUTO: 0.1 10E3/UL (ref 0–0.7)
EOSINOPHIL NFR BLD AUTO: 3 %
ERYTHROCYTE [DISTWIDTH] IN BLOOD BY AUTOMATED COUNT: 12.5 % (ref 10–15)
FASTING STATUS PATIENT QL REPORTED: YES
GFR SERPL CREATININE-BSD FRML MDRD: >90 ML/MIN/1.73M2
GLUCOSE BLD-MCNC: 96 MG/DL (ref 70–99)
HCT VFR BLD AUTO: 46.4 % (ref 40–53)
HDLC SERPL-MCNC: 59 MG/DL
HGB BLD-MCNC: 15.6 G/DL (ref 13.3–17.7)
LDLC SERPL CALC-MCNC: 138 MG/DL
LYMPHOCYTES # BLD AUTO: 1.6 10E3/UL (ref 0.8–5.3)
LYMPHOCYTES NFR BLD AUTO: 31 %
MCH RBC QN AUTO: 31.3 PG (ref 26.5–33)
MCHC RBC AUTO-ENTMCNC: 33.6 G/DL (ref 31.5–36.5)
MCV RBC AUTO: 93 FL (ref 78–100)
MONOCYTES # BLD AUTO: 0.6 10E3/UL (ref 0–1.3)
MONOCYTES NFR BLD AUTO: 13 %
NEUTROPHILS # BLD AUTO: 2.7 10E3/UL (ref 1.6–8.3)
NEUTROPHILS NFR BLD AUTO: 53 %
NONHDLC SERPL-MCNC: 165 MG/DL
PLATELET # BLD AUTO: 285 10E3/UL (ref 150–450)
POTASSIUM BLD-SCNC: 4.1 MMOL/L (ref 3.4–5.3)
PROT SERPL-MCNC: 7.3 G/DL (ref 6.8–8.8)
PSA SERPL-MCNC: 1.81 UG/L (ref 0–4)
RBC # BLD AUTO: 4.99 10E6/UL (ref 4.4–5.9)
SODIUM SERPL-SCNC: 138 MMOL/L (ref 133–144)
TRIGL SERPL-MCNC: 133 MG/DL
TSH SERPL DL<=0.005 MIU/L-ACNC: 0.95 MU/L (ref 0.4–4)
WBC # BLD AUTO: 5 10E3/UL (ref 4–11)

## 2022-05-26 PROCEDURE — 90471 IMMUNIZATION ADMIN: CPT | Performed by: FAMILY MEDICINE

## 2022-05-26 PROCEDURE — 36415 COLL VENOUS BLD VENIPUNCTURE: CPT | Performed by: FAMILY MEDICINE

## 2022-05-26 PROCEDURE — 99396 PREV VISIT EST AGE 40-64: CPT | Mod: 25 | Performed by: FAMILY MEDICINE

## 2022-05-26 PROCEDURE — 90715 TDAP VACCINE 7 YRS/> IM: CPT | Performed by: FAMILY MEDICINE

## 2022-05-26 PROCEDURE — 80050 GENERAL HEALTH PANEL: CPT | Performed by: FAMILY MEDICINE

## 2022-05-26 PROCEDURE — G0103 PSA SCREENING: HCPCS | Performed by: FAMILY MEDICINE

## 2022-05-26 PROCEDURE — 80061 LIPID PANEL: CPT | Performed by: FAMILY MEDICINE

## 2022-05-26 PROCEDURE — 99214 OFFICE O/P EST MOD 30 MIN: CPT | Mod: 25 | Performed by: FAMILY MEDICINE

## 2022-05-26 RX ORDER — METOPROLOL SUCCINATE 25 MG/1
25 TABLET, EXTENDED RELEASE ORAL DAILY
Qty: 90 TABLET | Refills: 3 | Status: ON HOLD | OUTPATIENT
Start: 2022-05-26 | End: 2023-06-05

## 2022-05-26 RX ORDER — ROSUVASTATIN CALCIUM 40 MG/1
40 TABLET, COATED ORAL DAILY
Qty: 90 TABLET | Refills: 3 | Status: ON HOLD | OUTPATIENT
Start: 2022-05-26 | End: 2023-06-05

## 2022-05-26 ASSESSMENT — PAIN SCALES - GENERAL: PAINLEVEL: NO PAIN (0)

## 2022-05-26 NOTE — PATIENT INSTRUCTIONS
Restart daily aspirin    Start once daily metoprolol    Restart rosuvastatin    Keep working on healthy diet/exercise and wt loss    We will send you lab results                  Preventive Health Recommendations  Male Ages 40 to 49    Yearly exam:             See your health care provider every year in order to  o   Review health changes.   o   Discuss preventive care.    o   Review your medicines if your doctor has prescribed any.  You should be tested each year for STDs (sexually transmitted diseases) if you re at risk.   Have a cholesterol test every 5 years.   Have a colonoscopy (test for colon cancer) if someone in your family has had colon cancer or polyps before age 50.   After age 45, have a diabetes test (fasting glucose). If you are at risk for diabetes, you should have this test every 3 years.    Talk with your health care provider about whether or not a prostate cancer screening test (PSA) is right for you.    Shots: Get a flu shot each year. Get a tetanus shot every 10 years.     Nutrition:  Eat at least 5 servings of fruits and vegetables daily.   Eat whole-grain bread, whole-wheat pasta and brown rice instead of white grains and rice.   Get adequate Calcium and Vitamin D.     Lifestyle  Exercise for at least 150 minutes a week (30 minutes a day, 5 days a week). This will help you control your weight and prevent disease.   Limit alcohol to one drink per day.   No smoking.   Wear sunscreen to prevent skin cancer.   See your dentist every six months for an exam and cleaning.

## 2022-05-26 NOTE — PROGRESS NOTES
SUBJECTIVE:   CC: Travon Livingston is an 46 year old male who presents for preventative health visit.        Patient has been advised of split billing requirements and indicates understanding: Yes  Healthy Habits:     Getting at least 3 servings of Calcium per day:  Yes    Bi-annual eye exam:  Yes    Dental care twice a year:  Yes    Sleep apnea or symptoms of sleep apnea:  None    Diet:  Low fat/cholesterol    Frequency of exercise:  4-5 days/week    Duration of exercise:  Greater than 60 minutes    Taking medications regularly:  7    Barriers to taking medications:  Other    Medication side effects:  Other    PHQ-2 Total Score: 1    Additional concerns today:  Yes  History of Present Illness       Reason for visit:  Checkup    He eats 2-3 servings of fruits and vegetables daily.He consumes 0 sweetened beverage(s) daily.He exercises with enough effort to increase his heart rate 30 to 60 minutes per day.  He exercises with enough effort to increase his heart rate 5 days per week. He is missing 7 dose(s) of medications per week.  He is not taking prescribed medications regularly due to Other.               Today's PHQ-2 Score:   PHQ-2 ( 1999 Pfizer) 5/26/2022   Q1: Little interest or pleasure in doing things 1   Q2: Feeling down, depressed or hopeless 0   PHQ-2 Score 1   PHQ-2 Total Score (12-17 Years)- Positive if 3 or more points; Administer PHQ-A if positive -   Q1: Little interest or pleasure in doing things Several days   Q2: Feeling down, depressed or hopeless Not at all   PHQ-2 Score 1          Social History     Tobacco Use     Smoking status: Former Smoker     Packs/day: 1.00     Years: 15.00     Pack years: 15.00     Types: Cigarettes     Quit date: 8/23/2011     Years since quitting: 10.7     Smokeless tobacco: Never Used   Substance Use Topics     Alcohol use: Yes     Comment: every couple days 1-2 beers/whiskey         Alcohol Use 8/23/2021   Prescreen: >3 drinks/day or >7 drinks/week? -   Prescreen: >3  "drinks/day or >7 drinks/week? No       Last PSA: No results found for: PSA    Reviewed orders with patient. Reviewed health maintenance and updated orders accordingly - Yes       Reviewed and updated as needed this visit by clinical staff   Tobacco  Allergies  Meds   Med Hx  Surg Hx  Fam Hx  Soc Hx          Reviewed and updated as needed this visit by Provider                       Review of Systems  Feeling fine  No concerns    Blood pressure fine at home     4 stents already, some at 37 and some 4 years later    Stressed in past    Got     Better now    Kick boxing/ wt lifting    Active workout    Mostly seated    Kids healthy    7-8 hours of sleep     Mild covid illness      OBJECTIVE:   /78 (BP Location: Left arm, Patient Position: Chair, Cuff Size: Adult Large)   Pulse 76   Temp 98  F (36.7  C) (Temporal)   Ht 1.91 m (6' 3.2\")   Wt 114.3 kg (252 lb)   SpO2 99%   BMI 31.33 kg/m      Physical Exam  GENERAL: healthy, alert and no distress  EYES: Eyes grossly normal to inspection, PERRL and conjunctivae and sclerae normal  HENT: ear canals and TM's normal, nose and mouth without ulcers or lesions  NECK: no adenopathy, no asymmetry, masses, or scars and thyroid normal to palpation  RESP: lungs clear to auscultation - no rales, rhonchi or wheezes  CV: regular rate and rhythm, normal S1 S2, no S3 or S4, no murmur, click or rub, no peripheral edema and peripheral pulses strong  ABDOMEN: soft, nontender, no hepatosplenomegaly, no masses and bowel sounds normal  MS: no gross musculoskeletal defects noted, no edema  SKIN: no suspicious lesions or rashes  NEURO: Normal strength and tone, mentation intact and speech normal  PSYCH: mentation appears normal, affect normal/bright    Diagnostic Test Results:  Labs reviewed in Epic    ASSESSMENT/PLAN:   Travon was seen today for physical.    Diagnoses and all orders for this visit:    [facility]    Encounter for immunization  -     TDAP VACCINE (Adacel, Boostrix)  -     SCREENING QUESTIONS FOR ADULT IMMUNIZATIONS    Coronary artery disease without angina pectoris, unspecified vessel or lesion type, unspecified whether native or transplanted heart  -     metoprolol succinate ER (TOPROL XL) 25 MG 24 hr tablet; Take 1 tablet (25 mg) by mouth daily  -     rosuvastatin (CRESTOR) 40 MG tablet; Take 1 tablet (40 mg) by mouth daily    S/P angioplasty with stent  -     metoprolol succinate ER (TOPROL XL) 25 MG 24 hr tablet; Take 1 tablet (25 mg) by mouth daily  -     rosuvastatin (CRESTOR) 40 MG tablet; Take 1 tablet (40 mg) by mouth daily    Fatigue, unspecified type  -     CBC with Platelets & Differential; Future  -     TSH with free T4 reflex; Future  -     CBC with Platelets & Differential  -     TSH with free T4 reflex    Hyperlipidemia LDL goal <70  -     Lipid panel reflex to direct LDL Fasting; Future  -     Comprehensive metabolic panel; Future  -     Lipid panel reflex to direct LDL Fasting  -     Comprehensive metabolic panel    Screening for prostate cancer  -     Prostate Specific Antigen Screen; Future  -     Prostate Specific Antigen Screen    Discussed several issues with patient  Need to restart beta blocker but can do once daily form metoprolol  EF is fine so okay to hold off on acei  Restart high dose statin  resart asa   Keep working on healthy diet/exercise and wt loss  No std concern  Check labs fasting   tdap given    Patient has been advised of split billing requirements and indicates understanding: Yes    COUNSELING:   Reviewed preventive health counseling, as reflected in patient instructions       Regular exercise       Healthy diet/nutrition       Vision screening       Family planning       Safe sex practices/STD prevention       Prostate cancer screening    Estimated body mass index is 31.33 kg/m  as calculated from the following:    Height as of this encounter: 1.91 m (6'  "3.2\").    Weight as of this encounter: 114.3 kg (252 lb).     Weight management plan: Discussed healthy diet and exercise guidelines    He reports that he quit smoking about 10 years ago. His smoking use included cigarettes. He has a 15.00 pack-year smoking history. He has never used smokeless tobacco.      Counseling Resources:  ATP IV Guidelines  Pooled Cohorts Equation Calculator  FRAX Risk Assessment  ICSI Preventive Guidelines  Dietary Guidelines for Americans, 2010  USDA's MyPlate  ASA Prophylaxis  Lung CA Screening    Cesario Peña MD  [facility]  "

## 2022-05-27 NOTE — RESULT ENCOUNTER NOTE
Cholesterol is high; restart statin medication as discussed    Other labs are fine    Cesario Peña MD

## 2022-11-20 ENCOUNTER — HEALTH MAINTENANCE LETTER (OUTPATIENT)
Age: 47
End: 2022-11-20

## 2023-05-05 ENCOUNTER — TELEPHONE (OUTPATIENT)
Dept: FAMILY MEDICINE | Facility: CLINIC | Age: 48
End: 2023-05-05
Payer: COMMERCIAL

## 2023-05-05 NOTE — TELEPHONE ENCOUNTER
Patient Quality Outreach    Patient is due for the following:   Colon Cancer Screening  Physical Preventive Adult Physical,  - colonoscopy    Next Steps:   Schedule a Adult Preventative    Type of outreach:    Sent [email or web address] message.    Next Steps:  Reach out within 90 days via Phone.    Max number of attempts reached: No. Will try again in 90 days if patient still on fail list.    Questions for provider review:    None           Natalie Rhodes, [facility]  Chart routed to Care Team.

## 2023-05-16 NOTE — TELEPHONE ENCOUNTER
Patient Quality Outreach    Patient is due for the following:   Colon Cancer Screening  Physical Preventive Adult Physical    Next Steps:   Schedule a Adult Preventative    Type of outreach:    patient has reviewed my chart message    Next Steps:  Reach out within 90 days via Phone.    Max number of attempts reached: Yes. Will try again in 90 days if patient still on fail list.    Questions for provider review:    None           Natalie Rhodes, [facility]  Chart routed to chart closed.

## 2023-06-04 ENCOUNTER — HOSPITAL ENCOUNTER (OUTPATIENT)
Facility: CLINIC | Age: 48
Setting detail: OBSERVATION
Discharge: HOME | End: 2023-06-05
Attending: EMERGENCY MEDICINE | Admitting: NURSE PRACTITIONER
Payer: COMMERCIAL

## 2023-06-04 ENCOUNTER — APPOINTMENT (OUTPATIENT)
Dept: GENERAL RADIOLOGY | Facility: CLINIC | Age: 48
End: 2023-06-04
Attending: EMERGENCY MEDICINE
Payer: COMMERCIAL

## 2023-06-04 DIAGNOSIS — R10.13 EPIGASTRIC PAIN: ICD-10-CM

## 2023-06-04 DIAGNOSIS — I25.10 CAD S/P PERCUTANEOUS CORONARY ANGIOPLASTY: ICD-10-CM

## 2023-06-04 DIAGNOSIS — I10 BENIGN ESSENTIAL HYPERTENSION: ICD-10-CM

## 2023-06-04 DIAGNOSIS — I25.10 CAD (CORONARY ARTERY DISEASE): ICD-10-CM

## 2023-06-04 DIAGNOSIS — Z82.49 FAMILY HISTORY OF ASCVD: ICD-10-CM

## 2023-06-04 DIAGNOSIS — I25.10 CORONARY ARTERY DISEASE WITHOUT ANGINA PECTORIS, UNSPECIFIED VESSEL OR LESION TYPE, UNSPECIFIED WHETHER NATIVE OR TRANSPLANTED HEART: ICD-10-CM

## 2023-06-04 DIAGNOSIS — R07.9 CHEST PAIN, UNSPECIFIED TYPE: ICD-10-CM

## 2023-06-04 DIAGNOSIS — R07.89 ATYPICAL CHEST PAIN: ICD-10-CM

## 2023-06-04 DIAGNOSIS — Z95.820 S/P ANGIOPLASTY WITH STENT: Primary | ICD-10-CM

## 2023-06-04 DIAGNOSIS — K21.9 GASTROESOPHAGEAL REFLUX DISEASE WITHOUT ESOPHAGITIS: ICD-10-CM

## 2023-06-04 DIAGNOSIS — R07.89 OTHER CHEST PAIN: ICD-10-CM

## 2023-06-04 DIAGNOSIS — E78.5 HYPERLIPIDEMIA LDL GOAL <70: ICD-10-CM

## 2023-06-04 DIAGNOSIS — I20.0 UNSTABLE ANGINA PECTORIS (H): ICD-10-CM

## 2023-06-04 DIAGNOSIS — Z98.61 CAD S/P PERCUTANEOUS CORONARY ANGIOPLASTY: ICD-10-CM

## 2023-06-04 LAB
ALBUMIN SERPL BCG-MCNC: 4.6 G/DL (ref 3.5–5.2)
ALP SERPL-CCNC: 89 U/L (ref 40–129)
ALT SERPL W P-5'-P-CCNC: 23 U/L (ref 10–50)
ANION GAP SERPL CALCULATED.3IONS-SCNC: 12 MMOL/L (ref 7–15)
AST SERPL W P-5'-P-CCNC: 22 U/L (ref 10–50)
BASOPHILS # BLD AUTO: 0.1 10E3/UL (ref 0–0.2)
BASOPHILS NFR BLD AUTO: 1 %
BILIRUB SERPL-MCNC: 0.4 MG/DL
BUN SERPL-MCNC: 11.5 MG/DL (ref 6–20)
CALCIUM SERPL-MCNC: 9.6 MG/DL (ref 8.6–10)
CHLORIDE SERPL-SCNC: 104 MMOL/L (ref 98–107)
CREAT SERPL-MCNC: 1.04 MG/DL (ref 0.67–1.17)
DEPRECATED HCO3 PLAS-SCNC: 23 MMOL/L (ref 22–29)
EOSINOPHIL # BLD AUTO: 0.1 10E3/UL (ref 0–0.7)
EOSINOPHIL NFR BLD AUTO: 2 %
ERYTHROCYTE [DISTWIDTH] IN BLOOD BY AUTOMATED COUNT: 12.5 % (ref 10–15)
GFR SERPL CREATININE-BSD FRML MDRD: 90 ML/MIN/1.73M2
GLUCOSE SERPL-MCNC: 101 MG/DL (ref 70–99)
HCT VFR BLD AUTO: 49 % (ref 40–53)
HGB BLD-MCNC: 16.3 G/DL (ref 13.3–17.7)
HOLD SPECIMEN: NORMAL
IMM GRANULOCYTES # BLD: 0 10E3/UL
IMM GRANULOCYTES NFR BLD: 0 %
LIPASE SERPL-CCNC: 35 U/L (ref 13–60)
LYMPHOCYTES # BLD AUTO: 1.9 10E3/UL (ref 0.8–5.3)
LYMPHOCYTES NFR BLD AUTO: 28 %
MCH RBC QN AUTO: 30.9 PG (ref 26.5–33)
MCHC RBC AUTO-ENTMCNC: 33.3 G/DL (ref 31.5–36.5)
MCV RBC AUTO: 93 FL (ref 78–100)
MONOCYTES # BLD AUTO: 0.7 10E3/UL (ref 0–1.3)
MONOCYTES NFR BLD AUTO: 10 %
NEUTROPHILS # BLD AUTO: 4.1 10E3/UL (ref 1.6–8.3)
NEUTROPHILS NFR BLD AUTO: 59 %
NRBC # BLD AUTO: 0 10E3/UL
NRBC BLD AUTO-RTO: 0 /100
PLATELET # BLD AUTO: 304 10E3/UL (ref 150–450)
POTASSIUM SERPL-SCNC: 4.2 MMOL/L (ref 3.4–5.3)
PROT SERPL-MCNC: 7.3 G/DL (ref 6.4–8.3)
RBC # BLD AUTO: 5.27 10E6/UL (ref 4.4–5.9)
SODIUM SERPL-SCNC: 139 MMOL/L (ref 136–145)
TROPONIN T SERPL HS-MCNC: <6 NG/L
WBC # BLD AUTO: 6.9 10E3/UL (ref 4–11)

## 2023-06-04 PROCEDURE — 83690 ASSAY OF LIPASE: CPT | Performed by: EMERGENCY MEDICINE

## 2023-06-04 PROCEDURE — 250N000011 HC RX IP 250 OP 636: Performed by: NURSE PRACTITIONER

## 2023-06-04 PROCEDURE — 36415 COLL VENOUS BLD VENIPUNCTURE: CPT | Performed by: NURSE PRACTITIONER

## 2023-06-04 PROCEDURE — 84484 ASSAY OF TROPONIN QUANT: CPT | Mod: 91 | Performed by: PHYSICIAN ASSISTANT

## 2023-06-04 PROCEDURE — 84484 ASSAY OF TROPONIN QUANT: CPT | Performed by: NURSE PRACTITIONER

## 2023-06-04 PROCEDURE — 93010 ELECTROCARDIOGRAM REPORT: CPT | Performed by: EMERGENCY MEDICINE

## 2023-06-04 PROCEDURE — 96374 THER/PROPH/DIAG INJ IV PUSH: CPT

## 2023-06-04 PROCEDURE — 99285 EMERGENCY DEPT VISIT HI MDM: CPT | Mod: 25 | Performed by: EMERGENCY MEDICINE

## 2023-06-04 PROCEDURE — 250N000013 HC RX MED GY IP 250 OP 250 PS 637: Performed by: NURSE PRACTITIONER

## 2023-06-04 PROCEDURE — 36415 COLL VENOUS BLD VENIPUNCTURE: CPT | Performed by: EMERGENCY MEDICINE

## 2023-06-04 PROCEDURE — 71046 X-RAY EXAM CHEST 2 VIEWS: CPT | Mod: 26 | Performed by: RADIOLOGY

## 2023-06-04 PROCEDURE — 80053 COMPREHEN METABOLIC PANEL: CPT | Performed by: EMERGENCY MEDICINE

## 2023-06-04 PROCEDURE — G0378 HOSPITAL OBSERVATION PER HR: HCPCS

## 2023-06-04 PROCEDURE — 85025 COMPLETE CBC W/AUTO DIFF WBC: CPT | Performed by: EMERGENCY MEDICINE

## 2023-06-04 PROCEDURE — 84484 ASSAY OF TROPONIN QUANT: CPT | Performed by: EMERGENCY MEDICINE

## 2023-06-04 PROCEDURE — 99221 1ST HOSP IP/OBS SF/LOW 40: CPT | Performed by: INTERNAL MEDICINE

## 2023-06-04 PROCEDURE — C9113 INJ PANTOPRAZOLE SODIUM, VIA: HCPCS | Performed by: NURSE PRACTITIONER

## 2023-06-04 PROCEDURE — 36415 COLL VENOUS BLD VENIPUNCTURE: CPT | Performed by: PHYSICIAN ASSISTANT

## 2023-06-04 PROCEDURE — 93005 ELECTROCARDIOGRAM TRACING: CPT | Performed by: EMERGENCY MEDICINE

## 2023-06-04 PROCEDURE — 250N000013 HC RX MED GY IP 250 OP 250 PS 637: Performed by: EMERGENCY MEDICINE

## 2023-06-04 PROCEDURE — 71046 X-RAY EXAM CHEST 2 VIEWS: CPT

## 2023-06-04 RX ORDER — NITROGLYCERIN 0.4 MG/1
0.4 TABLET SUBLINGUAL EVERY 5 MIN PRN
Status: DISCONTINUED | OUTPATIENT
Start: 2023-06-04 | End: 2023-06-05 | Stop reason: HOSPADM

## 2023-06-04 RX ORDER — ASPIRIN 81 MG/1
324 TABLET, CHEWABLE ORAL ONCE
Status: COMPLETED | OUTPATIENT
Start: 2023-06-04 | End: 2023-06-04

## 2023-06-04 RX ORDER — MAGNESIUM HYDROXIDE/ALUMINUM HYDROXICE/SIMETHICONE 120; 1200; 1200 MG/30ML; MG/30ML; MG/30ML
30 SUSPENSION ORAL EVERY 4 HOURS PRN
Status: DISCONTINUED | OUTPATIENT
Start: 2023-06-04 | End: 2023-06-05 | Stop reason: HOSPADM

## 2023-06-04 RX ORDER — ASPIRIN 81 MG/1
162 TABLET, CHEWABLE ORAL ONCE
Status: COMPLETED | OUTPATIENT
Start: 2023-06-04 | End: 2023-06-04

## 2023-06-04 RX ORDER — ASPIRIN 81 MG/1
81 TABLET ORAL DAILY
Status: DISCONTINUED | OUTPATIENT
Start: 2023-06-05 | End: 2023-06-05 | Stop reason: HOSPADM

## 2023-06-04 RX ORDER — METOPROLOL SUCCINATE 25 MG/1
25 TABLET, EXTENDED RELEASE ORAL DAILY
Status: DISCONTINUED | OUTPATIENT
Start: 2023-06-04 | End: 2023-06-05 | Stop reason: HOSPADM

## 2023-06-04 RX ORDER — NITROGLYCERIN 0.4 MG/1
0.4 TABLET SUBLINGUAL EVERY 5 MIN PRN
Status: DISCONTINUED | OUTPATIENT
Start: 2023-06-04 | End: 2023-06-04

## 2023-06-04 RX ORDER — ROSUVASTATIN CALCIUM 40 MG/1
40 TABLET, COATED ORAL DAILY
Status: DISCONTINUED | OUTPATIENT
Start: 2023-06-04 | End: 2023-06-05 | Stop reason: HOSPADM

## 2023-06-04 RX ORDER — MAGNESIUM HYDROXIDE/ALUMINUM HYDROXICE/SIMETHICONE 120; 1200; 1200 MG/30ML; MG/30ML; MG/30ML
30 SUSPENSION ORAL EVERY 4 HOURS PRN
Status: DISCONTINUED | OUTPATIENT
Start: 2023-06-04 | End: 2023-06-04

## 2023-06-04 RX ADMIN — ROSUVASTATIN CALCIUM 40 MG: 40 TABLET, COATED ORAL at 15:21

## 2023-06-04 RX ADMIN — ASPIRIN 81 MG CHEWABLE TABLET 324 MG: 81 TABLET CHEWABLE at 06:01

## 2023-06-04 RX ADMIN — NITROGLYCERIN 0.4 MG: 0.4 TABLET SUBLINGUAL at 06:06

## 2023-06-04 RX ADMIN — PANTOPRAZOLE SODIUM 40 MG: 40 INJECTION, POWDER, FOR SOLUTION INTRAVENOUS at 08:35

## 2023-06-04 RX ADMIN — METOPROLOL SUCCINATE 25 MG: 25 TABLET, EXTENDED RELEASE ORAL at 15:20

## 2023-06-04 RX ADMIN — ASPIRIN 81 MG CHEWABLE TABLET 162 MG: 81 TABLET CHEWABLE at 15:21

## 2023-06-04 ASSESSMENT — ACTIVITIES OF DAILY LIVING (ADL)
ADLS_ACUITY_SCORE: 33
ADLS_ACUITY_SCORE: 33
ADLS_ACUITY_SCORE: 35
ADLS_ACUITY_SCORE: 31
ADLS_ACUITY_SCORE: 33
ADLS_ACUITY_SCORE: 33
ADLS_ACUITY_SCORE: 31
ADLS_ACUITY_SCORE: 33
ADLS_ACUITY_SCORE: 33

## 2023-06-04 NOTE — ED TRIAGE NOTES
"Triage Assessment & Note:    BP (!) 142/96   Pulse 78   Temp 98.4  F (36.9  C) (Oral)   Resp 16   Ht 1.905 m (6' 3\")   Wt 114.3 kg (252 lb)   SpO2 99%   BMI 31.50 kg/m        Patient presents with: Pt with cardiac hx comes ambulatory into triage with report chest pressure/ left arm pain. No reports of fever, cough, SOB, or travel.     Home Treatments/Remedies: home medications    Febrile / Afebrile: afebrile    Duration of C/o: off several days    Isabella Saleem RN  June 4, 2023            "

## 2023-06-04 NOTE — H&P
[facility] ED Observation Admission Note    Chief Complaint   Patient presents with     Chest Pain       Assessment/Plan:  Travon Livingston is a 45 year old male admitted on 2/2/2021. He has a medical history significant for CAD s/p PCI (2013 and 2018) and hyperlipidemia who presents to the Emergency Department for evaluation of chest pain and nausea.      ##Chest pain  ##Left arm pain  ##History of CAD s/p PCI with 4 stents in RCA and LAD  47 yr old male with history of CAD s/p PCI with 4 stents in RCA and LAD, who presents for evaluation of chest pain.Yesterday, he was lying on the couch and he had a burning chest pain that started in his mid chest and radiated up to his bilateral jaw.  This morning he states he woke from sleep with pain in his left chest, shoulder and some numbness in the fingers.  Patient reports that he has 4 stents in his heart and the symptoms feel similar to his previous ischemic symptoms.  Pt underwent coronary angiography when he presented with similar symptoms 1/2020 and was found to have minimal in-stent restenosis. NM Lexiscan on 2/3/21 this showed no notable ischemia.HEART Score: 3. In the ED, VSS, although he was initially a little hypotensive. EKG shows NSR. Troponin negative. Chest Xray normal. Medications: Aspirin 324mg oral once. Plan: Admit to ED Observation for ACS Rule Out. Discussed with cardiology who reviewed patient's NM Lexiscan as well as coronary angiogram in 2020 as well as EKG and Troponin. Recommended outpatient Lexiscan. With patient's concerning symptoms, Lexiscan to be completed tomorrow.   -[place] to ED Observation  -VS Q4hrs  -Telemetry  -Serial troponins 9  - Lexiscan in am  -Will need a refill for Metoprolol 25 mg BID, rosuvastatin, ASA 81mg at discharge     ##HTN: Continue Metoprolol 25 mg BID, rosuvastatin     ##HLD: Continue rosuvastatin.        Diet: NPO  DVT Prophylaxis: Low Risk/Ambulatory with no VTE prophylaxis indicated  Colon Catheter: not present  Code  "Status: Full Code         HPI:  Per ED note, \"Travon Livingston is a 47 year old male who presents with chest pain.  Patient reports that he has been noting symptoms for the last couple of weeks most notably over the last couple of days.  He reports that couple of days ago he was out working out and he just felt very nauseous all of a sudden.  Yesterday he was lying on the couch and he had a burning chest pain that started in his mid chest and radiated up to his bilateral jaw.  This morning he states he woke from sleep with pain in his left chest, shoulder and some numbness in the fingers.  Patient reports that he has 4 stents in his heart and the symptoms feel similar to his previous ischemic symptoms.  He denies fevers, chills, cough or shortness of breath or other infectious symptoms.  He denies history of DVT or PE including. no recent prolonged immobilization, no lower extremity swelling or edema, no calf pain or tenderness, no hemoptysis.  Patient reports that he is supposed to be taking metoprolol and a medicine for his cholesterol though has not taken this for over a year.  Patient has nitro though did not try this for symptoms today.  He did not take an aspirin today.\"     Past Medical History  History:    Past Medical History:   Diagnosis Date     High cholesterol      Myocardial infarction (H)        Past Surgical History:   Procedure Laterality Date     coronary stints[  1/24/2013     CV CORONARY ANGIOGRAM  1/25/2020    Procedure: CV CORONARY ANGIOGRAM;  Surgeon: Giovanni Sears MD;  Location:  HEART CARDIAC CATH LAB     CV LEFT HEART CATH N/A 1/25/2020    Procedure: Left Heart Cath;  Surgeon: Giovanni Sears MD;  Location:  HEART CARDIAC CATH LAB       Family History   Problem Relation Age of Onset     C.A.D. Father      Myocardial Infarction Father         45     Myocardial Infarction Paternal Uncle         45     Myocardial Infarction Paternal Uncle        Social " History     Socioeconomic History     Marital status:      Spouse name: Not on file     Number of children: 2     Years of education: Not on file     Highest education level: Not on file   Occupational History     Occupation:    Tobacco Use     Smoking status: Former     Packs/day: 1.00     Years: 15.00     Pack years: 15.00     Types: Cigarettes     Quit date: 2011     Years since quittin.7     Smokeless tobacco: Never   Vaping Use     Vaping status: Not on file   Substance and Sexual Activity     Alcohol use: Yes     Comment: every couple days 1-2 beers/whiskey     Drug use: No     Sexual activity: Yes     Partners: Female   Other Topics Concern     Parent/sibling w/ CABG, MI or angioplasty before 65F 55M? Yes   Social History Narrative     Not on file     Social Determinants of Health     Financial Resource Strain: Not on file   Food Insecurity: Not on file   Transportation Needs: Not on file   Physical Activity: Not on file   Stress: Not on file   Social Connections: Not on file   Intimate Partner Violence: Not on file   Housing Stability: Not on file       No current facility-administered medications on file prior to encounter.  aspirin EC 81 MG EC tablet, Take 1 tablet by mouth daily. (Patient not taking: Reported on 2022)  metoprolol succinate ER (TOPROL XL) 25 MG 24 hr tablet, Take 1 tablet (25 mg) by mouth daily  rosuvastatin (CRESTOR) 40 MG tablet, Take 1 tablet (40 mg) by mouth daily        Data:    Results for orders placed or performed during the hospital encounter of 23   XR Chest 2 Views     Status: None    Narrative    EXAM: XR CHEST 2 VIEWS  LOCATION: [facility]  DATE/TIME: 2023 6:34 AM CDT    INDICATION: chest pain  COMPARISON: 02/10/2022      Impression    IMPRESSION: Cardiomediastinal silhouette within normal limits. No focal consolidation or pleural effusion.   [place] Draw     Status: None (In process)     Narrative    The following orders were created for panel order [place] Draw.  Procedure                               Abnormality         Status                     ---------                               -----------         ------                     Extra Blue Top Tube[[id number]]                              Final result               Extra Red Top Tube[[id number]]                               Final result               Extra Green Top (Lithium...[[id number]]                                                 Extra Purple Top Tube[[id number]]                                                         Please view results for these tests on the individual orders.   Comprehensive metabolic panel     Status: Abnormal   Result Value Ref Range    Sodium 139 136 - 145 mmol/L    Potassium 4.2 3.4 - 5.3 mmol/L    Chloride 104 98 - 107 mmol/L    Carbon Dioxide (CO2) 23 22 - 29 mmol/L    Anion Gap 12 7 - 15 mmol/L    Urea Nitrogen 11.5 6.0 - 20.0 mg/dL    Creatinine 1.04 0.67 - 1.17 mg/dL    Calcium 9.6 8.6 - 10.0 mg/dL    Glucose 101 (H) 70 - 99 mg/dL    Alkaline Phosphatase 89 40 - 129 U/L    AST 22 10 - 50 U/L    ALT 23 10 - 50 U/L    Protein Total 7.3 6.4 - 8.3 g/dL    Albumin 4.6 3.5 - 5.2 g/dL    Bilirubin Total 0.4 <=1.2 mg/dL    GFR Estimate 90 >60 mL/min/1.73m2   Troponin T, High Sensitivity     Status: Normal   Result Value Ref Range    Troponin T, High Sensitivity <6 <=22 ng/L   Extra Blue Top Tube     Status: None   Result Value Ref Range    Hold Specimen JIC    Extra Red Top Tube     Status: None   Result Value Ref Range    Hold Specimen JIC    CBC with platelets and differential     Status: None   Result Value Ref Range    WBC Count 6.9 4.0 - 11.0 10e3/uL    RBC Count 5.27 4.40 - 5.90 10e6/uL    Hemoglobin 16.3 13.3 - 17.7 g/dL    Hematocrit 49.0 40.0 - 53.0 %    MCV 93 78 - 100 fL    MCH 30.9 26.5 - 33.0 pg    MCHC 33.3 31.5 - 36.5 g/dL    RDW 12.5 10.0 - 15.0 %    Platelet Count 304 150 - 450 10e3/uL    % Neutrophils  59 %    % Lymphocytes 28 %    % Monocytes 10 %    % Eosinophils 2 %    % Basophils 1 %    % Immature Granulocytes 0 %    NRBCs per 100 WBC 0 <1 /100    Absolute Neutrophils 4.1 1.6 - 8.3 10e3/uL    Absolute Lymphocytes 1.9 0.8 - 5.3 10e3/uL    Absolute Monocytes 0.7 0.0 - 1.3 10e3/uL    Absolute Eosinophils 0.1 0.0 - 0.7 10e3/uL    Absolute Basophils 0.1 0.0 - 0.2 10e3/uL    Absolute Immature Granulocytes 0.0 <=0.4 10e3/uL    Absolute NRBCs 0.0 10e3/uL   Lipase     Status: Normal   Result Value Ref Range    Lipase 35 13 - 60 U/L   CBC with platelets differential     Status: None    Narrative    The following orders were created for panel order CBC with platelets differential.  Procedure                               Abnormality         Status                     ---------                               -----------         ------                     CBC with platelets and d...[[id number]]                      Final result                 Please view results for these tests on the individual orders.             EKG Interpretation:      Interpreted by Miguel Rizvi MD  Time reviewed: 0542  Symptoms at time of EKG: chest pain    Rhythm: normal sinus   Rate: normal  Axis: normal  Ectopy: none  Conduction: normal  ST Segments/ T Waves: No ST-T wave changes  Q Waves: none  Comparison to prior: Unchanged     Clinical Impression: normal EKG       ROS:    Review Of Systems  Skin: negative  Eyes: negative  Ears/Nose/Throat: negative  Respiratory: No shortness of breath, dyspnea on exertion, cough, or hemoptysis  Cardiovascular: chest pain  Gastrointestinal: negative  Genitourinary: negative  Musculoskeletal: Left arm pain  Neurologic: negative  Psychiatric: negative  Hematologic/Lymphatic/Immunologic: negative  Endocrine: negative    10 point ROS negative other than the symptoms noted above.      Exam:    Vitals:  B/P: 99/67, T: 98.4, P: 60, R: 16  General: awake, alert, NAD  Head: normal cephalic  HEENT: pupils equal,  conjugate gaze intact  Neck: Supple  CV: regular rate and rhythm without murmur; peripheral pulses equal bilaterally.  Lungs: clear to auscultation  Abd: soft, non-tender, no guarding, no peritoneal signs  EXT: lower extremities without swelling or edema  Neuro: awake, answers questions appropriately. No focal deficits noted     Signed:  [facility] [place], NP  [phone] Ex [id number]  June 4, 2023 at 7:32 AM

## 2023-06-04 NOTE — PLAN OF CARE
Goal Outcome Evaluation:  Serial troponins and stress test complete. No  - Seen and cleared by consultant if applicable. No  - Adequate pain control on oral analgesia. Yes  - Vital signs normal or at patient baseline. Yes  - Safe disposition plan has been identified. No

## 2023-06-04 NOTE — ED PROVIDER NOTES
ED Provider Note  [facility]      History     Chief Complaint   Patient presents with     Chest Pain     HPI  Travon Livingston is a 47 year old male who presents with chest pain.  Patient reports that he has been noting symptoms for the last couple of weeks most notably over the last couple of days.  He reports that couple of days ago he was out working out and he just felt very nauseous all of a sudden.  Yesterday he was lying on the couch and he had a burning chest pain that started in his mid chest and radiated up to his bilateral jaw.  This morning he states he woke from sleep with pain in his left chest, shoulder and some numbness in the fingers.  Patient reports that he has 4 stents in his heart and the symptoms feel similar to his previous ischemic symptoms.  He denies fevers, chills, cough or shortness of breath or other infectious symptoms.  He denies history of DVT or PE including. no recent prolonged immobilization, no lower extremity swelling or edema, no calf pain or tenderness, no hemoptysis.  Patient reports that he is supposed to be taking metoprolol and a medicine for his cholesterol though has not taken this for over a year.  Patient has nitro though did not try this for symptoms today.  He did not take an aspirin today.    Past Medical History  Past Medical History:   Diagnosis Date     High cholesterol      Myocardial infarction (H)      Past Surgical History:   Procedure Laterality Date     coronary stints[  1/24/2013     CV CORONARY ANGIOGRAM  1/25/2020    Procedure: CV CORONARY ANGIOGRAM;  Surgeon: Giovanni Sears MD;  Location:  HEART CARDIAC CATH LAB     CV LEFT HEART CATH N/A 1/25/2020    Procedure: Left Heart Cath;  Surgeon: Giovanni Sears MD;  Location:  HEART CARDIAC CATH LAB     aspirin (ASA) 81 MG EC tablet  aspirin 81 MG EC tablet  aspirin 81 MG EC tablet  metoprolol succinate ER (TOPROL XL) 25 MG 24 hr tablet  metoprolol  "succinate ER (TOPROL XL) 25 MG 24 hr tablet  omeprazole (PRILOSEC OTC) 20 MG EC tablet  rosuvastatin (CRESTOR) 40 MG tablet      No Known Allergies  Family History  Family History   Problem Relation Age of Onset     C.A.D. Father      Myocardial Infarction Father         45     Myocardial Infarction Paternal Uncle         45     Myocardial Infarction Paternal Uncle      Social History   Social History     Tobacco Use     Smoking status: Former     Packs/day: 1.00     Years: 15.00     Pack years: 15.00     Types: Cigarettes     Quit date: 2011     Years since quittin.7     Smokeless tobacco: Never   Substance Use Topics     Alcohol use: Yes     Comment: every couple days 1-2 beers/whiskey     Drug use: No         A medically appropriate review of systems was performed with pertinent positives and negatives noted in the HPI, and all other systems negative.    Physical Exam   BP: (!) 142/96  Pulse: 78  Temp: 98.4  F (36.9  C)  Resp: 16  Height: 190.5 cm (6' 3\")  Weight: 114.3 kg (252 lb)  SpO2: 99 %  Physical Exam  General: awake, alert, NAD  Head: normal cephalic  HEENT: pupils equal, conjugate gaze intact  Neck: Supple  CV: regular rate and rhythm without murmur; peripheral pulses equal bilaterally.  Lungs: clear to auscultation  Abd: soft, non-tender, no guarding, no peritoneal signs  EXT: lower extremities without swelling or edema  Neuro: awake, answers questions appropriately. No focal deficits noted         ED Course, Procedures, & Data      Procedures       ED Course Selections:        EKG Interpretation:      Interpreted by Miguel Rizvi MD  Time reviewed: 542  Symptoms at time of EKG: chest pain    Rhythm: normal sinus   Rate: normal  Axis: normal  Ectopy: none  Conduction: normal  ST Segments/ T Waves: No ST-T wave changes  Q Waves: none  Comparison to prior: Unchanged    Clinical Impression: normal EKG                     Results for orders placed or performed during the hospital encounter of " 06/04/23   XR Chest 2 Views     Status: None    Narrative    EXAM: XR CHEST 2 VIEWS  LOCATION: [facility]  DATE/TIME: 6/4/2023 6:34 AM CDT    INDICATION: chest pain  COMPARISON: 02/10/2022      Impression    IMPRESSION: Cardiomediastinal silhouette within normal limits. No focal consolidation or pleural effusion.   NM MPI Radiologist Consult For Cardiology     Status: None    Narrative    EXAMINATION: NM MPI RADIOLOGIST CONSULT FOR CARDIOLOGY, 6/5/2023 3:35  PM     COMPARISON: None.    HISTORY: History of CAD s/p stenting x 2 to LAD and stenting x 2 to  the RCA. He has hyperlipidemia and familial history of premature onset  ASCVD. He presented to the ED the night before last with intermittent  episodes of chest burning radiating to the jaw and nausea, somewhat  reminiscent of past angina. He has no acute changes on EKG. Troponin  x3 is normal. CXR, CBC and electrolytes were normal. He is awaiting  Lexiscan this afternoon.     TECHNIQUE: Limited field of view 5 mm CT images were acquired for  attenuation correction purposes for nuclear medicine cardiac study.  Radiology consulted to review the CT images. Please refer to separate  dictation for the nuclear medicine portion of the study.    FINDINGS:     Heart size is within normal limits. Normal caliber of the visualized  thoracic aorta and main pulmonary artery. Coronary artery stents. No  pericardial effusion. The visualized mediastinal and hilar lymph nodes  are not enlarged.    No pleural effusion or pneumothorax at this level. The visualized  lungs are clear.    Hepatic calcification adjacent to the gallbladder. No acute osseous  lesions of the visualized thorax.      Impression    IMPRESSION:  1. No acute abnormality on the CT images of the lower chest and upper  abdomen.  2. Please see separate report for the nuclear medicine portion of the  study.    I have personally reviewed the examination and initial  interpretation  and I agree with the findings.    AMITA HUTCHINSON MD         SYSTEM ID:  [id number]   Wann Draw     Status: None    Narrative    The following orders were created for panel order Wann Draw.  Procedure                               Abnormality         Status                     ---------                               -----------         ------                     Extra Blue Top Tube[[id number]]                              Final result               Extra Red Top Tube[[id number]]                               Final result               Extra Green Top (Lithium...[[id number]]                                                 Extra Purple Top Tube[[id number]]                                                         Please view results for these tests on the individual orders.   Comprehensive metabolic panel     Status: Abnormal   Result Value Ref Range    Sodium 139 136 - 145 mmol/L    Potassium 4.2 3.4 - 5.3 mmol/L    Chloride 104 98 - 107 mmol/L    Carbon Dioxide (CO2) 23 22 - 29 mmol/L    Anion Gap 12 7 - 15 mmol/L    Urea Nitrogen 11.5 6.0 - 20.0 mg/dL    Creatinine 1.04 0.67 - 1.17 mg/dL    Calcium 9.6 8.6 - 10.0 mg/dL    Glucose 101 (H) 70 - 99 mg/dL    Alkaline Phosphatase 89 40 - 129 U/L    AST 22 10 - 50 U/L    ALT 23 10 - 50 U/L    Protein Total 7.3 6.4 - 8.3 g/dL    Albumin 4.6 3.5 - 5.2 g/dL    Bilirubin Total 0.4 <=1.2 mg/dL    GFR Estimate 90 >60 mL/min/1.73m2   Troponin T, High Sensitivity     Status: Normal   Result Value Ref Range    Troponin T, High Sensitivity <6 <=22 ng/L   Extra Blue Top Tube     Status: None   Result Value Ref Range    Hold Specimen JIC    Extra Red Top Tube     Status: None   Result Value Ref Range    Hold Specimen JIC    CBC with platelets and differential     Status: None   Result Value Ref Range    WBC Count 6.9 4.0 - 11.0 10e3/uL    RBC Count 5.27 4.40 - 5.90 10e6/uL    Hemoglobin 16.3 13.3 - 17.7 g/dL    Hematocrit 49.0 40.0 - 53.0 %    MCV 93 78 - 100 fL     MCH 30.9 26.5 - 33.0 pg    MCHC 33.3 31.5 - 36.5 g/dL    RDW 12.5 10.0 - 15.0 %    Platelet Count 304 150 - 450 10e3/uL    % Neutrophils 59 %    % Lymphocytes 28 %    % Monocytes 10 %    % Eosinophils 2 %    % Basophils 1 %    % Immature Granulocytes 0 %    NRBCs per 100 WBC 0 <1 /100    Absolute Neutrophils 4.1 1.6 - 8.3 10e3/uL    Absolute Lymphocytes 1.9 0.8 - 5.3 10e3/uL    Absolute Monocytes 0.7 0.0 - 1.3 10e3/uL    Absolute Eosinophils 0.1 0.0 - 0.7 10e3/uL    Absolute Basophils 0.1 0.0 - 0.2 10e3/uL    Absolute Immature Granulocytes 0.0 <=0.4 10e3/uL    Absolute NRBCs 0.0 10e3/uL   Lipase     Status: Normal   Result Value Ref Range    Lipase 35 13 - 60 U/L   Troponin T, High Sensitivity     Status: Normal   Result Value Ref Range    Troponin T, High Sensitivity <6 <=22 ng/L   Extra Tube     Status: None    Narrative    The following orders were created for panel order Extra Tube.  Procedure                               Abnormality         Status                     ---------                               -----------         ------                     Extra Purple Top Tube[[id number]]                            Final result                 Please view results for these tests on the individual orders.   Extra Purple Top Tube     Status: None   Result Value Ref Range    Hold Specimen JIC    Troponin T, High Sensitivity     Status: Normal   Result Value Ref Range    Troponin T, High Sensitivity <6 <=22 ng/L   EKG 12-lead     Status: None   Result Value Ref Range    Systolic Blood Pressure  mmHg    Diastolic Blood Pressure  mmHg    Ventricular Rate 76 BPM    Atrial Rate 76 BPM    CO Interval 162 ms    QRS Duration 94 ms     ms    QTc 418 ms    P Axis 35 degrees    R AXIS -18 degrees    T Axis 2 degrees    Interpretation ECG       Sinus rhythm  Normal ECG    Unconfirmed report - interpretation of this ECG is computer generated - see medical record for final interpretation  Confirmed by - EMERGENCY ROOM,  PHYSICIAN ([id number]),  BORIS MELENDEZ ([id number]) on 6/5/2023 9:12:45 AM     NM Lexiscan stress test (nuc card)     Status: None   Result Value Ref Range    Target      Baseline Systolic      Baseline Diastolic BP 70     Last Stress Systolic      Last Stress Diastolic BP 58     Baseline HR 62 bpm    Max HR  86     Max Predicted HR  49 %    Rate Pressure Product 8,944.0     Narrative       The nuclear stress test is negative for inducible myocardial ischemia   or infarction.     LVEDv 147ml. LVESv 62ml. LV EF 58%.     A prior study was conducted on 2/3/2021.     CBC with platelets differential     Status: None    Narrative    The following orders were created for panel order CBC with platelets differential.  Procedure                               Abnormality         Status                     ---------                               -----------         ------                     CBC with platelets and d...[[id number]]                      Final result                 Please view results for these tests on the individual orders.   CBC with platelets differential *Canceled*     Status: None ()    Narrative    The following orders were created for panel order CBC with platelets differential.  Procedure                               Abnormality         Status                     ---------                               -----------         ------                       Please view results for these tests on the individual orders.   CBC with platelets differential *Canceled*     Status: None ()    Narrative    The following orders were created for panel order CBC with platelets differential.  Procedure                               Abnormality         Status                     ---------                               -----------         ------                     CBC with platelets and d...[[id number]]                                                   Please view results for these tests on the individual  orders.     Medications   aspirin (ASA) chewable tablet 324 mg (324 mg Oral $Given 6/4/23 0601)   aspirin (ASA) chewable tablet 162 mg (162 mg Oral $Given 6/4/23 1521)   technetium Tc 99m tetrofosmin 2UD study (MYOVIEW) radioisotope injection 3-42 millicurie (38 millicuries Intravenous $Given 6/5/23 1425)   regadenoson (LEXISCAN) injection 0.4 mg (0.4 mg Intravenous $Given 6/5/23 1423)     Labs Ordered and Resulted from Time of ED Arrival to Time of ED Departure   COMPREHENSIVE METABOLIC PANEL - Abnormal       Result Value    Sodium 139      Potassium 4.2      Chloride 104      Carbon Dioxide (CO2) 23      Anion Gap 12      Urea Nitrogen 11.5      Creatinine 1.04      Calcium 9.6      Glucose 101 (*)     Alkaline Phosphatase 89      AST 22      ALT 23      Protein Total 7.3      Albumin 4.6      Bilirubin Total 0.4      GFR Estimate 90     TROPONIN T, HIGH SENSITIVITY - Normal    Troponin T, High Sensitivity <6     LIPASE - Normal    Lipase 35     CBC WITH PLATELETS AND DIFFERENTIAL    WBC Count 6.9      RBC Count 5.27      Hemoglobin 16.3      Hematocrit 49.0      MCV 93      MCH 30.9      MCHC 33.3      RDW 12.5      Platelet Count 304      % Neutrophils 59      % Lymphocytes 28      % Monocytes 10      % Eosinophils 2      % Basophils 1      % Immature Granulocytes 0      NRBCs per 100 WBC 0      Absolute Neutrophils 4.1      Absolute Lymphocytes 1.9      Absolute Monocytes 0.7      Absolute Eosinophils 0.1      Absolute Basophils 0.1      Absolute Immature Granulocytes 0.0      Absolute NRBCs 0.0       NM Lexiscan stress test (nuc card)   Final Result      NM MPI Radiologist Consult For Cardiology   Final Result   IMPRESSION:   1. No acute abnormality on the CT images of the lower chest and upper   abdomen.   2. Please see separate report for the nuclear medicine portion of the   study.      I have personally reviewed the examination and initial interpretation   and I agree with the findings.      AMITA  "MD EVANGELINA            SYSTEM ID:  [id number]      XR Chest 2 Views   Final Result   IMPRESSION: Cardiomediastinal silhouette within normal limits. No focal consolidation or pleural effusion.             Critical care was not performed.     Medical Decision Making  The patient's presentation was of high complexity (an acute health issue posing potential threat to life or bodily function).    The patient's evaluation involved:  review of external note(s) from 3+ sources (see separate area of note for details)  ordering and/or review of 3+ test(s) in this encounter (see separate area of note for details)  review of 3+ test result(s) ordered prior to this encounter (see separate area of note for details)  discussion of management or test interpretation with another health professional (see separate area of note for details)    The patient's management necessitated high risk (a decision regarding hospitalization).      Assessment & Plan    Javon is a 47-year-old male who presents to the emergency department chest pain.  Patient endorses that he had some left-sided chest \"burning\" that was associated with left shoulder pain and some finger tingling.  This is in the setting of multiple episodes of chest pain and nausea with exertion in the preceding weeks.    On exam he appears nervous but otherwise an unremarkable heart and lung exam.    EKG was obtained upon arrival which shows normal sinus rhythm and no signs of acute ischemia.    Concern would be for ACS (angina, unstable angina, NSTEMI, STEMI), would also consider things like GERD, chest wall pain.  Patient does not appear to have any risk factors for PE.  Symptoms do not sound infectious at this time.  Think dissection is less likely given the description of pain.    Patient will be given nitro and aspirin given his symptoms.    Patient notes that symptoms resolved with nitroglycerin.    Patient's EKG showed no signs of acute ischemia.  Troponin was negative.  " Electrolytes are normal.  White count is normal, no left shift.  Lipase is normal.  Chest x-ray unremarkable.    Given his significantly high risk and escalating symptoms recently would be concerned about unstable angina.  Patient be admitted to ED observation with plan for cardiology consult and additional stress testing/work-up per cardiology.    I have reviewed the nursing notes. I have reviewed the findings, diagnosis, plan and need for follow up with the patient.    Discharge Medication List as of 6/5/2023  5:03 PM      START taking these medications    Details   omeprazole (PRILOSEC OTC) 20 MG EC tablet Take 1 tablet (20 mg) by mouth daily, Disp-30 tablet, R-0, E-Prescribe             Final diagnoses:   S/P angioplasty with stent   Coronary artery disease without angina pectoris, unspecified vessel or lesion type, unspecified whether native or transplanted heart   CAD (coronary artery disease)   Epigastric pain   Gastroesophageal reflux disease without esophagitis   Atypical chest pain   Other chest pain   Family history of ASCVD   Chest pain, unspecified type   CAD S/P percutaneous coronary angioplasty   Hyperlipidemia LDL goal <70   Benign essential hypertension   Unstable angina pectoris (H)       Miguel Rizvi  [facility] EMERGENCY DEPARTMENT  6/4/2023     Miguel Rizvi MD  06/06/23 [id number]

## 2023-06-04 NOTE — PROGRESS NOTES
"Patient interviewed and examined. Labs, imaging and chart notes reviewed.  Travon Livingston presented to the ED with intermittant chest pain over the past couple of weeks which he feels is similar to his past cardiac angina. He has a history of hyperlipidemia, CAD with stents to the mid and distal LAD and balloon angioplasty of D2 and proximal and mid RCA stents. He has family history of premature cardiac disease. He had nausea after working out and had some subsequent burning mid chest pain radiating to the jaw while laying on the couch.    In the ED EKG had no acute changes. Blood pressure was 142/96. CBC, electrolytes, LFTs lipase and troponin were normal. CXR was clear. He has had no further pain since arrival.    Past Medical History:   Diagnosis Date     High cholesterol      Myocardial infarction (H)        Past Surgical History:   Procedure Laterality Date     coronary stints[  2013     CV CORONARY ANGIOGRAM  2020    Procedure: CV CORONARY ANGIOGRAM;  Surgeon: Giovanni Sears MD;  Location:  [facility]     CV LEFT HEART CATH N/A 2020    Procedure: Left Heart Cath;  Surgeon: Giovanni Sears MD;  [facility]       Family History   Problem Relation Age of Onset     C.A.D. Father      Myocardial Infarction Father         45     Myocardial Infarction Paternal Uncle         45     Myocardial Infarction Paternal Uncle        Social History     Tobacco Use     Smoking status: Former     Packs/day: 1.00     Years: 15.00     Pack years: 15.00     Types: Cigarettes     Quit date: 2011     Years since quittin.7     Smokeless tobacco: Never   Vaping Use     Vaping status: Not on file   Substance Use Topics     Alcohol use: Yes     Comment: every couple days 1-2 beers/whiskey     Physical Exam:  Middle aged male in NAD.  /89 (BP Location: Right arm)   Pulse 61   Temp 98.8  F (37.1  C) (Oral)   Resp 16   Ht 1.905 m (6' 3\")  "  Wt 114.3 kg (252 lb)   SpO2 97%   BMI 31.50 kg/m    HEENT: PERRLA. EOMI. Anicteric.  Neck:no mass or bruit.  Lungs: Clear.  Cardiac: RRR. Normal S1 and S2. No JVD.. No murmur.  Abdomen: Soft, non tender.   Exterm: No edema.  Neuro: CN, speech, motor, coordination.  Psych: Normal mood and affect.    Results for orders placed or performed during the hospital encounter of 06/04/23 (from the past 48 hour(s))   [place] Draw    Narrative    The following orders were created for panel order [place] Draw.  Procedure                               Abnormality         Status                     ---------                               -----------         ------                     Extra Blue Top Tube[[id number]]                              Final result               Extra Red Top Tube[[id number]]                               Final result               Extra Green Top (Lithium...[[id number]]                                                 Extra Purple Top Tube[[id number]]                                                         Please view results for these tests on the individual orders.   CBC with platelets differential    Narrative    The following orders were created for panel order CBC with platelets differential.  Procedure                               Abnormality         Status                     ---------                               -----------         ------                     CBC with platelets and d...[[id number]]                      Final result                 Please view results for these tests on the individual orders.   Comprehensive metabolic panel   Result Value Ref Range    Sodium 139 136 - 145 mmol/L    Potassium 4.2 3.4 - 5.3 mmol/L    Chloride 104 98 - 107 mmol/L    Carbon Dioxide (CO2) 23 22 - 29 mmol/L    Anion Gap 12 7 - 15 mmol/L    Urea Nitrogen 11.5 6.0 - 20.0 mg/dL    Creatinine 1.04 0.67 - 1.17 mg/dL    Calcium 9.6 8.6 - 10.0 mg/dL    Glucose 101 (H) 70 - 99 mg/dL    Alkaline Phosphatase 89 40  - 129 U/L    AST 22 10 - 50 U/L    ALT 23 10 - 50 U/L    Protein Total 7.3 6.4 - 8.3 g/dL    Albumin 4.6 3.5 - 5.2 g/dL    Bilirubin Total 0.4 <=1.2 mg/dL    GFR Estimate 90 >60 mL/min/1.73m2   Troponin T, High Sensitivity   Result Value Ref Range    Troponin T, High Sensitivity <6 <=22 ng/L   Extra Blue Top Tube   Result Value Ref Range    Hold Specimen JIC    Extra Red Top Tube   Result Value Ref Range    Hold Specimen JIC    CBC with platelets and differential   Result Value Ref Range    WBC Count 6.9 4.0 - 11.0 10e3/uL    RBC Count 5.27 4.40 - 5.90 10e6/uL    Hemoglobin 16.3 13.3 - 17.7 g/dL    Hematocrit 49.0 40.0 - 53.0 %    MCV 93 78 - 100 fL    MCH 30.9 26.5 - 33.0 pg    MCHC 33.3 31.5 - 36.5 g/dL    RDW 12.5 10.0 - 15.0 %    Platelet Count 304 150 - 450 10e3/uL    % Neutrophils 59 %    % Lymphocytes 28 %    % Monocytes 10 %    % Eosinophils 2 %    % Basophils 1 %    % Immature Granulocytes 0 %    NRBCs per 100 WBC 0 <1 /100    Absolute Neutrophils 4.1 1.6 - 8.3 10e3/uL    Absolute Lymphocytes 1.9 0.8 - 5.3 10e3/uL    Absolute Monocytes 0.7 0.0 - 1.3 10e3/uL    Absolute Eosinophils 0.1 0.0 - 0.7 10e3/uL    Absolute Basophils 0.1 0.0 - 0.2 10e3/uL    Absolute Immature Granulocytes 0.0 <=0.4 10e3/uL    Absolute NRBCs 0.0 10e3/uL   CBC with platelets differential *Canceled*    Narrative    The following orders were created for panel order CBC with platelets differential.  Procedure                               Abnormality         Status                     ---------                               -----------         ------                     CBC with platelets and d...[[id number]]                                                   Please view results for these tests on the individual orders.   Lipase   Result Value Ref Range    Lipase 35 13 - 60 U/L   CBC with platelets differential *Canceled*    Narrative    The following orders were created for panel order CBC with platelets differential.  Procedure                                Abnormality         Status                     ---------                               -----------         ------                       Please view results for these tests on the individual orders.   XR Chest 2 Views    Narrative    EXAM: XR CHEST 2 VIEWS  LOCATION: [facility]  DATE/TIME: 6/4/2023 6:34 AM CDT    INDICATION: chest pain  COMPARISON: 02/10/2022      Impression    IMPRESSION: Cardiomediastinal silhouette within normal limits. No focal consolidation or pleural effusion.   Troponin T, High Sensitivity   Result Value Ref Range    Troponin T, High Sensitivity <6 <=22 ng/L   Extra Tube    Narrative    The following orders were created for panel order Extra Tube.  Procedure                               Abnormality         Status                     ---------                               -----------         ------                     Extra Purple Top Tube[[id number]]                            Final result                 Please view results for these tests on the individual orders.   Extra Purple Top Tube   Result Value Ref Range    Hold Specimen JIC      Impression:  Young male with hyperlipidemia, strong family history of early onset CAD and personal history of CAD with multiple stents in the LAD and RCA. He presents with nausea after exertion and burning pain in the precordium which occurred at rest,  radiating to the jaw, over the past week similar to past angina symptoms. He has normal troponin x 2 and no acute changes on EKG. LFTs and lipase are normal CXR is clear. He has no symptoms or signs of DVT. PE seems unlikely. Cardiology recommends Lexiscan as test of choice to assess for progressive CAD. We discussed that this cannot be obtained on Sunday, but could be done in the AM. He is agreeable to staying for the test.    Lázaro Valentino MD

## 2023-06-05 ENCOUNTER — APPOINTMENT (OUTPATIENT)
Dept: NUCLEAR MEDICINE | Facility: CLINIC | Age: 48
End: 2023-06-05
Attending: NURSE PRACTITIONER
Payer: COMMERCIAL

## 2023-06-05 ENCOUNTER — APPOINTMENT (OUTPATIENT)
Dept: CARDIOLOGY | Facility: CLINIC | Age: 48
End: 2023-06-05
Attending: NURSE PRACTITIONER
Payer: COMMERCIAL

## 2023-06-05 VITALS
BODY MASS INDEX: 31.33 KG/M2 | OXYGEN SATURATION: 100 % | WEIGHT: 252 LBS | DIASTOLIC BLOOD PRESSURE: 75 MMHG | SYSTOLIC BLOOD PRESSURE: 102 MMHG | TEMPERATURE: 97.8 F | RESPIRATION RATE: 16 BRPM | HEART RATE: 56 BPM | HEIGHT: 75 IN

## 2023-06-05 LAB
ATRIAL RATE - MUSE: 76 BPM
CV STRESS MAX HR HE: 86
DIASTOLIC BLOOD PRESSURE - MUSE: NORMAL MMHG
INTERPRETATION ECG - MUSE: NORMAL
P AXIS - MUSE: 35 DEGREES
PR INTERVAL - MUSE: 162 MS
QRS DURATION - MUSE: 94 MS
QT - MUSE: 372 MS
QTC - MUSE: 418 MS
R AXIS - MUSE: -18 DEGREES
RATE PRESSURE PRODUCT: 8944
STRESS ECHO BASELINE DIASTOLIC HE: 70
STRESS ECHO BASELINE HR: 62 BPM
STRESS ECHO BASELINE SYSTOLIC BP: 110
STRESS ECHO CALCULATED PERCENT HR: 49 %
STRESS ECHO LAST STRESS DIASTOLIC BP: 58
STRESS ECHO LAST STRESS SYSTOLIC BP: 104
STRESS ECHO TARGET HR: 175
SYSTOLIC BLOOD PRESSURE - MUSE: NORMAL MMHG
T AXIS - MUSE: 2 DEGREES
VENTRICULAR RATE- MUSE: 76 BPM

## 2023-06-05 PROCEDURE — 99238 HOSP IP/OBS DSCHRG MGMT 30/<: CPT | Performed by: INTERNAL MEDICINE

## 2023-06-05 PROCEDURE — 343N000001 HC RX 343: Performed by: INTERNAL MEDICINE

## 2023-06-05 PROCEDURE — 96376 TX/PRO/DX INJ SAME DRUG ADON: CPT

## 2023-06-05 PROCEDURE — 93018 CV STRESS TEST I&R ONLY: CPT | Performed by: INTERNAL MEDICINE

## 2023-06-05 PROCEDURE — 250N000011 HC RX IP 250 OP 636: Performed by: INTERNAL MEDICINE

## 2023-06-05 PROCEDURE — 250N000011 HC RX IP 250 OP 636: Performed by: NURSE PRACTITIONER

## 2023-06-05 PROCEDURE — 250N000013 HC RX MED GY IP 250 OP 250 PS 637: Performed by: NURSE PRACTITIONER

## 2023-06-05 PROCEDURE — G0378 HOSPITAL OBSERVATION PER HR: HCPCS

## 2023-06-05 PROCEDURE — 78452 HT MUSCLE IMAGE SPECT MULT: CPT | Mod: 26 | Performed by: INTERNAL MEDICINE

## 2023-06-05 PROCEDURE — 93016 CV STRESS TEST SUPVJ ONLY: CPT | Performed by: INTERNAL MEDICINE

## 2023-06-05 PROCEDURE — A9502 TC99M TETROFOSMIN: HCPCS | Performed by: INTERNAL MEDICINE

## 2023-06-05 PROCEDURE — 78452 HT MUSCLE IMAGE SPECT MULT: CPT

## 2023-06-05 PROCEDURE — C9113 INJ PANTOPRAZOLE SODIUM, VIA: HCPCS | Performed by: NURSE PRACTITIONER

## 2023-06-05 PROCEDURE — 93017 CV STRESS TEST TRACING ONLY: CPT

## 2023-06-05 RX ORDER — ASPIRIN 81 MG/1
81 TABLET ORAL DAILY
Qty: 30 TABLET | Refills: 0 | Status: SHIPPED | OUTPATIENT
Start: 2023-06-05 | End: 2024-03-28

## 2023-06-05 RX ORDER — AMINOPHYLLINE 25 MG/ML
50-100 INJECTION, SOLUTION INTRAVENOUS
Status: DISCONTINUED | OUTPATIENT
Start: 2023-06-05 | End: 2023-06-05

## 2023-06-05 RX ORDER — OMEPRAZOLE 20 MG/1
20 TABLET, DELAYED RELEASE ORAL DAILY
Qty: 30 TABLET | Refills: 0 | Status: SHIPPED | OUTPATIENT
Start: 2023-06-05 | End: 2024-03-28

## 2023-06-05 RX ORDER — METOPROLOL SUCCINATE 25 MG/1
25 TABLET, EXTENDED RELEASE ORAL 2 TIMES DAILY
Qty: 60 TABLET | Refills: 0 | Status: SHIPPED | OUTPATIENT
Start: 2023-06-05 | End: 2024-03-28 | Stop reason: DRUGHIGH

## 2023-06-05 RX ORDER — OMEPRAZOLE 20 MG/1
20 TABLET, DELAYED RELEASE ORAL DAILY
Qty: 30 TABLET | Refills: 0 | Status: CANCELLED | OUTPATIENT
Start: 2023-06-05

## 2023-06-05 RX ORDER — METOPROLOL SUCCINATE 25 MG/1
25 TABLET, EXTENDED RELEASE ORAL 2 TIMES DAILY
Qty: 60 TABLET | Refills: 0 | Status: SHIPPED | OUTPATIENT
Start: 2023-06-05 | End: 2024-03-28

## 2023-06-05 RX ORDER — REGADENOSON 0.08 MG/ML
0.4 INJECTION, SOLUTION INTRAVENOUS ONCE
Status: COMPLETED | OUTPATIENT
Start: 2023-06-05 | End: 2023-06-05

## 2023-06-05 RX ORDER — ROSUVASTATIN CALCIUM 40 MG/1
40 TABLET, COATED ORAL DAILY
Qty: 30 TABLET | Refills: 0 | Status: SHIPPED | OUTPATIENT
Start: 2023-06-05 | End: 2024-03-28

## 2023-06-05 RX ORDER — ROSUVASTATIN CALCIUM 40 MG/1
40 TABLET, COATED ORAL DAILY
Qty: 30 TABLET | Refills: 0 | Status: CANCELLED | OUTPATIENT
Start: 2023-06-05

## 2023-06-05 RX ORDER — ACYCLOVIR 200 MG/1
0-1 CAPSULE ORAL
Status: DISCONTINUED | OUTPATIENT
Start: 2023-06-05 | End: 2023-06-05

## 2023-06-05 RX ORDER — CAFFEINE CITRATE 20 MG/ML
60 SOLUTION INTRAVENOUS
Status: DISCONTINUED | OUTPATIENT
Start: 2023-06-05 | End: 2023-06-05

## 2023-06-05 RX ORDER — METOPROLOL SUCCINATE 25 MG/1
25 TABLET, EXTENDED RELEASE ORAL DAILY
Qty: 30 TABLET | Refills: 0 | Status: CANCELLED | OUTPATIENT
Start: 2023-06-05

## 2023-06-05 RX ORDER — METOPROLOL SUCCINATE 25 MG/1
25 TABLET, EXTENDED RELEASE ORAL 2 TIMES DAILY
Qty: 60 TABLET | Refills: 0 | Status: CANCELLED | OUTPATIENT
Start: 2023-06-05 | End: 2023-07-05

## 2023-06-05 RX ORDER — ALBUTEROL SULFATE 90 UG/1
2 AEROSOL, METERED RESPIRATORY (INHALATION) EVERY 5 MIN PRN
Status: DISCONTINUED | OUTPATIENT
Start: 2023-06-05 | End: 2023-06-05

## 2023-06-05 RX ADMIN — PANTOPRAZOLE SODIUM 40 MG: 40 INJECTION, POWDER, FOR SOLUTION INTRAVENOUS at 08:31

## 2023-06-05 RX ADMIN — ROSUVASTATIN CALCIUM 40 MG: 40 TABLET, COATED ORAL at 08:30

## 2023-06-05 RX ADMIN — TETROFOSMIN 38 MILLICURIE: 1.38 INJECTION, POWDER, LYOPHILIZED, FOR SOLUTION INTRAVENOUS at 14:25

## 2023-06-05 RX ADMIN — TETROFOSMIN 10.9 MILLICURIE: 1.38 INJECTION, POWDER, LYOPHILIZED, FOR SOLUTION INTRAVENOUS at 13:30

## 2023-06-05 RX ADMIN — METOPROLOL SUCCINATE 25 MG: 25 TABLET, EXTENDED RELEASE ORAL at 08:30

## 2023-06-05 RX ADMIN — REGADENOSON 0.4 MG: 0.08 INJECTION, SOLUTION INTRAVENOUS at 14:23

## 2023-06-05 RX ADMIN — ASPIRIN 81 MG: 81 TABLET ORAL at 08:30

## 2023-06-05 ASSESSMENT — ACTIVITIES OF DAILY LIVING (ADL)
ADLS_ACUITY_SCORE: 31

## 2023-06-05 NOTE — PLAN OF CARE
"Observation Goals:     - Serial troponins and stress test complete: not met, lexiscan stress test in AM  - Seen and cleared by consultant if applicable: not met  - Adequate pain control on oral analgesia: met, denies pain  - Vital signs normal or at patient baseline: met, vss  - Safe disposition plan has been identified: not met    /78 (BP Location: Right arm)   Pulse 56   Temp 97.9  F (36.6  C) (Oral)   Resp 16   Ht 1.905 m (6' 3\")   Wt 114.3 kg (252 lb)   SpO2 96%   BMI 31.50 kg/m      "

## 2023-06-05 NOTE — PLAN OF CARE
"Observation Goals:    - Serial troponins and stress test complete: not met, lexiscan stress test in AM  - Seen and cleared by consultant if applicable: not met  - Adequate pain control on oral analgesia: met, denies pain  - Vital signs normal or at patient baseline: met, vss  - Safe disposition plan has been identified: not met    /81 (BP Location: Right arm)   Pulse 68   Temp 98.2  F (36.8  C) (Oral)   Resp 16   Ht 1.905 m (6' 3\")   Wt 114.3 kg (252 lb)   SpO2 97%   BMI 31.50 kg/m      "

## 2023-06-05 NOTE — PLAN OF CARE
Observation Goals:     - Serial troponins and stress test complete: not met, lexiscan stress test scheduled for 1 pm.   - Seen and cleared by consultant if applicable: not met  - Adequate pain control on oral analgesia: met, denies pain  - Vital signs normal or at patient baseline: met, vss  - Safe disposition plan has been identified: not met

## 2023-06-05 NOTE — PROGRESS NOTES
"Patient interviewed and examined. Labs, imaging and chart notes reviewed.  Travon Livingston has history of CAD s/p stenting x 2 to LAD and stenting x 2 to the RCA. He has hyperlipidemia and familial history of premature onset ASCVD. He presented to the ED the night before last with intermittent episodes of chest burning radiating to the jaw and nausea, somewhat reminiscent of past angina. He has no acute changes on EKG. Troponin x3 is normal. CXR, CBC and electrolytes were normal. He is awaiting Lexiscan this afternoon. He has some nausea and heartburn sensation this morning.    Past Medical History:   Diagnosis Date     High cholesterol      Myocardial infarction (H)        Past Surgical History:   Procedure Laterality Date     coronary stints[  2013     CV CORONARY ANGIOGRAM  2020    Procedure: CV CORONARY ANGIOGRAM;  Surgeon: Giovanni Sears MD;  Location:  [facility]     CV LEFT HEART CATH N/A 2020    Procedure: Left Heart Cath;  Surgeon: Giovanni Sears MD;  Location:  [facility]       Family History   Problem Relation Age of Onset     C.A.D. Father      Myocardial Infarction Father         45     Myocardial Infarction Paternal Uncle         45     Myocardial Infarction Paternal Uncle        Social History     Tobacco Use     Smoking status: Former     Packs/day: 1.00     Years: 15.00     Pack years: 15.00     Types: Cigarettes     Quit date: 2011     Years since quittin.7     Smokeless tobacco: Never   Vaping Use     Vaping status: Not on file   Substance Use Topics     Alcohol use: Yes     Comment: every couple days 1-2 beers/whiskey     Physical Exam:  Middle aged male in NAD.  /75 (BP Location: Right arm)   Pulse 56   Temp 97.8  F (36.6  C) (Oral)   Resp 16   Ht 1.905 m (6' 3\")   Wt 114.3 kg (252 lb)   SpO2 100%   BMI 31.50 kg/m    HEENT: PERRLA. EOMI.   Neck: No mass or bruit.  Lungs: Clear to " auscultation.'Cardiac: RRR. Normal S1 and S2. No murmurs.  Abdomen: Soft, non tender.  Extrem: No edema.    Results for orders placed or performed during the hospital encounter of 06/04/23 (from the past 48 hour(s))   EKG 12-lead   Result Value Ref Range    Systolic Blood Pressure  mmHg    Diastolic Blood Pressure  mmHg    Ventricular Rate 76 BPM    Atrial Rate 76 BPM    WI Interval 162 ms    QRS Duration 94 ms     ms    QTc 418 ms    P Axis 35 degrees    R AXIS -18 degrees    T Axis 2 degrees    Interpretation ECG       Sinus rhythm  Normal ECG    Unconfirmed report - interpretation of this ECG is computer generated - see medical record for final interpretation  Confirmed by - EMERGENCY ROOM, PHYSICIAN ([id number]),  BORIS MELENDEZ ([id number]) on 6/5/2023 9:12:45 AM     [place] Draw    Narrative    The following orders were created for panel order [place] Draw.  Procedure                               Abnormality         Status                     ---------                               -----------         ------                     Extra Blue Top Tube[[id number]]                              Final result               Extra Red Top Tube[[id number]]                               Final result               Extra Green Top (Lithium...[[id number]]                                                 Extra Purple Top Tube[[id number]]                                                         Please view results for these tests on the individual orders.   CBC with platelets differential    Narrative    The following orders were created for panel order CBC with platelets differential.  Procedure                               Abnormality         Status                     ---------                               -----------         ------                     CBC with platelets and d...[[id number]]                      Final result                 Please view results for these tests on the individual orders.   Comprehensive  metabolic panel   Result Value Ref Range    Sodium 139 136 - 145 mmol/L    Potassium 4.2 3.4 - 5.3 mmol/L    Chloride 104 98 - 107 mmol/L    Carbon Dioxide (CO2) 23 22 - 29 mmol/L    Anion Gap 12 7 - 15 mmol/L    Urea Nitrogen 11.5 6.0 - 20.0 mg/dL    Creatinine 1.04 0.67 - 1.17 mg/dL    Calcium 9.6 8.6 - 10.0 mg/dL    Glucose 101 (H) 70 - 99 mg/dL    Alkaline Phosphatase 89 40 - 129 U/L    AST 22 10 - 50 U/L    ALT 23 10 - 50 U/L    Protein Total 7.3 6.4 - 8.3 g/dL    Albumin 4.6 3.5 - 5.2 g/dL    Bilirubin Total 0.4 <=1.2 mg/dL    GFR Estimate 90 >60 mL/min/1.73m2   Troponin T, High Sensitivity   Result Value Ref Range    Troponin T, High Sensitivity <6 <=22 ng/L   Extra Blue Top Tube   Result Value Ref Range    Hold Specimen JIC    Extra Red Top Tube   Result Value Ref Range    Hold Specimen JIC    CBC with platelets and differential   Result Value Ref Range    WBC Count 6.9 4.0 - 11.0 10e3/uL    RBC Count 5.27 4.40 - 5.90 10e6/uL    Hemoglobin 16.3 13.3 - 17.7 g/dL    Hematocrit 49.0 40.0 - 53.0 %    MCV 93 78 - 100 fL    MCH 30.9 26.5 - 33.0 pg    MCHC 33.3 31.5 - 36.5 g/dL    RDW 12.5 10.0 - 15.0 %    Platelet Count 304 150 - 450 10e3/uL    % Neutrophils 59 %    % Lymphocytes 28 %    % Monocytes 10 %    % Eosinophils 2 %    % Basophils 1 %    % Immature Granulocytes 0 %    NRBCs per 100 WBC 0 <1 /100    Absolute Neutrophils 4.1 1.6 - 8.3 10e3/uL    Absolute Lymphocytes 1.9 0.8 - 5.3 10e3/uL    Absolute Monocytes 0.7 0.0 - 1.3 10e3/uL    Absolute Eosinophils 0.1 0.0 - 0.7 10e3/uL    Absolute Basophils 0.1 0.0 - 0.2 10e3/uL    Absolute Immature Granulocytes 0.0 <=0.4 10e3/uL    Absolute NRBCs 0.0 10e3/uL   CBC with platelets differential *Canceled*    Narrative    The following orders were created for panel order CBC with platelets differential.  Procedure                               Abnormality         Status                     ---------                               -----------         ------                      CBC with platelets and d...[[id number]]                                                   Please view results for these tests on the individual orders.   Lipase   Result Value Ref Range    Lipase 35 13 - 60 U/L   CBC with platelets differential *Canceled*    Narrative    The following orders were created for panel order CBC with platelets differential.  Procedure                               Abnormality         Status                     ---------                               -----------         ------                       Please view results for these tests on the individual orders.   XR Chest 2 Views    Narrative    EXAM: XR CHEST 2 VIEWS  LOCATION: [facility]  DATE/TIME: 6/4/2023 6:34 AM CDT    INDICATION: chest pain  COMPARISON: 02/10/2022      Impression    IMPRESSION: Cardiomediastinal silhouette within normal limits. No focal consolidation or pleural effusion.   Troponin T, High Sensitivity   Result Value Ref Range    Troponin T, High Sensitivity <6 <=22 ng/L   Extra Tube    Narrative    The following orders were created for panel order Extra Tube.  Procedure                               Abnormality         Status                     ---------                               -----------         ------                     Extra Purple Top Tube[[id number]]                            Final result                 Please view results for these tests on the individual orders.   Extra Purple Top Tube   Result Value Ref Range    Hold Specimen JIC    Troponin T, High Sensitivity   Result Value Ref Range    Troponin T, High Sensitivity <6 <=22 ng/L     Impression:  Middle aged man with history of coronary artery disease and hyperlipidemia with prior stenting of the LAD and RCA presents with 2 weeks of somewhat atypical symptoms of nausea and heartburn sensation. EKG and serial troponin has been normal. He is scheduled for Lexiscan this afternoon. If abnormal cardiology  will be further consulted. If normal, he can be discharge on PPI for suspected GERD.    Lázaro Valentino MD

## 2023-06-05 NOTE — PROGRESS NOTES
Pt here for Lexiscan nuclear stress test. Medication and side effects reviewed with patient. Lung sounds clear to auscultation bilaterally. Denied caffeine use. Patient tolerated Lexiscan dose without any adverse reactions. VSS. Monitored post injection and then taken to nuclear medicine for follow up imaging.

## 2023-06-05 NOTE — PROGRESS NOTES
[facility]    ED Observation Progress Note - ED Observation  Date of Admission:  6/4/2023    Assessment & Plan     Travon Livingston is a 45 year old male admitted on 2/2/2021. He has a medical history significant for CAD s/p PCI (2013 and 2018) and hyperlipidemia who presents to the Emergency Department for evaluation of chest pain and nausea.      ##Chest pain  ##Left arm pain  ##History of CAD s/p PCI with 4 stents in RCA and LAD  47 yr old male with history of CAD s/p PCI with 4 stents in RCA and LAD, who presents for evaluation of chest pain.Yesterday, he was lying on the couch and he had a burning chest pain that started in his mid chest and radiated up to his bilateral jaw.  This morning he states he woke from sleep with pain in his left chest, shoulder and some numbness in the fingers.  Patient reports that he has 4 stents in his heart and the symptoms feel similar to his previous ischemic symptoms.  Pt underwent coronary angiography when he presented with similar symptoms 1/2020 and was found to have minimal in-stent restenosis. NM Lexiscan on 2/3/21 this showed no notable ischemia.HEART Score: 3. In the ED, VSS, although he was initially a little hypotensive. EKG shows NSR. Troponin negative. Chest Xray normal. Medications: Aspirin 324mg oral once. Plan: Admit to ED Observation for ACS Rule Out. Discussed with cardiology who reviewed patient's NM Lexiscan as well as coronary angiogram in 2020 as well as EKG and Troponin. No events overnight. Plan for Lexiscan today. Discharge pending results. Patient denies chest pain or SOB this morning. Denies any symptoms.  -VS Q4hrs  -Telemetry  - Lexiscan today  -Will need a refill for Metoprolol 25 mg BID, rosuvastatin, ASA 81mg at discharge     ##HTN: Continue Metoprolol 25 mg BID, rosuvastatin     ##HLD: Continue rosuvastatin.       Diet: NPO for Medical/Clinical Reasons Except for: Meds    DVT Prophylaxis: Low  "Risk/Ambulatory with no VTE prophylaxis indicated  Colon Catheter: Not present  Lines: None     Cardiac Monitoring: ACTIVE order. Indication: Chest pain/ ACS rule out (24 hours)  Code Status: Full Code      Clinically Significant Risk Factors Present on Admission                # Drug Induced Platelet Defect: home medication list includes an antiplatelet medication        # Obesity: Estimated body mass index is 31.5 kg/m  as calculated from the following:    Height as of this encounter: 1.905 m (6' 3\").    Weight as of this encounter: 114.3 kg (252 lb).            Disposition Plan      Expected Discharge Date: 06/05/2023                The patient's care was discussed with the Attending Physician, Dr. Nunez, Bedside Nurse, Patient and Patient's Family.    MIRI Truong CNP  ED Observation  [facility]  Securely message with [employer] (more info)  Text page via [facility] Paging/Directory   ______________________________________________________________________    Interval History   No events overnight. Plan for Lexiscan today. Discharge pending results. Patient denies chest pain or SOB this morning. Denies any symptoms.    Physical Exam   Vital Signs: Temp: 97.8  F (36.6  C) Temp src: Oral BP: 102/75 Pulse: 56   Resp: 16 SpO2: 100 % O2 Device: None (Room air)    Weight: 252 lbs 0 oz    General: awake, alert, NAD  Head: normal cephalic  HEENT: pupils equal, conjugate gaze intact  Neck: Supple  CV: regular rate and rhythm without murmur; peripheral pulses equal bilaterally.  Lungs: clear to auscultation  Abd: soft, non-tender, no guarding, no peritoneal signs  EXT: lower extremities without swelling or edema  Neuro: awake, answers questions appropriately. No focal deficits noted }    Medical Decision Making       30 MINUTES SPENT BY ME on the date of service doing chart review, history, exam, documentation & further activities per the note.      Data "   ------------------------- PAST 24 HR DATA REVIEWED -----------------------------------------------    I have personally reviewed the following data over the past 24 hrs:    Trop: <6 BNP: N/A       Imaging results reviewed over the past 24 hrs:   No results found for this or any previous visit (from the past 24 hour(s)).

## 2023-06-05 NOTE — DISCHARGE SUMMARY
"[facility]  ED Observation Discharge Summary      Date of Admission:  6/4/2023  Date of Discharge:  6/5/2023  Discharging Provider: MIRI Truong CNP  Discharge Service: ED Observation  Discharge Diagnoses   Chest Pain     Clinically Significant Risk Factors     # Obesity: Estimated body mass index is 31.5 kg/m  as calculated from the following:    Height as of this encounter: 1.905 m (6' 3\").    Weight as of this encounter: 114.3 kg (252 lb).       Follow-ups Needed After Discharge   Follow-up Appointments     Follow Up and recommended labs and tests      Follow up with primary care provider, Physician No Ref-Primary, within 7   days for hospital follow- up.  No follow up labs or test are needed.      We recommend you continue to follow up with your outpatient cardiologist   as previously scheduled.             Discharge Disposition   Discharged to home  Condition at discharge: Stable    Hospital Course     Travon Livingston is a 45 year old male admitted on 2/2/2021. He has a medical history significant for CAD s/p PCI (2013 and 2018) and hyperlipidemia who presents to the Emergency Department for evaluation of chest pain and nausea.      ##Chest pain  ##Left arm pain  ##History of CAD s/p PCI with 4 stents in RCA and LAD  47 yr old male with history of CAD s/p PCI with 4 stents in RCA and LAD, who presents for evaluation of chest pain.Yesterday, he was lying on the couch and he had a burning chest pain that started in his mid chest and radiated up to his bilateral jaw.  This morning he states he woke from sleep with pain in his left chest, shoulder and some numbness in the fingers.  Patient reports that he has 4 stents in his heart and the symptoms feel similar to his previous ischemic symptoms.  Pt underwent coronary angiography when he presented with similar symptoms 1/2020 and was found to have minimal in-stent restenosis. NM Lexiscan on 2/3/21 this " showed no notable ischemia.HEART Score: 3. In the ED, VSS, although he was initially a little hypotensive. EKG shows NSR. Troponin negative. Chest Xray normal. Medications: Aspirin 324mg oral once. Plan: Admit to ED Observation for ACS Rule Out. Discussed with cardiology who reviewed patient's NM Lexiscan as well as coronary angiogram in 2020 as well as EKG and Troponin. No events overnight. Patient underwent Lexiscan today which showed no acute findings and was negative for The nuclear stress test is negative for inducible myocardial ischemia or infarction.LVEDv 147ml. LVESv 62ml. LV EF 58%. At the time of discharge, patient vital signs stable, labwork stable, and ACS work up negative. Patient has been asymptomatic for 24 hours. Tolerating PO and ambulating the unit independently. Continue PTA Metoprolol 25 mg BID, rosuvastatin, ASA 81mg at discharge and follow up with cardiologist outpatient as previously planned.      ##HTN: Continue Metoprolol 25 mg BID, rosuvastatin, and ASA     ##HLD: Continue rosuvastatin.    Consultations This Hospital Stay   None    Code Status   Full Code    Time Spent on this Encounter   I, MIRI Truong CNP, personally saw the patient today and spent less than or equal to 30 minutes discharging this patient.       MIRI Truong CNP  Former[facility] [address] O[address]ERV[facility] [address] [address] [address]  Phone: [phone]  Fax: [phone]  ______________________________________________________________________    Physical Exam   Vital Signs: Temp: 97.8  F (36.6  C) Temp src: Oral BP: 102/75 Pulse: 56   Resp: 16 SpO2: 100 % O2 Device: None (Room air)    Weight: 252 lbs 0 oz    General: awake, alert, NAD  Head: normal cephalic  HEENT: pupils equal, conjugate gaze intact  Neck: Supple  CV: regular rate and rhythm without murmur; peripheral pulses equal bilaterally.  Lungs: clear to auscultation  Abd: soft, non-tender, no  guarding, no peritoneal signs  EXT: lower extremities without swelling or edema  Neuro: awake, answers questions appropriately. No focal deficits noted       Primary Care Physician   Physician No Ref-Primary    Discharge Orders      Reason for your hospital stay    Chest Pain     Activity    Your activity upon discharge: activity as tolerated     Discharge Instructions    You were admitted to ED observation for evaluation of chest pain. During admission, your vital signs were stable. EKG shows NSR. Your troponins and labwork were stable. Chest x ray negative. You underwent a lexiscan which showed stable results. NO intervention indicated. We recommend you continue taking your PTA admission medications as previously prescribed including your aspirin, metoprolol, and rosuvastatin. We also recommend you start taking omeprazole daily.     When to contact your care team    Return to the ED with fever, uncontrolled nausea, vomiting, unrelieved pain, bleeding not relieved with pressure, dizziness, chest pain, shortness of breath, loss of consciousness, and any new or concerning symptoms.     Follow Up and recommended labs and tests    Follow up with primary care provider, Physician No Ref-Primary, within 7 days for hospital follow- up.  No follow up labs or test are needed.      We recommend you continue to follow up with your outpatient cardiologist as previously scheduled.     Diet    Follow this diet upon discharge: Regular       Significant Results and Procedures   Most Recent 3 CBC's:Recent Labs   Lab Test 06/04/23  0545 05/26/22  0753 02/02/21 2001   WBC 6.9 5.0 6.9   HGB 16.3 15.6 16.1   MCV 93 93 90    285 320     Most Recent 3 BMP's:Recent Labs   Lab Test 06/04/23  0545 05/26/22  0753 08/23/21  0716    138 135   POTASSIUM 4.2 4.1 4.4   CHLORIDE 104 106 107   CO2 23 25 25   BUN 11.5 13 12   CR 1.04 0.95 0.97   ANIONGAP 12 7 3   TIERA 9.6 8.9 9.3   * 96 90     Most Recent 2 LFT's:Recent Labs   Lab  Test 06/04/23  0545 05/26/22  0753   AST 22 16   ALT 23 27   ALKPHOS 89 80   BILITOTAL 0.4 0.8     Most Recent 3 INR's:Recent Labs   Lab Test 01/25/20  0419   INR 1.00     Most Recent INR's and Anticoagulation Dosing History:  Anticoagulation Dose History         Latest Ref Rng & Units 1/25/2020   Recent Dosing and Labs   INR 0.86 - 1.14 1.00                Most Recent 3 Creatinines:Recent Labs   Lab Test 06/04/23  0545 05/26/22  0753 08/23/21  0716   CR 1.04 0.95 0.97     Most Recent 3 Hemoglobins:Recent Labs   Lab Test 06/04/23  0545 05/26/22  0753 02/02/21 2001   HGB 16.3 15.6 16.1     Most Recent 3 Troponin's:Recent Labs   Lab Test 02/03/21  0359 02/02/21  2333 02/02/21 2001 01/25/20  0957 01/25/20  0419 07/16/18  2133 07/16/18  1215   TROPI <0.015 <0.015 <0.015   < > <0.015   < >  --    TROPONIN  --   --   --   --  0.00  --  0.00    < > = values in this interval not displayed.     Most Recent 3 BNP's:No lab results found.  Most Recent D-dimer:No lab results found.  Most Recent Cholesterol Panel:Recent Labs   Lab Test 05/26/22  0753   CHOL 224*   *   HDL 59   TRIG 133     7-Day Micro Results     No results found for the last 168 hours.        Most Recent TSH and T4:Recent Labs   Lab Test 05/26/22  0753   TSH 0.95     Most Recent Hemoglobin A1c:Recent Labs   Lab Test 01/25/20  0957   A1C 5.1     Most Recent 6 glucoses:Recent Labs   Lab Test 06/04/23  0545 05/26/22  0753 08/23/21  0716 02/02/21 2001 01/25/20 0419 05/09/19  0732   * 96 90 102* 114* 92     Most Recent Urinalysis:Recent Labs   Lab Test 04/27/22  1830   COLOR Yellow   APPEARANCE Clear   URINEGLC Negative   URINEBILI Negative   URINEKETONE Negative   SG <=1.005   UBLD Small*   URINEPH 5.5   PROTEIN Negative   UROBILINOGEN 0.2   NITRITE Negative   LEUKEST Trace*   RBCU 0-2   WBCU 0-5   ,   Results for orders placed or performed during the hospital encounter of 06/04/23   XR Chest 2 Views    Narrative    EXAM: XR CHEST 2  VIEWS  LOCATION: [facility]  DATE/TIME: 6/4/2023 6:34 AM CDT    INDICATION: chest pain  COMPARISON: 02/10/2022      Impression    IMPRESSION: Cardiomediastinal silhouette within normal limits. No focal consolidation or pleural effusion.   NM Lexiscan stress test (nuc card)     Value    Target     Baseline Systolic     Baseline Diastolic BP 70    Last Stress Systolic     Last Stress Diastolic BP 58    Baseline HR 62    Max HR  86    Max Predicted HR  49    Rate Pressure Product 8,944.0    Narrative       The nuclear stress test is negative for inducible myocardial ischemia   or infarction.     LVEDv 147ml. LVESv 62ml. LV EF 58%.     A prior study was conducted on 2/3/2021.           Discharge Medications   Current Discharge Medication List      START taking these medications    Details   omeprazole (PRILOSEC OTC) 20 MG EC tablet Take 1 tablet (20 mg) by mouth daily  Qty: 30 tablet, Refills: 0    Associated Diagnoses: S/P angioplasty with stent; Coronary artery disease without angina pectoris, unspecified vessel or lesion type, unspecified whether native or transplanted heart; Epigastric pain; Gastroesophageal reflux disease without esophagitis; Atypical chest pain; Other chest pain; Family history of ASCVD; Chest pain, unspecified type; CAD S/P percutaneous coronary angioplasty; Hyperlipidemia LDL goal <70; Benign essential hypertension; Unstable angina pectoris (H)         CONTINUE these medications which have CHANGED    Details   !! aspirin (ASA) 81 MG EC tablet Take 1 tablet (81 mg) by mouth daily  Qty: 30 tablet, Refills: 0    Associated Diagnoses: S/P angioplasty with stent      !! aspirin 81 MG EC tablet Take 1 tablet (81 mg) by mouth daily  Qty: 30 tablet, Refills: 0    Associated Diagnoses: Hyperlipidemia LDL goal <70      !! aspirin 81 MG EC tablet Take 1 tablet (81 mg) by mouth daily  Qty: 30 tablet, Refills: 0    Associated Diagnoses:  Hyperlipidemia LDL goal <70      !! metoprolol succinate ER (TOPROL XL) 25 MG 24 hr tablet Take 1 tablet (25 mg) by mouth 2 times daily  Qty: 60 tablet, Refills: 0    Associated Diagnoses: S/P angioplasty with stent; Hyperlipidemia LDL goal <70      !! metoprolol succinate ER (TOPROL XL) 25 MG 24 hr tablet Take 1 tablet (25 mg) by mouth 2 times daily  Qty: 60 tablet, Refills: 0    Associated Diagnoses: S/P angioplasty with stent; Coronary artery disease without angina pectoris, unspecified vessel or lesion type, unspecified whether native or transplanted heart      rosuvastatin (CRESTOR) 40 MG tablet Take 1 tablet (40 mg) by mouth daily  Qty: 30 tablet, Refills: 0    Associated Diagnoses: S/P angioplasty with stent; Coronary artery disease without angina pectoris, unspecified vessel or lesion type, unspecified whether native or transplanted heart       !! - Potential duplicate medications found. Please discuss with provider.        Allergies   No Known Allergies

## 2023-06-05 NOTE — PLAN OF CARE
"Observation Goals:     - Serial troponins and stress test complete: not met, lexiscan stress test scheduled for 1 pm.   - Seen and cleared by consultant if applicable: not met  - Adequate pain control on oral analgesia: met, denies pain  - Vital signs normal or at patient baseline: met, vss  - Safe disposition plan has been identified: not met    /75 (BP Location: Right arm)   Pulse 56   Temp 97.8  F (36.6  C) (Oral)   Resp 16   Ht 1.905 m (6' 3\")   Wt 114.3 kg (252 lb)   SpO2 100%   BMI 31.50 kg/m        "

## 2023-07-08 ENCOUNTER — HEALTH MAINTENANCE LETTER (OUTPATIENT)
Age: 48
End: 2023-07-08

## 2023-08-31 NOTE — PROGRESS NOTES
Chief Complaint:    Chief Complaint   Patient presents with     UTI     Burning with urination, slight discharge, body ache. Onset- Tuesday night        ASSESSMENT     1. Dysuria    2. Discharge from penis without blood           PLAN  Patient is in no acute distress.  He is afebrile with stable vital signs.  No CVA tenderness.  Low suspicion for acute pyelonephritis.  Urinalysis discussed with patient. UA was unremarkable for UTI.   Ordered chlamydia, gonorrhea, and trichomonas testing. Will call with results.  Follow up with PCP in 1 week if symptoms are not improving.  Worrisome symptoms discussed with instructions to go to the ED.  Patient verbalized understanding and agreed with this plan.    Labs:     Results for orders placed or performed in visit on 04/27/22   UA Macro with Reflex to Micro and Culture - lab collect     Status: Abnormal    Specimen: Urine, Midstream   Result Value Ref Range    Color Urine Yellow Colorless, Straw, Light Yellow, Yellow    Appearance Urine Clear Clear    Glucose Urine Negative Negative mg/dL    Bilirubin Urine Negative Negative    Ketones Urine Negative Negative mg/dL    Specific Gravity Urine <=1.005 1.003 - 1.035    Blood Urine Small (A) Negative    pH Urine 5.5 5.0 - 7.0    Protein Albumin Urine Negative Negative mg/dL    Urobilinogen Urine 0.2 0.2, 1.0 E.U./dL    Nitrite Urine Negative Negative    Leukocyte Esterase Urine Trace (A) Negative   Urine Microscopic     Status: Abnormal   Result Value Ref Range    Bacteria Urine None Seen None Seen /HPF    RBC Urine 0-2 0-2 /HPF /HPF    WBC Urine 0-5 0-5 /HPF /HPF    Squamous Epithelials Urine Few (A) None Seen /LPF    Narrative    Urine Culture not indicated       Problem history    Patient Active Problem List   Diagnosis     Atypical chest pain     Hyperlipidemia LDL goal <70     CAD S/P percutaneous coronary angioplasty     Epigastric pain     Chest pain, unspecified type     S/P angioplasty with stent     Family history of  Kevan Queen is a 40 y.o. male with PMH of DM, CHF, cardiomyopathy s/p LVAD and ICD, drug abuse, medication noncompliance, and seizures that was transferred from [facility] for higher level care of acute/subacute L MCA stroke. History obtained from chart review, patient currently intubated and no family present at bedside. He initially presented to OSH ED with aphasia, RSW, and reported episode of seizure like activity. LKN approx. 8 hrs prior at 8pm 8/30. Patient's significant other stated he was able to talk to bed at 1:30am but was not answering questions appropriately, after getting in bed had episode of rhythmic shaking. Of note patient's mother reported he had recently been out of coumadin for 5 days, she was finally able to get it refilled on 8/30 and gave him 2 pills to take that night. On exam at OSH ED, appeared to have seizure-like activity with rhythmic shaking of BUE. CTH at OSH with area of hypodensity in L posterior parietal lobe with subtle bands of hyperdensity suspicious for acute/subacute infarct with petechial hemorrhage. CTA stroke multiphase negative for LVO. Determined not a candidate for acute interventions, no TNK due to petechial hemorrhage on CTH and no LVO for thrombectomy. Stroke code activated at [facility] due to AMS and for expedited transfer to ICU for concerns of airway protection. Stat CTA stable compared to prior study and again showed L parietal hypodensity with questionable mild adjacent petechial hemorrhage without LVO/critical stenosis. Following CTA he was transferred to SICU and intubated for airway protection.   ASCVD       Current Meds    Current Outpatient Medications:      aspirin EC 81 MG EC tablet, Take 1 tablet by mouth daily., Disp: , Rfl:      lisinopril (ZESTRIL) 10 MG tablet, Take 1 tablet (10 mg) by mouth daily, Disp: 90 tablet, Rfl: 3     metoprolol tartrate (LOPRESSOR) 25 MG tablet, Take 0.5 tablets (12.5 mg) by mouth 2 times daily, Disp: 90 tablet, Rfl: 3     rosuvastatin (CRESTOR) 40 MG tablet, Take 1 tablet (40 mg) by mouth daily (Patient not taking: Reported on 4/27/2022), Disp: 90 tablet, Rfl: 3    Allergies  No Known Allergies    SUBJECTIVE    HPI:  Travon Livingston is a 46 year old male who has symptoms of urinary dysuria and discharge from the penis for 1 day(s). He has noticed white pus at the opening of his urethra and increased urinary frequency. Today he developed chills and myalgias. He has not noticed any tenderness or swelling of his testicles. he denies back pain, nausea, vomiting, fever and chills, flank pain.    ROS:      Review of Systems   Constitutional: Positive for chills. Negative for fever.   HENT: Negative.  Negative for sore throat.    Respiratory: Negative.  Negative for cough, chest tightness, shortness of breath and wheezing.    Cardiovascular: Negative.  Negative for chest pain and palpitations.   Gastrointestinal: Negative.  Negative for abdominal pain, diarrhea, nausea and vomiting.   Genitourinary: Positive for dysuria, frequency and penile discharge. Negative for hematuria, penile pain, penile swelling, scrotal swelling, testicular pain and urgency.   Musculoskeletal: Positive for myalgias.   Skin: Negative for rash.   Allergic/Immunologic: Negative.  Negative for immunocompromised state.   Neurological: Negative.  Negative for headaches.       Family History   Family History   Problem Relation Age of Onset     C.A.D. Father      Myocardial Infarction Father         45     Myocardial Infarction Paternal Uncle         45     Myocardial Infarction Paternal Uncle         Social  History  Social History     Socioeconomic History     Marital status:      Spouse name: Not on file     Number of children: Not on file     Years of education: Not on file     Highest education level: Not on file   Occupational History     Not on file   Tobacco Use     Smoking status: Former Smoker     Packs/day: 1.00     Years: 15.00     Pack years: 15.00     Types: Cigarettes     Quit date: 8/23/2011     Years since quitting: 10.6     Smokeless tobacco: Never Used   Substance and Sexual Activity     Alcohol use: Yes     Comment: every couple days 1-2 beers/whiskey     Drug use: No     Sexual activity: Yes     Partners: Female   Other Topics Concern     Parent/sibling w/ CABG, MI or angioplasty before 65F 55M? Yes   Social History Narrative     Not on file     Social Determinants of Health     Financial Resource Strain: Not on file   Food Insecurity: Not on file   Transportation Needs: Not on file   Physical Activity: Not on file   Stress: Not on file   Social Connections: Not on file   Intimate Partner Violence: Not on file   Housing Stability: Not on file           OBJECTIVE     Vital signs noted and reviewed by Harris Domingo PA-C  /85 (BP Location: Left arm, Patient Position: Sitting, Cuff Size: Adult Large)   Pulse 90   Temp 97.2  F (36.2  C) (Tympanic)   Wt 117.8 kg (259 lb 9.6 oz)   SpO2 98%   BMI 32.28 kg/m       Physical Exam  Vitals and nursing note reviewed.   Constitutional:       General: He is not in acute distress.     Appearance: He is well-developed. He is not ill-appearing, toxic-appearing or diaphoretic.   HENT:      Head: Normocephalic and atraumatic.      Right Ear: Hearing normal.      Left Ear: Hearing normal.      Nose: Nose normal. No mucosal edema, congestion or rhinorrhea.      Mouth/Throat:      Pharynx: No oropharyngeal exudate or posterior oropharyngeal erythema.      Tonsils: No tonsillar exudate or tonsillar abscesses. 0 on the right. 0 on the left.   Eyes:       Pupils: Pupils are equal, round, and reactive to light.   Cardiovascular:      Rate and Rhythm: Normal rate and regular rhythm.      Heart sounds: Normal heart sounds, S1 normal and S2 normal. Heart sounds not distant. No murmur heard.    No friction rub. No gallop.   Pulmonary:      Effort: Pulmonary effort is normal. No respiratory distress.      Breath sounds: Normal breath sounds. No decreased breath sounds, wheezing, rhonchi or rales.   Abdominal:      General: Bowel sounds are normal. There is no distension.      Palpations: Abdomen is soft.      Tenderness: There is no abdominal tenderness.   Genitourinary:     Penis: Normal. No erythema, tenderness, discharge, swelling or lesions.       Testes: Normal.         Right: Tenderness or swelling not present.         Left: Tenderness or swelling not present.   Musculoskeletal:      Cervical back: Normal range of motion and neck supple.   Lymphadenopathy:      Cervical: No cervical adenopathy.   Skin:     General: Skin is warm and dry.      Findings: No rash.   Neurological:      Mental Status: He is alert.      Cranial Nerves: No cranial nerve deficit.   Psychiatric:         Attention and Perception: He is attentive.         Speech: Speech normal.         Behavior: Behavior normal. Behavior is cooperative.         Thought Content: Thought content normal.         Judgment: Judgment normal.             Harris Domingo PA-C  4/27/2022, 6:23 PM

## 2024-03-28 ENCOUNTER — OFFICE VISIT (OUTPATIENT)
Dept: FAMILY MEDICINE | Facility: CLINIC | Age: 49
End: 2024-03-28
Payer: COMMERCIAL

## 2024-03-28 VITALS
HEART RATE: 77 BPM | DIASTOLIC BLOOD PRESSURE: 88 MMHG | RESPIRATION RATE: 16 BRPM | TEMPERATURE: 97.6 F | WEIGHT: 261 LBS | OXYGEN SATURATION: 98 % | BODY MASS INDEX: 32.45 KG/M2 | SYSTOLIC BLOOD PRESSURE: 128 MMHG | HEIGHT: 75 IN

## 2024-03-28 DIAGNOSIS — Z00.00 ROUTINE GENERAL MEDICAL EXAMINATION AT A HEALTH CARE FACILITY: ICD-10-CM

## 2024-03-28 DIAGNOSIS — Z95.820 S/P ANGIOPLASTY WITH STENT: ICD-10-CM

## 2024-03-28 DIAGNOSIS — R39.12 WEAK URINARY STREAM: ICD-10-CM

## 2024-03-28 DIAGNOSIS — Z98.61 CAD S/P PERCUTANEOUS CORONARY ANGIOPLASTY: ICD-10-CM

## 2024-03-28 DIAGNOSIS — I25.10 CAD S/P PERCUTANEOUS CORONARY ANGIOPLASTY: ICD-10-CM

## 2024-03-28 DIAGNOSIS — R53.83 FATIGUE, UNSPECIFIED TYPE: ICD-10-CM

## 2024-03-28 DIAGNOSIS — I25.10 CORONARY ARTERY DISEASE WITHOUT ANGINA PECTORIS, UNSPECIFIED VESSEL OR LESION TYPE, UNSPECIFIED WHETHER NATIVE OR TRANSPLANTED HEART: ICD-10-CM

## 2024-03-28 DIAGNOSIS — Z12.11 SCREEN FOR COLON CANCER: Primary | ICD-10-CM

## 2024-03-28 PROCEDURE — 84153 ASSAY OF PSA TOTAL: CPT | Performed by: FAMILY MEDICINE

## 2024-03-28 PROCEDURE — 84403 ASSAY OF TOTAL TESTOSTERONE: CPT | Performed by: FAMILY MEDICINE

## 2024-03-28 PROCEDURE — 84270 ASSAY OF SEX HORMONE GLOBUL: CPT | Performed by: FAMILY MEDICINE

## 2024-03-28 PROCEDURE — 80061 LIPID PANEL: CPT | Performed by: FAMILY MEDICINE

## 2024-03-28 PROCEDURE — 84443 ASSAY THYROID STIM HORMONE: CPT | Performed by: FAMILY MEDICINE

## 2024-03-28 PROCEDURE — 99214 OFFICE O/P EST MOD 30 MIN: CPT | Mod: 25 | Performed by: FAMILY MEDICINE

## 2024-03-28 PROCEDURE — 99396 PREV VISIT EST AGE 40-64: CPT | Performed by: FAMILY MEDICINE

## 2024-03-28 PROCEDURE — 36415 COLL VENOUS BLD VENIPUNCTURE: CPT | Performed by: FAMILY MEDICINE

## 2024-03-28 RX ORDER — ROSUVASTATIN CALCIUM 40 MG/1
40 TABLET, COATED ORAL DAILY
Qty: 90 TABLET | Refills: 3 | Status: SHIPPED | OUTPATIENT
Start: 2024-03-28

## 2024-03-28 RX ORDER — METOPROLOL SUCCINATE 50 MG/1
50 TABLET, EXTENDED RELEASE ORAL DAILY
Qty: 90 TABLET | Refills: 3 | Status: SHIPPED | OUTPATIENT
Start: 2024-03-28

## 2024-03-28 SDOH — HEALTH STABILITY: PHYSICAL HEALTH: ON AVERAGE, HOW MANY DAYS PER WEEK DO YOU ENGAGE IN MODERATE TO STRENUOUS EXERCISE (LIKE A BRISK WALK)?: 3 DAYS

## 2024-03-28 SDOH — HEALTH STABILITY: PHYSICAL HEALTH: ON AVERAGE, HOW MANY MINUTES DO YOU ENGAGE IN EXERCISE AT THIS LEVEL?: 50 MIN

## 2024-03-28 ASSESSMENT — SOCIAL DETERMINANTS OF HEALTH (SDOH): HOW OFTEN DO YOU GET TOGETHER WITH FRIENDS OR RELATIVES?: ONCE A WEEK

## 2024-03-28 NOTE — PATIENT INSTRUCTIONS
Preventive Care Advice   This is general advice given by our system to help you stay healthy. However, your care team may have specific advice just for you. Please talk to your care team about your preventive care needs.  Nutrition  Eat 5 or more servings of fruits and vegetables each day.  Try wheat bread, brown rice and whole grain pasta (instead of white bread, rice, and pasta).  Get enough calcium and vitamin D. Check the label on foods and aim for 100% of the RDA (recommended daily allowance).  Lifestyle  Exercise at least 150 minutes each week   (30 minutes a day, 5 days a week).  Do muscle strengthening activities 2 days a week. These help control your weight and prevent disease.  No smoking.  Wear sunscreen to prevent skin cancer.  Have a dental exam and cleaning every 6 months.  Yearly exams  See your health care team every year to talk about:  Any changes in your health.  Any medicines your care team has prescribed.  Preventive care, family planning, and ways to prevent chronic diseases.  Shots (vaccines)   HPV shots (up to age 26), if you've never had them before.  Hepatitis B shots (up to age 59), if you've never had them before.  COVID-19 shot: Get this shot when it's due.  Flu shot: Get a flu shot every year.  Tetanus shot: Get a tetanus shot every 10 years.  Pneumococcal, hepatitis A, and RSV shots: Ask your care team if you need these based on your risk.  Shingles shot (for age 50 and up).  General health tests  Diabetes screening:  Starting at age 35, Get screened for diabetes at least every 3 years.  If you are younger than age 35, ask your care team if you should be screened for diabetes.  Cholesterol test: At age 39, start having a cholesterol test every 5 years, or more often if advised.  Bone density scan (DEXA): At age 50, ask your care team if you should have this scan for osteoporosis (brittle bones).  Hepatitis C: Get tested at least once in your life.  STIs (sexually transmitted  infections)  Before age 24: Ask your care team if you should be screened for STIs.  After age 24: Get screened for STIs if you're at risk. You are at risk for STIs (including HIV) if:  You are sexually active with more than one person.  You don't use condoms every time.  You or a partner was diagnosed with a sexually transmitted infection.  If you are at risk for HIV, ask about PrEP medicine to prevent HIV.  Get tested for HIV at least once in your life, whether you are at risk for HIV or not.  Cancer screening tests  Cervical cancer screening: If you have a cervix, begin getting regular cervical cancer screening tests at age 21. Most people who have regular screenings with normal results can stop after age 65. Talk about this with your provider.  Breast cancer scan (mammogram): If you've ever had breasts, begin having regular mammograms starting at age 40. This is a scan to check for breast cancer.  Colon cancer screening: It is important to start screening for colon cancer at age 45.  Have a colonoscopy test every 10 years (or more often if you're at risk) Or, ask your provider about stool tests like a FIT test every year or Cologuard test every 3 years.  To learn more about your testing options, visit: [email or web address].  For help making a decision, visit: https://bit.ly/ez39183.  Prostate cancer screening test: If you have a prostate and are age 55 to 69, ask your provider if you would benefit from a yearly prostate cancer screening test.  Lung cancer screening: If you are a current or former smoker age 50 to 80, ask your care team if ongoing lung cancer screenings are right for you.  For informational purposes only. Not to replace the advice of your health care provider. Copyright   2023 [employer]. All rights reserved. Clinically reviewed by the [facility]itions Program. [employer] [id number] - REV 01/24.    Learning About Stress  What is stress?     Stress is your  body's response to a hard situation. Your body can have a physical, emotional, or mental response. Stress is a fact of life for most people, and it affects everyone differently. What causes stress for you may not be stressful for someone else.  A lot of things can cause stress. You may feel stress when you go on a job interview, take a test, or run a race. This kind of short-term stress is normal and even useful. It can help you if you need to work hard or react quickly. For example, stress can help you finish an important job on time.  Long-term stress is caused by ongoing stressful situations or events. Examples of long-term stress include long-term health problems, ongoing problems at work, or conflicts in your family. Long-term stress can harm your health.  How does stress affect your health?  When you are stressed, your body responds as though you are in danger. It makes hormones that speed up your heart, make you breathe faster, and give you a burst of energy. This is called the fight-or-flight stress response. If the stress is over quickly, your body goes back to normal and no harm is done.  But if stress happens too often or lasts too long, it can have bad effects. Long-term stress can make you more likely to get sick, and it can make symptoms of some diseases worse. If you tense up when you are stressed, you may develop neck, shoulder, or low back pain. Stress is linked to high blood pressure and heart disease.  Stress also harms your emotional health. It can make you machuca, tense, or depressed. Your relationships may suffer, and you may not do well at work or school.  What can you do to manage stress?  You can try these things to help manage stress:   Do something active. Exercise or activity can help reduce stress. Walking is a great way to get started. Even everyday activities such as housecleaning or yard work can help.  Try yoga or jeremiah chi. These techniques combine exercise and meditation. You may need  some training at first to learn them.  Do something you enjoy. For example, listen to music or go to a movie. Practice your hobby or do volunteer work.  Meditate. This can help you relax, because you are not worrying about what happened before or what may happen in the future.  Do guided imagery. Imagine yourself in any setting that helps you feel calm. You can use online videos, books, or a teacher to guide you.  Do breathing exercises. For example:  From a standing position, bend forward from the waist with your knees slightly bent. Let your arms dangle close to the floor.  Breathe in slowly and deeply as you return to a standing position. Roll up slowly and lift your head last.  Hold your breath for just a few seconds in the standing position.  Breathe out slowly and bend forward from the waist.  Let your feelings out. Talk, laugh, cry, and express anger when you need to. Talking with supportive friends or family, a counselor, or a juan leader about your feelings is a healthy way to relieve stress. Avoid discussing your feelings with people who make you feel worse.  Write. It may help to write about things that are bothering you. This helps you find out how much stress you feel and what is causing it. When you know this, you can find better ways to cope.  What can you do to prevent stress?  You might try some of these things to help prevent stress:  Manage your time. This helps you find time to do the things you want and need to do.  Get enough sleep. Your body recovers from the stresses of the day while you are sleeping.  Get support. Your family, friends, and community can make a difference in how you experience stress.  Limit your news feed. Avoid or limit time on social media or news that may make you feel stressed.  Do something active. Exercise or activity can help reduce stress. Walking is a great way to get started.  Where can you learn more?  Go to https://www.healthwise.net/patiented  Enter N032 in the  "search box to learn more about \"Learning About Stress.\"  Current as of: October 24, 2023               Content Version: 14.0    2531-5312 [employer].   Care instructions adapted under license by your healthcare professional. If you have questions about a medical condition or this instruction, always ask your healthcare professional. [employer] disclaims any warranty or liability for your use of this information.      Learning About Alcohol Use Disorder  What is alcohol use disorder?  Alcohol use disorder means that a person drinks alcohol even though it causes harm to themselves or others. It can range from mild to severe. The more symptoms of this disorder you have, the more severe it may be. People who have it may find it hard to control their use of alcohol.  People who have this disorder may argue with others about how much they're drinking. Their job may be affected because of drinking. They may drink when it's dangerous or illegal, such as when they drive. They also may have a strong need, or craving, to drink. They may feel like they must drink just to get by. Their drinking may increase their risk of getting hurt or being in a car crash.  Over time, drinking too much alcohol may cause health problems. These may include high blood pressure, liver problems, or problems with digestion.  What are the symptoms?  Maybe you've wondered about your alcohol habits or how to tell if your drinking is becoming a problem.  Here are some of the symptoms of alcohol use disorder. You may have it if you have two or more of the following symptoms:  You drink larger amounts of alcohol than you ever meant to. Or you've been drinking for a longer time than you ever meant to.  You can't cut down or control your use. Or you constantly wish you could cut down.  You spend a lot of time getting or drinking alcohol or recovering from its effects.  You have strong cravings for alcohol.  You can no longer do " your main jobs at work, at school, or at home.  You keep drinking alcohol, even though your use hurts your relationships.  You have stopped doing important activities because of your alcohol use.  You drink alcohol in situations where doing so is dangerous.  You keep drinking alcohol even though you know it's causing health problems.  You need more and more alcohol to get the same effect, or you get less effect from the same amount over time. This is called tolerance.  You have uncomfortable symptoms when you stop drinking alcohol or use less. This is called withdrawal.  Alcohol use disorder can range from mild to severe. The more symptoms you have, the more severe the disorder may be.  You might not realize that your drinking is a problem. You might not drink large amounts when you drink. Or you might go for days or weeks between drinking episodes. But even if you don't drink very often, your drinking could still be harmful and put you at risk.  How is alcohol use disorder treated?  Getting help is up to you. But you don't have to do it alone. There are many people and kinds of treatments that can help.  Treatment for alcohol use disorder can include:  Group therapy, one or more types of counseling, and alcohol education.  Medicines that help to:  Reduce withdrawal symptoms and help you safely stop drinking.  Reduce cravings for alcohol.  Support groups. These groups include Alcoholics Anonymous and SiOx (Self-Management and Recovery Training).  Some people are able to stop or cut back on drinking with help from a counselor. People who have moderate to severe alcohol use disorder may need medical treatment. They may need to stay in a hospital or treatment center.  You may have a treatment team to help you. This team may include a psychologist or psychiatrist, counselors, doctors, social workers, nurses, and a . A  helps plan and manage your treatment.  Follow-up care is a key part  "of your treatment and safety. Be sure to make and go to all appointments, and call your doctor if you are having problems. It's also a good idea to know your test results and keep a list of the medicines you take.  Where can you learn more?  Go to https://www.i-marker.net/patiented  Enter H758 in the search box to learn more about \"Learning About Alcohol Use Disorder.\"  Current as of: November 15, 2023               Content Version: 14.0    2332-9382 [email or web address].   Care instructions adapted under license by your healthcare professional. If you have questions about a medical condition or this instruction, always ask your healthcare professional. [email or web address] disclaims any warranty or liability for your use of this information.      Substance Use Disorder: Care Instructions  Overview     You can improve your life and health by stopping your use of alcohol or drugs. When you don't drink or use drugs, you may feel and sleep better. You may get along better with your family, friends, and coworkers. There are medicines and programs that can help with substance use disorder.  How can you care for yourself at home?  Here are some ways to help you stay sober and prevent relapse.  If you have been given medicine to help keep you sober or reduce your cravings, be sure to take it exactly as prescribed.  Talk to your doctor about programs that can help you stop using drugs or drinking alcohol.  Do not keep alcohol or drugs in your home.  Plan ahead. Think about what you'll say if other people ask you to drink or use drugs. Try not to spend time with people who drink or use drugs.  Use the time and money spent on drinking or drugs to do something that's important to you.  Preventing a relapse  Have a plan to deal with relapse. Learn to recognize changes in your thinking that lead you to drink or use drugs. Get help before you start to drink or use drugs again.  Try to stay away from situations, friends, " or places that may lead you to drink or use drugs.  If you feel the need to drink alcohol or use drugs again, seek help right away. Call a trusted friend or family member. Some people get support from organizations such as Narcotics Anonymous or [employer] or from treatment facilities.  If you relapse, get help as soon as you can. Some people make a plan with another person that outlines what they want that person to do for them if they relapse. The plan usually includes how to handle the relapse and who to notify in case of relapse.  Don't give up. Remember that a relapse doesn't mean that you have failed. Use the experience to learn the triggers that lead you to drink or use drugs. Then quit again. Recovery is a lifelong process. Many people have several relapses before they are able to quit for good.  Follow-up care is a key part of your treatment and safety. Be sure to make and go to all appointments, and call your doctor if you are having problems. It's also a good idea to know your test results and keep a list of the medicines you take.  When should you call for help?   Call 171  anytime you think you may need emergency care. For example, call if you or someone else:    Has overdosed or has withdrawal signs. Be sure to tell the emergency workers that you are or someone else is using or trying to quit using drugs. Overdose or withdrawal signs may include:  Losing consciousness.  Seizure.  Seeing or hearing things that aren't there (hallucinations).     Is thinking or talking about suicide or harming others.   Where to get help 24 hours a day, 7 days a week   If you or someone you know talks about suicide, self-harm, a mental health crisis, a substance use crisis, or any other kind of emotional distress, get help right away. You can:    Call the Suicide and Crisis Lifeline at 449.     Call 3-872-852-TALK (1-819.558.4990).     Text HOME to 665106 to access the Crisis Text Line.   Consider saving these numbers  "in your phone.  Go to [employer] for more information or to chat online.  Call your doctor now or seek immediate medical care if:    You are having withdrawal symptoms. These may include nausea or vomiting, sweating, shakiness, and anxiety.   Watch closely for changes in your health, and be sure to contact your doctor if:    You have a relapse.     You need more help or support to stop.   Where can you learn more?  Go to https://www.[employer].net/patiented  Enter H573 in the search box to learn more about \"Substance Use Disorder: Care Instructions.\"  Current as of: November 15, 2023               Content Version: 14.0    5967-1986 [employer].   Care instructions adapted under license by your healthcare professional. If you have questions about a medical condition or this instruction, always ask your healthcare professional. [employer] disclaims any warranty or liability for your use of this information.      Safer Sex: Care Instructions  Overview  Safer sex is a way to reduce your risk of getting a sexually transmitted infection (STI). It can also help prevent pregnancy.  Several products can help you practice safer sex and reduce your chance of STIs. One of the best is a condom. There are internal and external condoms. You can use a special rubber sheet (dental dam) for protection during oral sex. Disposable gloves can keep your hands from touching blood, semen, or other body fluids that can carry infections.  Remember that birth control methods such as diaphragms, IUDs, foams, and birth control pills do not stop you from getting STIs.  Follow-up care is a key part of your treatment and safety. Be sure to make and go to all appointments, and call your doctor if you are having problems. It's also a good idea to know your test results and keep a list of the medicines you take.  How can you care for yourself at home?  Think about getting vaccinated to help prevent hepatitis A, " "hepatitis B, and human papillomavirus (HPV). They can be spread through sex.  Use a condom every time you have sex. Use an external condom, which goes on the penis. Or use an internal condom, which goes into the vagina or anus.  Make sure you use the right size external condom. A condom that's too small can break easily. A condom that's too big can slip off during sex.  Use a new condom each time you have sex. Be careful not to poke a hole in the condom when you open the wrapper.  Don't use an internal condom and an external condom at the same time.  Never use petroleum jelly (such as Vaseline), grease, hand lotion, baby oil, or anything with oil in it. These products can make holes in the condom.  After intercourse, hold the edge of the condom as you remove it. This will help keep semen from spilling out of the condom.  Do not have sex with anyone who has symptoms of an STI, such as sores on the genitals or mouth.  Do not drink a lot of alcohol or use drugs before sex.  Limit your sex partners. Sex with one partner who has sex only with you can reduce your risk of getting an STI.  Don't share sex toys. But if you do share them, use a condom and clean the sex toys between each use.  Talk to any partners before you have sex. Talk about what you feel comfortable with and whether you have any boundaries with sex. And find out if your partner or partners may be at risk for any STI. Keep in mind that a person may be able to spread an STI even if they do not have symptoms. You and any partners may want to get tested for STIs.  Where can you learn more?  Go to https://www.[employer].net/patiented  Enter B608 in the search box to learn more about \"Safer Sex: Care Instructions.\"  Current as of: November 27, 2023               Content Version: 14.0    1923-7823 [employer].   Care instructions adapted under license by your healthcare professional. If you have questions about a medical condition or this " instruction, always ask your healthcare professional. [employer] disclaims any warranty or liability for your use of this information.

## 2024-03-28 NOTE — PROGRESS NOTES
"Preventive Care Visit  [facility]  ELISEO PALOMO DO, Family Medicine  Mar 28, 2024      Assessment & Plan   Problem List Items Addressed This Visit       CAD S/P percutaneous coronary angioplasty    Relevant Medications    metoprolol succinate ER (TOPROL XL) 50 MG 24 hr tablet    rosuvastatin (CRESTOR) 40 MG tablet    Other Relevant Orders    Lipid panel reflex to direct LDL Fasting    S/P angioplasty with stent    Relevant Medications    rosuvastatin (CRESTOR) 40 MG tablet     Other Visit Diagnoses       Screen for colon cancer    -  Primary    Relevant Orders    Colonoscopy Screening  Referral    Fatigue, unspecified type        Relevant Orders    Testosterone Free and Total    TSH with free T4 reflex    Adult Sleep Eval & Management  Referral    Weak urinary stream        Relevant Orders    PSA, screen    Routine general medical examination at a health care facility        Coronary artery disease without angina pectoris, unspecified vessel or lesion type, unspecified whether native or transplanted heart        Relevant Medications    rosuvastatin (CRESTOR) 40 MG tablet           Today we changed his metoprolol from 25mg ER BID to 50mg ER daily  We ordered testosterone at his request, for fatigue, and he also agreed to a sleep study and TSH  Continue Crestor  PSA for intermittent weak stream        Patient has been advised of split billing requirements and indicates understanding: Yes         BMI  Estimated body mass index is 32.62 kg/m  as calculated from the following:    Height as of this encounter: 1.905 m (6' 3\").    Weight as of this encounter: 118.4 kg (261 lb).       Counseling  Appropriate preventive services were discussed with this patient, including applicable screening as appropriate for fall prevention, nutrition, physical activity, Tobacco-use cessation, weight loss and cognition.  Checklist reviewing preventive services available has been given to the " patient.  Reviewed patient's diet, addressing concerns and/or questions.   He is at risk for lack of exercise and has been provided with information to increase physical activity for the benefit of his well-being.   The patient was instructed to see the dentist every 6 months.   He is at risk for psychosocial distress and has been provided with information to reduce risk.   The patient reports drinking more than one alcoholic drink per day and sometimes engages in binge or excessive drinking. The patient was counseled and given information about possible harmful effects of excessive alcohol intake as well as where to get help for alcohol problems.         Subjective   Travon Livingston is a 48 year old, presenting for the following:  Physical        3/28/2024     2:00 PM   Additional Questions   Roomed by Giana JAMISON CMA        Health Care Directive  Patient does not have a Health Care Directive or Living Will: Discussed advance care planning with patient; information given to patient to review.    HPI  Patient presents for annual exam. Reports having trouble emptying bladder. Having fatigue and would like to test testosterone.     Quit smoking over 10 years ago.           3/28/2024   General Health   How would you rate your overall physical health? Good   Feel stress (tense, anxious, or unable to sleep) Only a little   (!) STRESS CONCERN      3/28/2024   Nutrition   Three or more servings of calcium each day? Yes   Diet: Regular (no restrictions)   How many servings of fruit and vegetables per day? (!) 0-1   How many sweetened beverages each day? 0-1         3/28/2024   Exercise   Days per week of moderate/strenous exercise 3 days   Average minutes spent exercising at this level 50 min         3/28/2024   Social Factors   Frequency of gathering with friends or relatives Once a week   Worry food won't last until get money to buy more No   Food not last or not have enough money for food? No   Do you have housing?  Yes    Are you worried about losing your housing? No   Lack of transportation? No   Unable to get utilities (heat,electricity)? No         3/28/2024   Dental   Dentist two times every year? (!) NO         3/28/2024   TB Screening   Were you born outside of the [place]? No         Today's PHQ-2 Score:       3/28/2024    11:25 AM   PHQ-2 (  [employer])   Q1: Little interest or pleasure in doing things 0   Q2: Feeling down, depressed or hopeless 0   PHQ-2 Score 0   Q1: Little interest or pleasure in doing things Not at all   Q2: Feeling down, depressed or hopeless Not at all   PHQ-2 Score 0           3/28/2024   Substance Use   Alcohol more than 3/day or more than 7/wk Yes   How often do you have a drink containing alcohol 2 to 3 times a week   How many alcohol drinks on typical day 1 or 2   How often do you have 5+ drinks at one occasion Monthly   Audit 2/3 Score 2   How often not able to stop drinking once started Never   How often failed to do what normally expected Never   How often needed first drink in am after a heavy drinking session Never   How often feeling of guilt or remorse after drinking Less than monthly   How often unable to remember what happened the night before Never   Have you or someone else been injured because of your drinking No   Has anyone been concerned or suggested you cut down on drinking No   TOTAL SCORE - AUDIT 6   Do you use any other substances recreationally? (!) CANNABIS PRODUCTS     Social History     Tobacco Use    Smoking status: Former     Packs/day: 1.00     Years: 15.00     Additional pack years: 0.00     Total pack years: 15.00     Types: Cigarettes     Quit date: 2011     Years since quittin.6     Passive exposure: Past    Smokeless tobacco: Never   Vaping Use    Vaping Use: Never used   Substance Use Topics    Alcohol use: Yes     Comment: every couple days 1-2 beers/whiskey    Drug use: No           3/28/2024   STI Screening   New sexual partner(s) since last STI/HIV test?  "(!) YES    ASCVD Risk   The 10-year ASCVD risk score (Candice GREY, et al., 2019) is: 2.6%    Values used to calculate the score:      Age: 48 years      Sex: Male      Is Non- : No      Diabetic: No      Tobacco smoker: No      Systolic Blood Pressure: 128 mmHg      Is BP treated: Yes      HDL Cholesterol: 59 mg/dL      Total Cholesterol: 224 mg/dL        3/28/2024   Contraception/Family Planning   Questions about contraception or family planning No        Reviewed and updated as needed this visit by Provider                           Objective    Exam  /88 (BP Location: Right arm, Patient Position: Chair, Cuff Size: Adult Large)   Pulse 77   Temp 97.6  F (36.4  C) (Temporal)   Resp 16   Ht 1.905 m (6' 3\")   Wt 118.4 kg (261 lb)   SpO2 98%   BMI 32.62 kg/m     Estimated body mass index is 32.62 kg/m  as calculated from the following:    Height as of this encounter: 1.905 m (6' 3\").    Weight as of this encounter: 118.4 kg (261 lb).    Physical Exam  GENERAL: alert and no distress  NECK: no adenopathy, no asymmetry, masses, or scars  RESP: lungs clear to auscultation - no rales, rhonchi or wheezes  CV: regular rate and rhythm, normal S1 S2, no S3 or S4, no murmur, click or rub, no peripheral edema  ABDOMEN: soft, nontender, no hepatosplenomegaly, no masses and bowel sounds normal  MS: no gross musculoskeletal defects noted, no edema        Signed Electronically by: ELISEO PALOMO DO    "

## 2024-03-29 LAB
CHOLEST SERPL-MCNC: 257 MG/DL
FASTING STATUS PATIENT QL REPORTED: YES
HDLC SERPL-MCNC: 53 MG/DL
LDLC SERPL CALC-MCNC: 176 MG/DL
NONHDLC SERPL-MCNC: 204 MG/DL
PSA SERPL DL<=0.01 NG/ML-MCNC: 1.76 NG/ML (ref 0–2.5)
SHBG SERPL-SCNC: 42 NMOL/L (ref 11–80)
TRIGL SERPL-MCNC: 138 MG/DL
TSH SERPL DL<=0.005 MIU/L-ACNC: 1.12 UIU/ML (ref 0.3–4.2)

## 2024-03-31 LAB
TESTOST FREE SERPL-MCNC: 9.76 NG/DL
TESTOST SERPL-MCNC: 535 NG/DL (ref 240–950)

## 2024-04-05 ENCOUNTER — TELEPHONE (OUTPATIENT)
Dept: GASTROENTEROLOGY | Facility: CLINIC | Age: 49
End: 2024-04-05
Payer: COMMERCIAL

## 2024-04-05 NOTE — TELEPHONE ENCOUNTER
"Endoscopy Scheduling Screen    Have you had a positive Covid test in the last 14 days?  No    What is your communication preference for Instructions and/or Bowel Prep?   MyChart    What insurance is in the chart?  Other:  [employer]    Ordering/Referring Provider:     ELISEO PALOMO      (If ordering provider performs procedure, schedule with ordering provider unless otherwise instructed. )    BMI: Estimated body mass index is 32.62 kg/m  as calculated from the following:    Height as of 3/28/24: 1.905 m (6' 3\").    Weight as of 3/28/24: 118.4 kg (261 lb).     Sedation Ordered  moderate sedation.   If patient BMI > 50 do not schedule in [facility].    If patient BMI > 45 do not schedule at [facility].    Are you taking methadone or Suboxone?  No    Have you had difficulties, pain, or discomfort during past endoscopy procedures?  No    Are you taking any prescription medications for pain 3 or more times per week?   NO, No RN review required.    Do you have a history of malignant hyperthermia?  No    (Females) Are you currently pregnant?        Have you been diagnosed or told you have pulmonary hypertension?   No    Do you have an LVAD?  No    Have you been told you have moderate to severe sleep apnea?  No    Have you been told you have COPD, asthma, or any other lung disease?  No    Do you have any heart conditions?  Yes     In the past year, have you had any hospitalizations for heart related issues including cardiomyopathy, heart attack, or stent placement?  No    Do you have any implantable devices in your body (pacemaker, ICD)?  No    Do you take nitroglycerine?  No    Have you ever had or are you waiting for an organ transplant?  No. Continue scheduling, no site restrictions.    Have you had a stroke or transient ischemic attack (TIA aka \"mini stroke\" in the last 6 months?   No    Have you been diagnosed with or been told you have cirrhosis of the liver?   No    Are you currently on dialysis?   No    Do you need assistance " "transferring?   No    BMI: Estimated body mass index is 32.62 kg/m  as calculated from the following:    Height as of 3/28/24: 1.905 m (6' 3\").    Weight as of 3/28/24: 118.4 kg (261 lb).     Is patients BMI > 40 and scheduling location [facility]?  No    Do you take an injectable medication for weight loss or diabetes (excluding insulin)?  No    Do you take the medication Naltrexone?  No    Do you take blood thinners?  No       Prep   Are you currently on dialysis or do you have chronic kidney disease?  No    Do you have a diagnosis of diabetes?  No    Do you have a diagnosis of cystic fibrosis (CF)?  No    On a regular basis do you go 3 -5 days between bowel movements?  No    BMI > 40?  No    Preferred [facility]acy:    [facility]    Final Scheduling Details     Procedure scheduled  Colonoscopy    Surgeon:  KAMINI     Date of procedure:  6/4     Pre-OP / PAC:   No - Not required for this site.    Location  [facility] - Per order.    Sedation   Moderate Sedation - Per order.      Patient Reminders:   You will receive a call from a Nurse to review instructions and health history.  This assessment must be completed prior to your procedure.  Failure to complete the Nurse assessment may result in the procedure being cancelled.      On the day of your procedure, please designate an adult(s) who can drive you home stay with you for the next 24 hours. The medicines used in the exam will make you sleepy. You will not be able to drive.      You cannot take public transportation, ride share services, or non-medical taxi service without a responsible caregiver.  Medical transport services are allowed with the requirement that a responsible caregiver will receive you at your destination.  We require that drivers and caregivers are confirmed prior to your procedure.  "

## 2024-05-28 ENCOUNTER — TELEPHONE (OUTPATIENT)
Dept: GASTROENTEROLOGY | Facility: CLINIC | Age: 49
End: 2024-05-28
Payer: COMMERCIAL

## 2024-05-28 NOTE — TELEPHONE ENCOUNTER
Pre assessment completed for upcoming procedure.   (Please see previous telephone encounter notes for complete details)    Procedure details:    Arrival time and facility location reviewed.    Pre op exam needed? N/A    Designated  policy reviewed. Instructed to have someone stay 6 hours post procedure.     COVID policy reviewed.      Medication review:    Medications reviewed. Please see supporting documentation below. Holding recommendations discussed (if applicable).       Prep for procedure:     Procedure prep instructions reviewed.        Additional information needed?  N/A      Patient  verbalized understanding and had no questions or concerns at this time.      Corinne Kliber, RN  Endoscopy Procedure Pre Assessment RN  [phone] option 4

## 2024-05-28 NOTE — TELEPHONE ENCOUNTER
Attempted to contact patient in order to complete pre assessment questions.     No answer. Left message to return call to [phone] option 4    Callback communication sent via [employer].    Corinne Kliber, RN  Endoscopy Procedure Pre Assessment RN  [phone] option 4

## 2024-05-28 NOTE — TELEPHONE ENCOUNTER
Patient had cardiac stents placed 6/4/23 - ok to proceed as 1 year from procedure date.    --------------------------------------------------------------------------------------------------------------------        Pre visit planning completed.      Procedure details:    Patient scheduled for Colonoscopy  on 6/4/24.     Arrival time: 0945. Procedure time 1030    Facility location: [facility] [place]; [address], [place] [address]. Check in location: 2nd Floor at Surgery desk.    Sedation type: Conscious sedation     Pre op exam needed? N/A    Indication for procedure: Screen for colon cancer       Chart review:     Electronic implanted devices? No    Recent diagnosis of diverticulitis within the last 6 weeks? No    Diabetic? No      Medication review:    Anticoagulants? No    NSAIDS? No NSAID medications per patient's medication list.  RN will verify with pre-assessment call.    Other medication HOLDING recommendations:  N/A      Prep for procedure:     Bowel prep recommendation: Standard Miralax  Due to: standard bowel prep.    Prep instructions sent via [employer]         Corinne Kliber, RN  Endoscopy Procedure Pre Assessment RN  [phone] option 4

## 2024-06-04 ENCOUNTER — HOSPITAL ENCOUNTER (OUTPATIENT)
Facility: AMBULATORY SURGERY CENTER | Age: 49
Discharge: HOME | End: 2024-06-04
Attending: SURGERY | Admitting: SURGERY
Payer: COMMERCIAL

## 2024-06-04 VITALS
OXYGEN SATURATION: 97 % | SYSTOLIC BLOOD PRESSURE: 123 MMHG | TEMPERATURE: 97 F | RESPIRATION RATE: 16 BRPM | HEART RATE: 61 BPM | DIASTOLIC BLOOD PRESSURE: 80 MMHG

## 2024-06-04 LAB — COLONOSCOPY: NORMAL

## 2024-06-04 PROCEDURE — G8907 PT DOC NO EVENTS ON DISCHARG: HCPCS

## 2024-06-04 PROCEDURE — 45378 DIAGNOSTIC COLONOSCOPY: CPT

## 2024-06-04 PROCEDURE — G8918 PT W/O PREOP ORDER IV AB PRO: HCPCS

## 2024-06-04 RX ORDER — ONDANSETRON 4 MG/1
4 TABLET, ORALLY DISINTEGRATING ORAL EVERY 6 HOURS PRN
Status: DISCONTINUED | OUTPATIENT
Start: 2024-06-04 | End: 2024-06-05 | Stop reason: HOSPADM

## 2024-06-04 RX ORDER — FLUMAZENIL 0.1 MG/ML
0.2 INJECTION, SOLUTION INTRAVENOUS
Status: ACTIVE | OUTPATIENT
Start: 2024-06-04 | End: 2024-06-04

## 2024-06-04 RX ORDER — PROCHLORPERAZINE MALEATE 10 MG
10 TABLET ORAL EVERY 6 HOURS PRN
Status: DISCONTINUED | OUTPATIENT
Start: 2024-06-04 | End: 2024-06-05 | Stop reason: HOSPADM

## 2024-06-04 RX ORDER — NALOXONE HYDROCHLORIDE 0.4 MG/ML
0.2 INJECTION, SOLUTION INTRAMUSCULAR; INTRAVENOUS; SUBCUTANEOUS
Status: DISCONTINUED | OUTPATIENT
Start: 2024-06-04 | End: 2024-06-05 | Stop reason: HOSPADM

## 2024-06-04 RX ORDER — FENTANYL CITRATE 50 UG/ML
INJECTION, SOLUTION INTRAMUSCULAR; INTRAVENOUS PRN
Status: DISCONTINUED | OUTPATIENT
Start: 2024-06-04 | End: 2024-06-04 | Stop reason: HOSPADM

## 2024-06-04 RX ORDER — ONDANSETRON 2 MG/ML
4 INJECTION INTRAMUSCULAR; INTRAVENOUS
Status: DISCONTINUED | OUTPATIENT
Start: 2024-06-04 | End: 2024-06-05 | Stop reason: HOSPADM

## 2024-06-04 RX ORDER — NALOXONE HYDROCHLORIDE 0.4 MG/ML
0.4 INJECTION, SOLUTION INTRAMUSCULAR; INTRAVENOUS; SUBCUTANEOUS
Status: DISCONTINUED | OUTPATIENT
Start: 2024-06-04 | End: 2024-06-05 | Stop reason: HOSPADM

## 2024-06-04 RX ORDER — LIDOCAINE 40 MG/G
CREAM TOPICAL
Status: DISCONTINUED | OUTPATIENT
Start: 2024-06-04 | End: 2024-06-05 | Stop reason: HOSPADM

## 2024-06-04 RX ORDER — ONDANSETRON 2 MG/ML
4 INJECTION INTRAMUSCULAR; INTRAVENOUS EVERY 6 HOURS PRN
Status: DISCONTINUED | OUTPATIENT
Start: 2024-06-04 | End: 2024-06-05 | Stop reason: HOSPADM

## 2024-06-04 NOTE — H&P
Patient seen for Endoscopy    HPI:  Patient is a 48 year old male here for endoscopy. Not taking blood thinning medications. No MI or CVA history. No issues with previous sedation. No recent acute illness.    Review Of Systems    Skin: negative  Ears/Nose/Throat: negative  Respiratory: No shortness of breath, dyspnea on exertion, cough, or hemoptysis  Cardiovascular: negative  Gastrointestinal: negative  Genitourinary: negative  Musculoskeletal: negative  Neurologic: negative  Hematologic/Lymphatic/Immunologic: negative  Endocrine: negative      Past Medical History:   Diagnosis Date    High cholesterol     Myocardial infarction (H)        Past Surgical History:   Procedure Laterality Date    coronary stints[  2013    CV CORONARY ANGIOGRAM  2020    Procedure: CV CORONARY ANGIOGRAM;  Surgeon: Giovanni Sears MD;  [facility]    CV LEFT HEART CATH N/A 2020    Procedure: Left Heart Cath;  Surgeon: Giovanni Sears MD;  [facility]       Family History   Problem Relation Age of Onset    C.A.D. Father     Myocardial Infarction Father         45    Myocardial Infarction Paternal Uncle         45    Myocardial Infarction Paternal Uncle        Social History     Socioeconomic History    Marital status:      Spouse name: Not on file    Number of children: 2    Years of education: Not on file    Highest education level: Not on file   Occupational History    Occupation:    Tobacco Use    Smoking status: Former     Current packs/day: 0.00     Average packs/day: 1 pack/day for 15.0 years (15.0 ttl pk-yrs)     Types: Cigarettes     Start date: 1996     Quit date: 2011     Years since quittin.7     Passive exposure: Past    Smokeless tobacco: Never   Vaping Use    Vaping status: Never Used   Substance and Sexual Activity    Alcohol use: Yes     Comment: every couple days 1-2 beers/whiskey    Drug use: No     Sexual activity: Yes     Partners: Female   Other Topics Concern    Parent/sibling w/ CABG, MI or angioplasty before 65F 55M? Yes   Social History Narrative    Not on file     Social Determinants of Health     Financial Resource Strain: Low Risk  (3/28/2024)    Financial Resource Strain     Within the past 12 months, have you or your family members you live with been unable to get utilities (heat, electricity) when it was really needed?: No   Food Insecurity: Low Risk  (3/28/2024)    Food Insecurity     Within the past 12 months, did you worry that your food would run out before you got money to buy more?: No     Within the past 12 months, did the food you bought just not last and you didn t have money to get more?: No   Transportation Needs: Low Risk  (3/28/2024)    Transportation Needs     Within the past 12 months, has lack of transportation kept you from medical appointments, getting your medicines, non-medical meetings or appointments, work, or from getting things that you need?: No   Physical Activity: Sufficiently Active (3/28/2024)    Exercise Vital Sign     Days of Exercise per Week: 3 days     Minutes of Exercise per Session: 50 min   Stress: No Stress Concern Present (3/28/2024)    [employer] [place] [employer] - Occupational Stress Questionnaire     Feeling of Stress : Only a little   Social Connections: Unknown (3/28/2024)    Social Connection and Isolation Panel [NHANES]     Frequency of Communication with Friends and Family: Not on file     Frequency of Social Gatherings with Friends and Family: Once a week     Attends [employer] Services: Not on file     Active Member of Clubs or Organizations: Not on file     Attends Club or Organization Meetings: Not on file     Marital Status: Not on file   Interpersonal Safety: Low Risk  (3/28/2024)    Interpersonal Safety     Do you feel physically and emotionally safe where you currently live?: Yes     Within the past 12 months, have you been hit,  slapped, kicked or otherwise physically hurt by someone?: No     Within the past 12 months, have you been humiliated or emotionally abused in other ways by your partner or ex-partner?: No   Housing Stability: Low Risk  (3/28/2024)    Housing Stability     Do you have housing? : Yes     Are you worried about losing your housing?: No       Current Outpatient Medications   Medication Sig Dispense Refill    aspirin (ASA) 81 MG EC tablet Take 1 tablet (81 mg) by mouth daily 30 tablet 0    metoprolol succinate ER (TOPROL XL) 50 MG 24 hr tablet Take 1 tablet (50 mg) by mouth daily 90 tablet 3    rosuvastatin (CRESTOR) 40 MG tablet Take 1 tablet (40 mg) by mouth daily 90 tablet 3       Medications and history reviewed    Physical exam:  Vitals: /83   Pulse 65   Temp 97.3  F (36.3  C) (Temporal)   Resp 16   SpO2 96%   BMI= There is no height or weight on file to calculate BMI.    Constitutional: Healthy, alert, non-distressed   Head: Normo-cephalic, atraumatic, no lesions, masses or tenderness   Cardiovascular: RRR, no new murmurs, +S1, +S2 heart sounds, no clicks, rubs or gallops   Respiratory: CTAB, no rales, rhonchi or wheezing, equal chest rise, good respiratory effort   Gastrointestinal: Soft, non-tender, non distended, no rebound rigidity or guarding, no masses or hernias palpated   : Deferred  Musculoskeletal: Moves all extremities, normal  strength, no deformities noted   Skin: No suspicious lesions or rashes   Psychiatric: Mentation appears normal, affect appropriate   Hematologic/Lymphatic/Immunologic: Normal cervical and supraclavicular lymph nodes   Patient able to get up on table without difficulty.    Labs show:  No results found for this or any previous visit (from the past 24 hour(s)).    Assessment: Endoscopy  Plan: Pt cleared for anesthesia for proposed procedure.    Harris Tolentino,
